# Patient Record
Sex: MALE | Race: WHITE | NOT HISPANIC OR LATINO | Employment: OTHER | URBAN - METROPOLITAN AREA
[De-identification: names, ages, dates, MRNs, and addresses within clinical notes are randomized per-mention and may not be internally consistent; named-entity substitution may affect disease eponyms.]

---

## 2017-01-01 PROBLEM — R07.9 CHEST PAIN: Status: ACTIVE | Noted: 2017-01-01

## 2017-01-01 PROBLEM — I44.2 CHB (COMPLETE HEART BLOCK): Status: ACTIVE | Noted: 2017-01-01

## 2018-01-26 PROBLEM — Z95.0 PACEMAKER: Status: ACTIVE | Noted: 2018-01-26

## 2019-07-26 PROBLEM — M25.569 CHRONIC KNEE PAIN: Status: ACTIVE | Noted: 2019-07-26

## 2019-07-26 PROBLEM — G89.29 CHRONIC BACK PAIN: Status: ACTIVE | Noted: 2019-07-26

## 2019-07-26 PROBLEM — G89.29 CHRONIC KNEE PAIN: Status: ACTIVE | Noted: 2019-07-26

## 2019-07-26 PROBLEM — M54.9 CHRONIC BACK PAIN: Status: ACTIVE | Noted: 2019-07-26

## 2019-08-19 PROBLEM — F40.240 CLAUSTROPHOBIA: Status: ACTIVE | Noted: 2019-08-19

## 2020-02-03 PROBLEM — I70.0 AORTIC ATHEROSCLEROSIS: Status: ACTIVE | Noted: 2020-02-03

## 2020-05-07 ENCOUNTER — CLINICAL SUPPORT (OUTPATIENT)
Dept: URGENT CARE | Facility: CLINIC | Age: 75
End: 2020-05-07
Payer: MEDICARE

## 2020-05-07 DIAGNOSIS — Z01.818 PREOPERATIVE TESTING: ICD-10-CM

## 2020-05-07 PROCEDURE — U0002 COVID-19 LAB TEST NON-CDC: HCPCS

## 2020-05-08 LAB — SARS-COV-2 RNA RESP QL NAA+PROBE: NOT DETECTED

## 2020-05-12 PROBLEM — I48.0 PAF (PAROXYSMAL ATRIAL FIBRILLATION): Status: ACTIVE | Noted: 2020-05-12

## 2021-01-09 ENCOUNTER — IMMUNIZATION (OUTPATIENT)
Dept: FAMILY MEDICINE | Facility: CLINIC | Age: 76
End: 2021-01-09
Payer: MEDICARE

## 2021-01-09 DIAGNOSIS — Z23 NEED FOR VACCINATION: ICD-10-CM

## 2021-01-09 PROCEDURE — 91300 COVID-19, MRNA, LNP-S, PF, 30 MCG/0.3 ML DOSE VACCINE: CPT | Mod: PBBFAC | Performed by: INTERNAL MEDICINE

## 2021-01-30 ENCOUNTER — IMMUNIZATION (OUTPATIENT)
Dept: FAMILY MEDICINE | Facility: CLINIC | Age: 76
End: 2021-01-30
Payer: MEDICARE

## 2021-01-30 DIAGNOSIS — Z23 NEED FOR VACCINATION: Primary | ICD-10-CM

## 2021-01-30 PROCEDURE — 91300 COVID-19, MRNA, LNP-S, PF, 30 MCG/0.3 ML DOSE VACCINE: CPT | Mod: PBBFAC | Performed by: INTERNAL MEDICINE

## 2021-01-30 PROCEDURE — 0002A COVID-19, MRNA, LNP-S, PF, 30 MCG/0.3 ML DOSE VACCINE: CPT | Mod: PBBFAC | Performed by: INTERNAL MEDICINE

## 2021-02-09 PROBLEM — R73.03 PREDIABETES: Status: ACTIVE | Noted: 2021-02-09

## 2021-02-09 PROBLEM — Z79.01 CHRONIC ANTICOAGULATION: Status: ACTIVE | Noted: 2021-02-09

## 2021-02-09 PROBLEM — H91.90 HEARING LOSS: Status: ACTIVE | Noted: 2021-02-09

## 2021-10-19 ENCOUNTER — TELEPHONE (OUTPATIENT)
Dept: PAIN MEDICINE | Facility: CLINIC | Age: 76
End: 2021-10-19

## 2021-10-21 ENCOUNTER — TELEPHONE (OUTPATIENT)
Dept: PAIN MEDICINE | Facility: CLINIC | Age: 76
End: 2021-10-21

## 2021-11-15 ENCOUNTER — OFFICE VISIT (OUTPATIENT)
Dept: PAIN MEDICINE | Facility: CLINIC | Age: 76
End: 2021-11-15
Payer: MEDICARE

## 2021-11-15 VITALS
HEART RATE: 64 BPM | WEIGHT: 276.81 LBS | SYSTOLIC BLOOD PRESSURE: 163 MMHG | DIASTOLIC BLOOD PRESSURE: 84 MMHG | BODY MASS INDEX: 40.87 KG/M2

## 2021-11-15 DIAGNOSIS — M25.561 CHRONIC PAIN OF RIGHT KNEE: Primary | ICD-10-CM

## 2021-11-15 DIAGNOSIS — G89.29 CHRONIC PAIN OF RIGHT KNEE: Primary | ICD-10-CM

## 2021-11-15 DIAGNOSIS — M54.41 CHRONIC BILATERAL LOW BACK PAIN WITH RIGHT-SIDED SCIATICA: ICD-10-CM

## 2021-11-15 DIAGNOSIS — G89.29 CHRONIC BILATERAL LOW BACK PAIN WITH RIGHT-SIDED SCIATICA: ICD-10-CM

## 2021-11-15 PROCEDURE — 1125F PR PAIN SEVERITY QUANTIFIED, PAIN PRESENT: ICD-10-PCS | Mod: CPTII,S$GLB,, | Performed by: ANESTHESIOLOGY

## 2021-11-15 PROCEDURE — 1160F RVW MEDS BY RX/DR IN RCRD: CPT | Mod: CPTII,S$GLB,, | Performed by: ANESTHESIOLOGY

## 2021-11-15 PROCEDURE — 3077F PR MOST RECENT SYSTOLIC BLOOD PRESSURE >= 140 MM HG: ICD-10-PCS | Mod: CPTII,S$GLB,, | Performed by: ANESTHESIOLOGY

## 2021-11-15 PROCEDURE — 3079F PR MOST RECENT DIASTOLIC BLOOD PRESSURE 80-89 MM HG: ICD-10-PCS | Mod: CPTII,S$GLB,, | Performed by: ANESTHESIOLOGY

## 2021-11-15 PROCEDURE — 1125F AMNT PAIN NOTED PAIN PRSNT: CPT | Mod: CPTII,S$GLB,, | Performed by: ANESTHESIOLOGY

## 2021-11-15 PROCEDURE — 99204 OFFICE O/P NEW MOD 45 MIN: CPT | Mod: S$GLB,,, | Performed by: ANESTHESIOLOGY

## 2021-11-15 PROCEDURE — 4010F PR ACE/ARB THEARPY RXD/TAKEN: ICD-10-PCS | Mod: CPTII,S$GLB,, | Performed by: ANESTHESIOLOGY

## 2021-11-15 PROCEDURE — 3008F BODY MASS INDEX DOCD: CPT | Mod: CPTII,S$GLB,, | Performed by: ANESTHESIOLOGY

## 2021-11-15 PROCEDURE — 3079F DIAST BP 80-89 MM HG: CPT | Mod: CPTII,S$GLB,, | Performed by: ANESTHESIOLOGY

## 2021-11-15 PROCEDURE — 1160F PR REVIEW ALL MEDS BY PRESCRIBER/CLIN PHARMACIST DOCUMENTED: ICD-10-PCS | Mod: CPTII,S$GLB,, | Performed by: ANESTHESIOLOGY

## 2021-11-15 PROCEDURE — 4010F ACE/ARB THERAPY RXD/TAKEN: CPT | Mod: CPTII,S$GLB,, | Performed by: ANESTHESIOLOGY

## 2021-11-15 PROCEDURE — 3288F PR FALLS RISK ASSESSMENT DOCUMENTED: ICD-10-PCS | Mod: CPTII,S$GLB,, | Performed by: ANESTHESIOLOGY

## 2021-11-15 PROCEDURE — 3008F PR BODY MASS INDEX (BMI) DOCUMENTED: ICD-10-PCS | Mod: CPTII,S$GLB,, | Performed by: ANESTHESIOLOGY

## 2021-11-15 PROCEDURE — 1101F PT FALLS ASSESS-DOCD LE1/YR: CPT | Mod: CPTII,S$GLB,, | Performed by: ANESTHESIOLOGY

## 2021-11-15 PROCEDURE — 1159F PR MEDICATION LIST DOCUMENTED IN MEDICAL RECORD: ICD-10-PCS | Mod: CPTII,S$GLB,, | Performed by: ANESTHESIOLOGY

## 2021-11-15 PROCEDURE — 3288F FALL RISK ASSESSMENT DOCD: CPT | Mod: CPTII,S$GLB,, | Performed by: ANESTHESIOLOGY

## 2021-11-15 PROCEDURE — 99204 PR OFFICE/OUTPT VISIT, NEW, LEVL IV, 45-59 MIN: ICD-10-PCS | Mod: S$GLB,,, | Performed by: ANESTHESIOLOGY

## 2021-11-15 PROCEDURE — 99999 PR PBB SHADOW E&M-EST. PATIENT-LVL III: CPT | Mod: PBBFAC,,, | Performed by: ANESTHESIOLOGY

## 2021-11-15 PROCEDURE — 1101F PR PT FALLS ASSESS DOC 0-1 FALLS W/OUT INJ PAST YR: ICD-10-PCS | Mod: CPTII,S$GLB,, | Performed by: ANESTHESIOLOGY

## 2021-11-15 PROCEDURE — 3044F HG A1C LEVEL LT 7.0%: CPT | Mod: CPTII,S$GLB,, | Performed by: ANESTHESIOLOGY

## 2021-11-15 PROCEDURE — 99999 PR PBB SHADOW E&M-EST. PATIENT-LVL III: ICD-10-PCS | Mod: PBBFAC,,, | Performed by: ANESTHESIOLOGY

## 2021-11-15 PROCEDURE — 3077F SYST BP >= 140 MM HG: CPT | Mod: CPTII,S$GLB,, | Performed by: ANESTHESIOLOGY

## 2021-11-15 PROCEDURE — 1159F MED LIST DOCD IN RCRD: CPT | Mod: CPTII,S$GLB,, | Performed by: ANESTHESIOLOGY

## 2021-11-15 PROCEDURE — 3044F PR MOST RECENT HEMOGLOBIN A1C LEVEL <7.0%: ICD-10-PCS | Mod: CPTII,S$GLB,, | Performed by: ANESTHESIOLOGY

## 2021-11-18 ENCOUNTER — HOSPITAL ENCOUNTER (OUTPATIENT)
Dept: RADIOLOGY | Facility: HOSPITAL | Age: 76
Discharge: HOME | End: 2021-11-18
Attending: ANESTHESIOLOGY
Payer: MEDICARE

## 2021-11-18 DIAGNOSIS — G89.29 CHRONIC BILATERAL LOW BACK PAIN WITH RIGHT-SIDED SCIATICA: ICD-10-CM

## 2021-11-18 DIAGNOSIS — G89.29 CHRONIC PAIN OF RIGHT KNEE: ICD-10-CM

## 2021-11-18 DIAGNOSIS — M54.41 CHRONIC BILATERAL LOW BACK PAIN WITH RIGHT-SIDED SCIATICA: ICD-10-CM

## 2021-11-18 DIAGNOSIS — M25.561 CHRONIC PAIN OF RIGHT KNEE: ICD-10-CM

## 2021-11-18 PROCEDURE — 72110 X-RAY EXAM L-2 SPINE 4/>VWS: CPT | Mod: 26,,, | Performed by: RADIOLOGY

## 2021-11-18 PROCEDURE — 73562 XR KNEE ORTHO RIGHT: ICD-10-PCS | Mod: 26,RT,, | Performed by: RADIOLOGY

## 2021-11-18 PROCEDURE — 73560 X-RAY EXAM OF KNEE 1 OR 2: CPT | Mod: TC,FY,PO,LT

## 2021-11-18 PROCEDURE — 72110 XR LUMBAR SPINE 5 VIEW WITH FLEX AND EXT: ICD-10-PCS | Mod: 26,,, | Performed by: RADIOLOGY

## 2021-11-18 PROCEDURE — 72110 X-RAY EXAM L-2 SPINE 4/>VWS: CPT | Mod: TC,FY,PO

## 2021-11-18 PROCEDURE — 73562 X-RAY EXAM OF KNEE 3: CPT | Mod: 26,RT,, | Performed by: RADIOLOGY

## 2021-11-18 PROCEDURE — 73560 X-RAY EXAM OF KNEE 1 OR 2: CPT | Mod: 26,LT,, | Performed by: RADIOLOGY

## 2021-11-18 PROCEDURE — 73560 XR KNEE ORTHO RIGHT: ICD-10-PCS | Mod: 26,LT,, | Performed by: RADIOLOGY

## 2021-11-22 ENCOUNTER — TELEPHONE (OUTPATIENT)
Dept: PAIN MEDICINE | Facility: CLINIC | Age: 76
End: 2021-11-22
Payer: MEDICARE

## 2021-11-22 RX ORDER — HYDROCODONE BITARTRATE AND ACETAMINOPHEN 10; 325 MG/1; MG/1
1 TABLET ORAL EVERY 12 HOURS PRN
Qty: 60 TABLET | Refills: 0 | Status: SHIPPED | OUTPATIENT
Start: 2021-11-22 | End: 2021-12-21 | Stop reason: SDUPTHER

## 2021-11-24 ENCOUNTER — OFFICE VISIT (OUTPATIENT)
Dept: PHYSICAL MEDICINE AND REHAB | Facility: CLINIC | Age: 76
End: 2021-11-24
Payer: MEDICARE

## 2021-11-24 VITALS — HEIGHT: 69 IN | WEIGHT: 276.69 LBS | BODY MASS INDEX: 40.98 KG/M2

## 2021-11-24 DIAGNOSIS — M25.561 CHRONIC PAIN OF RIGHT KNEE: ICD-10-CM

## 2021-11-24 DIAGNOSIS — M17.11 PRIMARY OSTEOARTHRITIS OF RIGHT KNEE: Primary | ICD-10-CM

## 2021-11-24 DIAGNOSIS — G89.29 CHRONIC PAIN OF RIGHT KNEE: ICD-10-CM

## 2021-11-24 PROCEDURE — 99204 OFFICE O/P NEW MOD 45 MIN: CPT | Mod: 25,S$GLB,, | Performed by: PHYSICAL MEDICINE & REHABILITATION

## 2021-11-24 PROCEDURE — 99499 RISK ADDL DX/OHS AUDIT: ICD-10-PCS | Mod: S$GLB,,, | Performed by: PHYSICAL MEDICINE & REHABILITATION

## 2021-11-24 PROCEDURE — 20611 DRAIN/INJ JOINT/BURSA W/US: CPT | Mod: RT,S$GLB,, | Performed by: PHYSICAL MEDICINE & REHABILITATION

## 2021-11-24 PROCEDURE — 99999 PR PBB SHADOW E&M-EST. PATIENT-LVL III: ICD-10-PCS | Mod: PBBFAC,,, | Performed by: PHYSICAL MEDICINE & REHABILITATION

## 2021-11-24 PROCEDURE — 4010F ACE/ARB THERAPY RXD/TAKEN: CPT | Mod: CPTII,S$GLB,, | Performed by: PHYSICAL MEDICINE & REHABILITATION

## 2021-11-24 PROCEDURE — 4010F PR ACE/ARB THEARPY RXD/TAKEN: ICD-10-PCS | Mod: CPTII,S$GLB,, | Performed by: PHYSICAL MEDICINE & REHABILITATION

## 2021-11-24 PROCEDURE — 99204 PR OFFICE/OUTPT VISIT, NEW, LEVL IV, 45-59 MIN: ICD-10-PCS | Mod: 25,S$GLB,, | Performed by: PHYSICAL MEDICINE & REHABILITATION

## 2021-11-24 PROCEDURE — 99999 PR PBB SHADOW E&M-EST. PATIENT-LVL III: CPT | Mod: PBBFAC,,, | Performed by: PHYSICAL MEDICINE & REHABILITATION

## 2021-11-24 PROCEDURE — 99499 UNLISTED E&M SERVICE: CPT | Mod: S$GLB,,, | Performed by: PHYSICAL MEDICINE & REHABILITATION

## 2021-11-24 PROCEDURE — 20611 LARGE JOINT ASPIRATION/INJECTION: R KNEE: ICD-10-PCS | Mod: RT,S$GLB,, | Performed by: PHYSICAL MEDICINE & REHABILITATION

## 2021-11-24 RX ORDER — TRIAMCINOLONE ACETONIDE 40 MG/ML
40 INJECTION, SUSPENSION INTRA-ARTICULAR; INTRAMUSCULAR
Status: DISCONTINUED | OUTPATIENT
Start: 2021-11-24 | End: 2021-11-25 | Stop reason: HOSPADM

## 2021-11-24 RX ADMIN — TRIAMCINOLONE ACETONIDE 40 MG: 40 INJECTION, SUSPENSION INTRA-ARTICULAR; INTRAMUSCULAR at 02:11

## 2021-11-29 ENCOUNTER — OFFICE VISIT (OUTPATIENT)
Dept: OPTOMETRY | Facility: CLINIC | Age: 76
End: 2021-11-29
Payer: MEDICARE

## 2021-11-29 DIAGNOSIS — H25.13 NUCLEAR SCLEROSIS, BILATERAL: Primary | ICD-10-CM

## 2021-11-29 DIAGNOSIS — H43.811 POSTERIOR VITREOUS DETACHMENT, RIGHT: ICD-10-CM

## 2021-11-29 DIAGNOSIS — H02.831 DERMATOCHALASIS OF BOTH UPPER EYELIDS: ICD-10-CM

## 2021-11-29 DIAGNOSIS — H02.834 DERMATOCHALASIS OF BOTH UPPER EYELIDS: ICD-10-CM

## 2021-11-29 DIAGNOSIS — H52.203 HYPEROPIA WITH ASTIGMATISM AND PRESBYOPIA, BILATERAL: ICD-10-CM

## 2021-11-29 DIAGNOSIS — H52.03 HYPEROPIA WITH ASTIGMATISM AND PRESBYOPIA, BILATERAL: ICD-10-CM

## 2021-11-29 DIAGNOSIS — H52.4 HYPEROPIA WITH ASTIGMATISM AND PRESBYOPIA, BILATERAL: ICD-10-CM

## 2021-11-29 DIAGNOSIS — Z13.5 GLAUCOMA SCREENING: ICD-10-CM

## 2021-11-29 PROCEDURE — 99999 PR PBB SHADOW E&M-EST. PATIENT-LVL III: ICD-10-PCS | Mod: PBBFAC,,, | Performed by: OPTOMETRIST

## 2021-11-29 PROCEDURE — 92004 PR EYE EXAM, NEW PATIENT,COMPREHESV: ICD-10-PCS | Mod: S$GLB,,, | Performed by: OPTOMETRIST

## 2021-11-29 PROCEDURE — 99999 PR PBB SHADOW E&M-EST. PATIENT-LVL III: CPT | Mod: PBBFAC,,, | Performed by: OPTOMETRIST

## 2021-11-29 PROCEDURE — 4010F PR ACE/ARB THEARPY RXD/TAKEN: ICD-10-PCS | Mod: CPTII,S$GLB,, | Performed by: OPTOMETRIST

## 2021-11-29 PROCEDURE — 4010F ACE/ARB THERAPY RXD/TAKEN: CPT | Mod: CPTII,S$GLB,, | Performed by: OPTOMETRIST

## 2021-11-29 PROCEDURE — 92004 COMPRE OPH EXAM NEW PT 1/>: CPT | Mod: S$GLB,,, | Performed by: OPTOMETRIST

## 2021-11-29 PROCEDURE — 92015 PR REFRACTION: ICD-10-PCS | Mod: S$GLB,,, | Performed by: OPTOMETRIST

## 2021-11-29 PROCEDURE — 92015 DETERMINE REFRACTIVE STATE: CPT | Mod: S$GLB,,, | Performed by: OPTOMETRIST

## 2021-12-13 ENCOUNTER — TELEPHONE (OUTPATIENT)
Dept: PAIN MEDICINE | Facility: CLINIC | Age: 76
End: 2021-12-13
Payer: MEDICARE

## 2021-12-21 ENCOUNTER — OFFICE VISIT (OUTPATIENT)
Dept: PAIN MEDICINE | Facility: CLINIC | Age: 76
End: 2021-12-21
Payer: MEDICARE

## 2021-12-21 VITALS
BODY MASS INDEX: 40.74 KG/M2 | SYSTOLIC BLOOD PRESSURE: 137 MMHG | HEART RATE: 60 BPM | WEIGHT: 275.88 LBS | RESPIRATION RATE: 20 BRPM | OXYGEN SATURATION: 95 % | TEMPERATURE: 98 F | DIASTOLIC BLOOD PRESSURE: 65 MMHG

## 2021-12-21 DIAGNOSIS — M47.816 LUMBAR SPONDYLOSIS: ICD-10-CM

## 2021-12-21 DIAGNOSIS — G89.29 CHRONIC BILATERAL LOW BACK PAIN WITHOUT SCIATICA: Primary | ICD-10-CM

## 2021-12-21 DIAGNOSIS — Z02.89 PAIN MANAGEMENT CONTRACT SIGNED: ICD-10-CM

## 2021-12-21 DIAGNOSIS — M54.50 CHRONIC BILATERAL LOW BACK PAIN WITHOUT SCIATICA: Primary | ICD-10-CM

## 2021-12-21 PROCEDURE — 4010F ACE/ARB THERAPY RXD/TAKEN: CPT | Mod: CPTII,S$GLB,, | Performed by: ANESTHESIOLOGY

## 2021-12-21 PROCEDURE — 99214 PR OFFICE/OUTPT VISIT, EST, LEVL IV, 30-39 MIN: ICD-10-PCS | Mod: S$GLB,,, | Performed by: ANESTHESIOLOGY

## 2021-12-21 PROCEDURE — 99214 OFFICE O/P EST MOD 30 MIN: CPT | Mod: S$GLB,,, | Performed by: ANESTHESIOLOGY

## 2021-12-21 PROCEDURE — 99999 PR PBB SHADOW E&M-EST. PATIENT-LVL IV: CPT | Mod: PBBFAC,,, | Performed by: ANESTHESIOLOGY

## 2021-12-21 PROCEDURE — 80307 DRUG TEST PRSMV CHEM ANLYZR: CPT | Performed by: ANESTHESIOLOGY

## 2021-12-21 PROCEDURE — 99999 PR PBB SHADOW E&M-EST. PATIENT-LVL IV: ICD-10-PCS | Mod: PBBFAC,,, | Performed by: ANESTHESIOLOGY

## 2021-12-21 PROCEDURE — 4010F PR ACE/ARB THEARPY RXD/TAKEN: ICD-10-PCS | Mod: CPTII,S$GLB,, | Performed by: ANESTHESIOLOGY

## 2021-12-21 RX ORDER — HYDROCODONE BITARTRATE AND ACETAMINOPHEN 10; 325 MG/1; MG/1
1 TABLET ORAL EVERY 12 HOURS PRN
Qty: 60 TABLET | Refills: 0 | Status: SHIPPED | OUTPATIENT
Start: 2021-12-21 | End: 2022-01-21 | Stop reason: SDUPTHER

## 2021-12-27 LAB
6MAM UR QL: NOT DETECTED
7AMINOCLONAZEPAM UR QL: NOT DETECTED
A-OH ALPRAZ UR QL: NOT DETECTED
ALPHA-OH-MIDAZOLAM: NOT DETECTED
ALPRAZ UR QL: NOT DETECTED
AMPHET UR QL SCN: NOT DETECTED
ANNOTATION COMMENT IMP: NORMAL
ANNOTATION COMMENT IMP: NORMAL
BARBITURATES UR QL: NOT DETECTED
BUPRENORPHINE UR QL: NOT DETECTED
BZE UR QL: NOT DETECTED
CARBOXYTHC UR QL: NOT DETECTED
CARISOPRODOL UR QL: NOT DETECTED
CLONAZEPAM UR QL: NOT DETECTED
CODEINE UR QL: NOT DETECTED
CREAT UR-MCNC: 162.8 MG/DL (ref 20–400)
DIAZEPAM UR QL: NOT DETECTED
ETHYL GLUCURONIDE UR QL: PRESENT
FENTANYL UR QL: NOT DETECTED
GABAPENTIN: NOT DETECTED
HYDROCODONE UR QL: NOT DETECTED
HYDROMORPHONE UR QL: NOT DETECTED
LORAZEPAM UR QL: NOT DETECTED
MDA UR QL: NOT DETECTED
MDEA UR QL: NOT DETECTED
MDMA UR QL: NOT DETECTED
ME-PHENIDATE UR QL: NOT DETECTED
METHADONE UR QL: NOT DETECTED
METHAMPHET UR QL: NOT DETECTED
MIDAZOLAM UR QL SCN: NOT DETECTED
MORPHINE UR QL: NOT DETECTED
NALOXONE: NOT DETECTED
NORBUPRENORPHINE UR QL CFM: NOT DETECTED
NORDIAZEPAM UR QL: NOT DETECTED
NORFENTANYL UR QL: NOT DETECTED
NORHYDROCODONE UR QL CFM: NOT DETECTED
NORMEPERIDINE UR QL CFM: NOT DETECTED
NOROXYCODONE UR QL CFM: PRESENT
NOROXYMORPHONE UR QL SCN: NOT DETECTED
OXAZEPAM UR QL: NOT DETECTED
OXYCODONE UR QL: PRESENT
OXYMORPHONE UR QL: PRESENT
PATHOLOGY STUDY: NORMAL
PCP UR QL: NOT DETECTED
PHENTERMINE UR QL: NOT DETECTED
PREGABALIN: NOT DETECTED
SERVICE CMNT-IMP: NORMAL
TAPENTADOL UR QL SCN: NOT DETECTED
TAPENTADOL UR QL SCN: NOT DETECTED
TEMAZEPAM UR QL: NOT DETECTED
TRAMADOL UR QL: NOT DETECTED
ZOLPIDEM METABOLITE: NOT DETECTED
ZOLPIDEM UR QL: NOT DETECTED

## 2022-01-21 RX ORDER — HYDROCODONE BITARTRATE AND ACETAMINOPHEN 10; 325 MG/1; MG/1
1 TABLET ORAL EVERY 12 HOURS PRN
Qty: 60 TABLET | Refills: 0 | Status: SHIPPED | OUTPATIENT
Start: 2022-01-21 | End: 2022-02-21 | Stop reason: SDUPTHER

## 2022-01-21 NOTE — TELEPHONE ENCOUNTER
----- Message from Phuong Gee sent at 1/21/2022  9:14 AM CST -----  Type:  RX Refill Request  Who Called: Patient  Refill or New Rx: refill  RX Name and Strength: HYDROcodone-acetaminophen (NORCO)  mg per tablet 60 tablet   How is the patient currently taking it? (ex. 1XDay):    Is this a 30 day or 90 day RX:    Preferred Pharmacy with phone number:  [id number] - [place] [facility], [address] [address] [facility]   Phone:  [phone]  Fax:  [phone]  Local or Mail Order:  Local  Ordering Provider:  Micheal Marquis MD  Best Call Back Number: [phone]   Additional Information: Pt requesting refill on Rx HYDROcodone-acetaminophen (NORCO)  mg per tablet 60 tablet

## 2022-03-21 ENCOUNTER — OFFICE VISIT (OUTPATIENT)
Dept: PAIN MEDICINE | Facility: CLINIC | Age: 77
End: 2022-03-21
Payer: MEDICARE

## 2022-03-21 VITALS
WEIGHT: 264.69 LBS | HEIGHT: 69 IN | HEART RATE: 61 BPM | SYSTOLIC BLOOD PRESSURE: 149 MMHG | OXYGEN SATURATION: 95 % | BODY MASS INDEX: 39.2 KG/M2 | DIASTOLIC BLOOD PRESSURE: 69 MMHG

## 2022-03-21 DIAGNOSIS — M54.50 CHRONIC BILATERAL LOW BACK PAIN WITHOUT SCIATICA: ICD-10-CM

## 2022-03-21 DIAGNOSIS — G89.29 CHRONIC BILATERAL LOW BACK PAIN WITHOUT SCIATICA: ICD-10-CM

## 2022-03-21 DIAGNOSIS — G89.29 CHRONIC BILATERAL LOW BACK PAIN WITHOUT SCIATICA: Primary | ICD-10-CM

## 2022-03-21 DIAGNOSIS — M25.561 CHRONIC PAIN OF RIGHT KNEE: ICD-10-CM

## 2022-03-21 DIAGNOSIS — Z02.89 PAIN MANAGEMENT CONTRACT SIGNED: Primary | ICD-10-CM

## 2022-03-21 DIAGNOSIS — G89.29 CHRONIC PAIN OF RIGHT KNEE: ICD-10-CM

## 2022-03-21 DIAGNOSIS — Z02.89 PAIN MANAGEMENT CONTRACT SIGNED: ICD-10-CM

## 2022-03-21 DIAGNOSIS — M54.50 CHRONIC BILATERAL LOW BACK PAIN WITHOUT SCIATICA: Primary | ICD-10-CM

## 2022-03-21 PROCEDURE — 99214 OFFICE O/P EST MOD 30 MIN: CPT | Mod: S$GLB,,,

## 2022-03-21 PROCEDURE — 3077F PR MOST RECENT SYSTOLIC BLOOD PRESSURE >= 140 MM HG: ICD-10-PCS | Mod: CPTII,S$GLB,,

## 2022-03-21 PROCEDURE — 3288F FALL RISK ASSESSMENT DOCD: CPT | Mod: CPTII,S$GLB,,

## 2022-03-21 PROCEDURE — 99999 PR PBB SHADOW E&M-EST. PATIENT-LVL III: ICD-10-PCS | Mod: PBBFAC,,,

## 2022-03-21 PROCEDURE — 3077F SYST BP >= 140 MM HG: CPT | Mod: CPTII,S$GLB,,

## 2022-03-21 PROCEDURE — 1126F AMNT PAIN NOTED NONE PRSNT: CPT | Mod: CPTII,S$GLB,,

## 2022-03-21 PROCEDURE — 1101F PR PT FALLS ASSESS DOC 0-1 FALLS W/OUT INJ PAST YR: ICD-10-PCS | Mod: CPTII,S$GLB,,

## 2022-03-21 PROCEDURE — 3288F PR FALLS RISK ASSESSMENT DOCUMENTED: ICD-10-PCS | Mod: CPTII,S$GLB,,

## 2022-03-21 PROCEDURE — 1126F PR PAIN SEVERITY QUANTIFIED, NO PAIN PRESENT: ICD-10-PCS | Mod: CPTII,S$GLB,,

## 2022-03-21 PROCEDURE — 1159F PR MEDICATION LIST DOCUMENTED IN MEDICAL RECORD: ICD-10-PCS | Mod: CPTII,S$GLB,,

## 2022-03-21 PROCEDURE — 1159F MED LIST DOCD IN RCRD: CPT | Mod: CPTII,S$GLB,,

## 2022-03-21 PROCEDURE — 99214 PR OFFICE/OUTPT VISIT, EST, LEVL IV, 30-39 MIN: ICD-10-PCS | Mod: S$GLB,,,

## 2022-03-21 PROCEDURE — 1101F PT FALLS ASSESS-DOCD LE1/YR: CPT | Mod: CPTII,S$GLB,,

## 2022-03-21 PROCEDURE — 99999 PR PBB SHADOW E&M-EST. PATIENT-LVL III: CPT | Mod: PBBFAC,,,

## 2022-03-21 PROCEDURE — 3078F PR MOST RECENT DIASTOLIC BLOOD PRESSURE < 80 MM HG: ICD-10-PCS | Mod: CPTII,S$GLB,,

## 2022-03-21 PROCEDURE — 3078F DIAST BP <80 MM HG: CPT | Mod: CPTII,S$GLB,,

## 2022-03-21 RX ORDER — HYDROCODONE BITARTRATE AND ACETAMINOPHEN 10; 325 MG/1; MG/1
1 TABLET ORAL EVERY 12 HOURS PRN
Qty: 60 TABLET | Refills: 0 | Status: SHIPPED | OUTPATIENT
Start: 2022-03-23 | End: 2022-04-21 | Stop reason: SDUPTHER

## 2022-03-21 NOTE — PROGRESS NOTES
[facility] Follow Up Evaluation    Referred by: Dr. Lerner  Reason for referral: back and neck pain    CC:   Chief Complaint   Patient presents with    Follow-up      Last 3 PDI Scores 12/21/2021 11/15/2021   Pain Disability Index (PDI) 6 48       Interval HPI 3/21/2022: Jay Han returns to the clinic for follow up.  Today he continues report low back pain and right knee pain however it is at his baseline.  Occasionally he states he over does it and as worsening back pain but overall it is tolerable.  He continues to take hydrocodone 2 tablets p.r.n. per day without any adverse events or side effects.  Pain today is a 2/10, he denies any new numbness, weakness or new changes to his bowel bladder function.      Initial HPI:   Jay Han is a 77 y.o. male who complains of back and right knee pain.  He's had this pain chronically for many years.  Has been taking hydrocodone as prescribed by his PCP.  Takes care of his disabled daughter at home.  Today his pain is 9/10, constant, sharp, aching, burning.  No numbness or weakness.  Pain can go down the right leg at times.    History:    Current Outpatient Medications:     amiodarone (PACERONE) 200 MG Tab, TAKE 1 TABLET EVERY DAY, Disp: 90 tablet, Rfl: 1    amLODIPine (NORVASC) 10 MG tablet, TAKE 1 TABLET EVERY DAY, Disp: 90 tablet, Rfl: 3    apixaban (ELIQUIS) 5 mg Tab, Take 1 tablet (5 mg total) by mouth 2 (two) times daily., Disp: 180 tablet, Rfl: 1    aspirin (ECOTRIN) 81 MG EC tablet, TAKE 1 TABLET EVERY DAY, Disp: 90 tablet, Rfl: 1    enalapril (VASOTEC) 20 MG tablet, TAKE 2 TABLETS EVERY DAY, Disp: 180 tablet, Rfl: 3    hydroCHLOROthiazide (HYDRODIURIL) 25 MG tablet, Take 1 tablet (25 mg total) by mouth once daily., Disp: 90 tablet, Rfl: 1    HYDROcodone-acetaminophen (NORCO)  mg per tablet, Take 1 tablet by mouth every 12 (twelve) hours as needed for Pain., Disp: 60 tablet, Rfl: 0    metoprolol tartrate (LOPRESSOR) 25 MG tablet,  "TAKE 1/2 TABLET TWICE DAILY, Disp: 90 tablet, Rfl: 1    potassium chloride SA (K-DUR,KLOR-CON) 20 MEQ tablet, TAKE 1 TABLET THREE TIMES WEEKLY (Patient taking differently: Take 10 mEq by mouth every other day.), Disp: 39 tablet, Rfl: 11    pravastatin (PRAVACHOL) 40 MG tablet, Take 1 tablet (40 mg total) by mouth once daily., Disp: 90 tablet, Rfl: 1    Past Medical History:   Diagnosis Date    Arthritis     Coronary artery disease     Dyslipidemia 1/14/2016    ED (erectile dysfunction)     Essential hypertension 1/14/2016    Essential hypertension, benign     H/O asbestos exposure     Hearing aid worn     History of chicken pox     History of wheezing     Hx of sarcoidosis     Hyperglycemia     Knee pain, chronic     right    Lipoprotein deficiencies     Lumbago     Lumbar herniated disc     Nonsustained ventricular tachycardia 1/14/2016    Other and unspecified hyperlipidemia     Other malaise and fatigue     Presence of stent in LAD coronary artery 2/11/2016    Presence of stent in left circumflex coronary artery 3/10/2016    Pure hyperglyceridemia     S/P coronary artery stent placement 3/10/2016    LAD, LCX    Shoulder pain     left shoulder    Ventricular premature complexes 1/14/2016       Past Surgical History:   Procedure Laterality Date    CARDIAC PACEMAKER PLACEMENT      CHOLECYSTECTOMY      COLONOSCOPY  2014    "a few polyps" per pt , approx date     CORONARY STENT PLACEMENT  1/2016    INSERTION OF PACEMAKER  2018    LUNG BIOPSY      benign       Family History   Problem Relation Age of Onset    Hypertension Mother     Hypertension Maternal Grandmother     Hypertension Maternal Grandfather     Kidney disease Father     Heart disease Maternal Uncle         heart attack, smoker    Glaucoma Neg Hx        Social History     Socioeconomic History    Marital status: Single   Tobacco Use    Smoking status: Never Smoker    Smokeless tobacco: Never Used   Substance and " "Sexual Activity    Alcohol use: Yes     Alcohol/week: 1.0 standard drink     Types: 1 Cans of beer per week     Comment: "very moderate"     Drug use: No    Sexual activity: Never     Social Determinants of Health     Financial Resource Strain: Medium Risk    Difficulty of Paying Living Expenses: Somewhat hard   Food Insecurity: No Food Insecurity    Worried About Running Out of Food in the Last Year: Never true    Ran Out of Food in the Last Year: Never true   Transportation Needs: No Transportation Needs    Lack of Transportation (Medical): No    Lack of Transportation (Non-Medical): No   Physical Activity: Inactive    Days of Exercise per Week: 0 days    Minutes of Exercise per Session: 0 min   Stress: Stress Concern Present    Feeling of Stress : Rather much   Social Connections: Unknown    Frequency of Communication with Friends and Family: More than three times a week    Frequency of Social Gatherings with Friends and Family: More than three times a week    Active Member of Clubs or Organizations: No    Attends Club or Organization Meetings: Never    Marital Status:    Housing Stability: Low Risk     Unable to Pay for Housing in the Last Year: No    Number of Places Lived in the Last Year: 1    Unstable Housing in the Last Year: No       Review of patient's allergies indicates:  No Known Allergies    Review of Systems:  General ROS: negative for - fever  Psychological ROS: negative for - hostility  Hematological and Lymphatic ROS: negative for - bleeding problems  Endocrine ROS: negative for - unexpected weight changes  Respiratory ROS: no cough, shortness of breath, or wheezing  Cardiovascular ROS: no chest pain or dyspnea on exertion  Gastrointestinal ROS: no abdominal pain, change in bowel habits, or black or bloody stools  Musculoskeletal ROS: negative for - muscular weakness  Neurological ROS: negative for - numbness/tingling  Dermatological ROS: negative for rash    Physical " "Exam:  Vitals:    03/21/22 0858   BP: (!) 149/69   Pulse: 61   SpO2: 95%   Weight: 120.1 kg (264 lb 10.6 oz)   Height: 5' 9" (1.753 m)   PainSc: 0-No pain     Body mass index is 39.08 kg/m².     Gen: NAD  Gait: gait intact  Psych:  Mood appropriate for given condition  HEENT: eyes anicteric   GI: Abd soft  CV: RRR  Lungs: breathing unlabored   ROM: limited AROM of the L spine in all planes, full ROM at ankles, knees and hips  Lumbar flexion 90 degrees, extension 50 degrees, side bending 30 degrees.    Sensation: intact to light touch in all dermatomes tested from L2-S1 bilaterally  Reflexes: 0/0 b/l patella and achilles  Palpation: Diffusely tender over lumbar paraspinals  -TTP over the b/l greater trochanters and bilateral SI joint  Tone: normal in the b/l knees and hips   Skin: intact  Extremities: No edema in b/l ankles or hands  Provacative tests: + b/l axial facet loading       Right Left   L2/3 Iliacus Hip flexion  5  5   L3/4 Qudratus Femoris Knee Extension  5  5   L4/5 Tib Anterior Ankle Dorsiflexion   5  5   L5/S1 Extensor Hallicus Longus Great toe extension  5  5                 S1/S2 Gastroc/Soleus Plantar Flexion  5  5       Imaging:  Xray lumbar spine 11/18/21  FINDINGS:  There is 4 mm anterolisthesis L2 on L3 which persists with flexion and extension position.  There is levoscoliosis centered at L3.  No pars defects.  No displaced fracture with preserved vertebral body heights.  Bulky partially bridging osteophytes at several lower thoracic levels and several lower lumbar levels most notably about L5.  Degenerative disc disease mild at L2-3 and moderate at L3-4 through L5-S1.  Facet degenerative change lowest 4 lumbar levels.  Cholecystectomy clips.  Atherosclerosis.  Partially imaged transvenous pacing wire.    Xray right knee 11/18/21  FINDINGS:  Marked medial joint space narrowing is noted bilaterally.  There is moderate tricompartmental osteophyte formation.  There is a joint effusion on the right. "  I do not see evidence of acute fracture.    Labs:  BMP  Lab Results   Component Value Date     02/17/2021    K 4.1 02/17/2021     02/17/2021    CO2 29 02/17/2021    BUN 27 (H) 02/17/2021    CREATININE 0.91 02/17/2021    CALCIUM 9.6 02/17/2021    ANIONGAP 8 05/09/2019    ESTGFRAFRICA 97 02/17/2021    EGFRNONAA 83 02/17/2021     Lab Results   Component Value Date    ALT 13 02/06/2020    AST 14 02/06/2020    ALKPHOS 54 02/06/2020    BILITOT 0.7 02/06/2020       Assessment:   Problem List Items Addressed This Visit        Orthopedic    Chronic knee pain    Chronic back pain - Primary      Other Visit Diagnoses     Pain management contract signed              77 y.o. year old male with PMH CAD s/p stents, a-fib, MORRIS, HTN who complains of back and right knee pain.  He's had this pain chronically for many years.  Has been taking hydrocodone as prescribed by his PCP.  Takes care of his disabled daughter at home.  Today his pain is 9/10, constant, sharp, aching, burning.  No numbness or weakness.  Pain can go down the right leg at times.    3/21/2022: Jay Han returns to the clinic for follow up.  Today he continues report low back pain and right knee pain however it is at his baseline.  Occasionally he states he over does it and as worsening back pain but overall it is tolerable.  He continues to take hydrocodone 2 tablets p.r.n. per day without any adverse events or side effects.  Pain today is a 2/10, he denies any new numbness, weakness or new changes to his bowel bladder function.    - on exam he has full strength  + b/l axial facet loading  - he reports he had good relief from previous injection from PMR, but feels like it is wearing off.  Today we discussed a genicular block for his right knee, he is not ready at this time and would like to think about it.  We also discussed trying diagnostic medial branch blocks for his low back pain however at this time he states his pain is tolerable  - xray  lumbar spine c/w facet degenerative change lowest 4 lumbar levels  - continue hydrocodone 10/325mg po bid prn for severe pain.  No signs of abuse, no adverse events noted, patient experiences significant benefit of analgesia, and patient demonstrates increased activity while on these medications.  The patient is meeting the goals of opioid therapy and is dependent on them for functionality and can not perform ADLS without them. Regarding opioids, the risks were discussed including tolerance, addiction, overdose, over-sedation, drug interactions, respiratory depression, opioid-induced hyperalgesia, and even death.    - refill for hydrocodone  mg #60 tabs sent to Dr. Marquis today  - follow-up in 3 months or sooner if needed.      : Reviewed and consistent with medication use as prescribed.    The above note was completed, in part, with the aid of Dragon dictation software/hardware. Translation errors may be present.

## 2022-04-21 ENCOUNTER — PATIENT MESSAGE (OUTPATIENT)
Dept: PAIN MEDICINE | Facility: CLINIC | Age: 77
End: 2022-04-21
Payer: MEDICARE

## 2022-04-21 DIAGNOSIS — G89.29 CHRONIC BILATERAL LOW BACK PAIN WITHOUT SCIATICA: ICD-10-CM

## 2022-04-21 DIAGNOSIS — Z02.89 PAIN MANAGEMENT CONTRACT SIGNED: ICD-10-CM

## 2022-04-21 DIAGNOSIS — M54.50 CHRONIC BILATERAL LOW BACK PAIN WITHOUT SCIATICA: ICD-10-CM

## 2022-04-21 RX ORDER — HYDROCODONE BITARTRATE AND ACETAMINOPHEN 10; 325 MG/1; MG/1
1 TABLET ORAL EVERY 12 HOURS PRN
Qty: 60 TABLET | Refills: 0 | Status: SHIPPED | OUTPATIENT
Start: 2022-04-21 | End: 2022-05-20 | Stop reason: SDUPTHER

## 2022-04-21 NOTE — TELEPHONE ENCOUNTER
----- Message from Candida Cazares sent at 4/21/2022  2:31 PM CDT -----  Regarding: refill  Contact: Patient/[phone] (home)  Type:  RX Refill Request    Who Called:  Patient/[phone] (home)     Refill or New Rx:  refill  RX Name and Strength:  HYDROcodone-acetaminophen (NORCO)  mg per tablet      How is the patient currently taking it? (ex. 1XDay):  as needed  Is this a 30 day or 90 day RX:  60 pills  Preferred Pharmacy with phone number:      [id number] - [facility], [address] [facility], [address] [facility] [address]  Phone: [phone] Fax: [phone]       Local or Mail Order:  local  Ordering Provider:  same

## 2022-04-21 NOTE — TELEPHONE ENCOUNTER
HYDROcodone-acetaminophen (NORCO)  mg per tablet   Take 1 tablet by mouth every 12 (twelve) hours as needed for Pain.   Dispense: 60 tablet     Refills: 0      Approved for refills by Dr. Marquis and sent to [facility].

## 2022-05-20 ENCOUNTER — TELEPHONE (OUTPATIENT)
Dept: PAIN MEDICINE | Facility: CLINIC | Age: 77
End: 2022-05-20
Payer: MEDICARE

## 2022-05-20 DIAGNOSIS — Z02.89 PAIN MANAGEMENT CONTRACT SIGNED: ICD-10-CM

## 2022-05-20 DIAGNOSIS — G89.29 CHRONIC BILATERAL LOW BACK PAIN WITHOUT SCIATICA: ICD-10-CM

## 2022-05-20 DIAGNOSIS — M54.50 CHRONIC BILATERAL LOW BACK PAIN WITHOUT SCIATICA: ICD-10-CM

## 2022-05-20 RX ORDER — HYDROCODONE BITARTRATE AND ACETAMINOPHEN 10; 325 MG/1; MG/1
1 TABLET ORAL EVERY 12 HOURS PRN
Qty: 60 TABLET | Refills: 0 | Status: SHIPPED | OUTPATIENT
Start: 2022-05-20 | End: 2022-06-21 | Stop reason: SDUPTHER

## 2022-05-20 NOTE — TELEPHONE ENCOUNTER
----- Message from Carlene Luna sent at 5/20/2022  1:29 PM CDT -----  Type:  RX Refill Request    Who Called:  PT  Refill or New Rx:  Refill  RX Name and Strength:  HYDROcodone-acetaminophen (NORCO)  mg per tablet  How is the patient currently taking it? (ex. 1XDay):    Is this a 30 day or 90 day RX:  60  Preferred Pharmacy with phone number:  [id number] - [facility], [address] [place] [address];  Local or Mail Order:  Local  Ordering Provider:  Dr. Benitez Stafford Call Back Number:  [phone]  Additional Information:  Please call and advise

## 2022-05-20 NOTE — TELEPHONE ENCOUNTER
Call placed to Pt to inform that refill request for HYDROcodone-acetaminophen (NORCO)  mg per tablet has been approved for refills by  and sent to their pharmacy of choice. No answer. V/M left.

## 2022-05-30 ENCOUNTER — TELEPHONE (OUTPATIENT)
Dept: FAMILY MEDICINE | Facility: CLINIC | Age: 77
End: 2022-05-30
Payer: MEDICARE

## 2022-06-02 ENCOUNTER — OFFICE VISIT (OUTPATIENT)
Dept: SPINE | Facility: CLINIC | Age: 77
End: 2022-06-02
Payer: MEDICARE

## 2022-06-02 VITALS
HEIGHT: 69 IN | WEIGHT: 266.56 LBS | DIASTOLIC BLOOD PRESSURE: 78 MMHG | BODY MASS INDEX: 39.48 KG/M2 | SYSTOLIC BLOOD PRESSURE: 127 MMHG | HEART RATE: 60 BPM

## 2022-06-02 DIAGNOSIS — M54.41 CHRONIC RIGHT-SIDED LOW BACK PAIN WITH RIGHT-SIDED SCIATICA: ICD-10-CM

## 2022-06-02 DIAGNOSIS — G89.29 CHRONIC LEFT-SIDED LOW BACK PAIN WITH LEFT-SIDED SCIATICA: ICD-10-CM

## 2022-06-02 DIAGNOSIS — G89.29 CHRONIC RIGHT-SIDED LOW BACK PAIN WITH RIGHT-SIDED SCIATICA: ICD-10-CM

## 2022-06-02 DIAGNOSIS — M54.42 CHRONIC LEFT-SIDED LOW BACK PAIN WITH LEFT-SIDED SCIATICA: ICD-10-CM

## 2022-06-02 PROCEDURE — 1159F PR MEDICATION LIST DOCUMENTED IN MEDICAL RECORD: ICD-10-PCS | Mod: CPTII,S$GLB,, | Performed by: PHYSICIAN ASSISTANT

## 2022-06-02 PROCEDURE — 3074F PR MOST RECENT SYSTOLIC BLOOD PRESSURE < 130 MM HG: ICD-10-PCS | Mod: CPTII,S$GLB,, | Performed by: PHYSICIAN ASSISTANT

## 2022-06-02 PROCEDURE — 1159F MED LIST DOCD IN RCRD: CPT | Mod: CPTII,S$GLB,, | Performed by: PHYSICIAN ASSISTANT

## 2022-06-02 PROCEDURE — 4010F ACE/ARB THERAPY RXD/TAKEN: CPT | Mod: CPTII,S$GLB,, | Performed by: PHYSICIAN ASSISTANT

## 2022-06-02 PROCEDURE — 99999 PR PBB SHADOW E&M-EST. PATIENT-LVL IV: ICD-10-PCS | Mod: PBBFAC,,, | Performed by: PHYSICIAN ASSISTANT

## 2022-06-02 PROCEDURE — 3078F DIAST BP <80 MM HG: CPT | Mod: CPTII,S$GLB,, | Performed by: PHYSICIAN ASSISTANT

## 2022-06-02 PROCEDURE — 1100F PTFALLS ASSESS-DOCD GE2>/YR: CPT | Mod: CPTII,S$GLB,, | Performed by: PHYSICIAN ASSISTANT

## 2022-06-02 PROCEDURE — 4010F PR ACE/ARB THEARPY RXD/TAKEN: ICD-10-PCS | Mod: CPTII,S$GLB,, | Performed by: PHYSICIAN ASSISTANT

## 2022-06-02 PROCEDURE — 1100F PR PT FALLS ASSESS DOC 2+ FALLS/FALL W/INJURY/YR: ICD-10-PCS | Mod: CPTII,S$GLB,, | Performed by: PHYSICIAN ASSISTANT

## 2022-06-02 PROCEDURE — 1125F PR PAIN SEVERITY QUANTIFIED, PAIN PRESENT: ICD-10-PCS | Mod: CPTII,S$GLB,, | Performed by: PHYSICIAN ASSISTANT

## 2022-06-02 PROCEDURE — 1160F RVW MEDS BY RX/DR IN RCRD: CPT | Mod: CPTII,S$GLB,, | Performed by: PHYSICIAN ASSISTANT

## 2022-06-02 PROCEDURE — 3288F PR FALLS RISK ASSESSMENT DOCUMENTED: ICD-10-PCS | Mod: CPTII,S$GLB,, | Performed by: PHYSICIAN ASSISTANT

## 2022-06-02 PROCEDURE — 99999 PR PBB SHADOW E&M-EST. PATIENT-LVL IV: CPT | Mod: PBBFAC,,, | Performed by: PHYSICIAN ASSISTANT

## 2022-06-02 PROCEDURE — 1125F AMNT PAIN NOTED PAIN PRSNT: CPT | Mod: CPTII,S$GLB,, | Performed by: PHYSICIAN ASSISTANT

## 2022-06-02 PROCEDURE — 3074F SYST BP LT 130 MM HG: CPT | Mod: CPTII,S$GLB,, | Performed by: PHYSICIAN ASSISTANT

## 2022-06-02 PROCEDURE — 3288F FALL RISK ASSESSMENT DOCD: CPT | Mod: CPTII,S$GLB,, | Performed by: PHYSICIAN ASSISTANT

## 2022-06-02 PROCEDURE — 99213 PR OFFICE/OUTPT VISIT, EST, LEVL III, 20-29 MIN: ICD-10-PCS | Mod: S$GLB,,, | Performed by: PHYSICIAN ASSISTANT

## 2022-06-02 PROCEDURE — 99213 OFFICE O/P EST LOW 20 MIN: CPT | Mod: S$GLB,,, | Performed by: PHYSICIAN ASSISTANT

## 2022-06-02 PROCEDURE — 3078F PR MOST RECENT DIASTOLIC BLOOD PRESSURE < 80 MM HG: ICD-10-PCS | Mod: CPTII,S$GLB,, | Performed by: PHYSICIAN ASSISTANT

## 2022-06-02 PROCEDURE — 1160F PR REVIEW ALL MEDS BY PRESCRIBER/CLIN PHARMACIST DOCUMENTED: ICD-10-PCS | Mod: CPTII,S$GLB,, | Performed by: PHYSICIAN ASSISTANT

## 2022-06-02 RX ORDER — METHOCARBAMOL 500 MG/1
500 TABLET, FILM COATED ORAL 4 TIMES DAILY
COMMUNITY
End: 2023-09-05

## 2022-06-08 NOTE — PROGRESS NOTES
Back and Spine Consult    Patient ID: Jay Han is a 77 y.o. male.    Chief Complaint   Patient presents with    Low-back Pain     Right-sided with radiation down the right leg for years, 1 month ago started with left hip burning pain when sitting. He has fallen twice in the last 2 months.       Review of Systems   Constitutional: Negative for activity change, chills, fatigue and unexpected weight change.   HENT: Negative for hearing loss, tinnitus, trouble swallowing and voice change.    Eyes: Negative for visual disturbance.   Respiratory: Negative for apnea, chest tightness and shortness of breath.    Cardiovascular: Negative for chest pain and palpitations.   Gastrointestinal: Negative for abdominal pain, constipation, diarrhea, nausea and vomiting.   Genitourinary: Negative for difficulty urinating, dysuria and frequency.   Musculoskeletal: Positive for back pain. Negative for gait problem, neck pain and neck stiffness.   Skin: Negative for wound.   Neurological: Negative for dizziness, tremors, seizures, facial asymmetry, speech difficulty, weakness, light-headedness, numbness and headaches.   Psychiatric/Behavioral: Negative for confusion and decreased concentration.       Past Medical History:   Diagnosis Date    Arthritis     Coronary artery disease     Dyslipidemia 1/14/2016    ED (erectile dysfunction)     Essential hypertension 1/14/2016    Essential hypertension, benign     H/O asbestos exposure     Hearing aid worn     History of chicken pox     History of wheezing     Hx of sarcoidosis     Hyperglycemia     Knee pain, chronic     right    Lipoprotein deficiencies     Lumbago     Lumbar herniated disc     Nonsustained ventricular tachycardia 1/14/2016    Other and unspecified hyperlipidemia     Other malaise and fatigue     Presence of stent in LAD coronary artery 2/11/2016    Presence of stent in left circumflex coronary artery 3/10/2016    Pure hyperglyceridemia     S/P  "coronary artery stent placement 3/10/2016    LAD, LCX    Shoulder pain     left shoulder    Ventricular premature complexes 1/14/2016     Social History     Socioeconomic History    Marital status: Single   Tobacco Use    Smoking status: Never Smoker    Smokeless tobacco: Never Used   Substance and Sexual Activity    Alcohol use: Yes     Alcohol/week: 1.0 standard drink     Types: 1 Cans of beer per week     Comment: "very moderate"     Drug use: No    Sexual activity: Not Currently     Social Determinants of Health     Financial Resource Strain: Unknown    Difficulty of Paying Living Expenses: Patient refused   Food Insecurity: Unknown    Worried About Running Out of Food in the Last Year: Patient refused    Ran Out of Food in the Last Year: Patient refused   Transportation Needs: Unknown    Lack of Transportation (Medical): Patient refused    Lack of Transportation (Non-Medical): Patient refused   Physical Activity: Inactive    Days of Exercise per Week: 0 days    Minutes of Exercise per Session: 0 min   Stress: Stress Concern Present    Feeling of Stress : To some extent   Social Connections: Unknown    Frequency of Communication with Friends and Family: More than three times a week    Frequency of Social Gatherings with Friends and Family: More than three times a week    Active Member of Clubs or Organizations: No    Attends Club or Organization Meetings: Never    Marital Status:    Housing Stability: Low Risk     Unable to Pay for Housing in the Last Year: No    Number of Places Lived in the Last Year: 1    Unstable Housing in the Last Year: No     Family History   Problem Relation Age of Onset    Hypertension Mother     Hypertension Maternal Grandmother     Hypertension Maternal Grandfather     Kidney disease Father     Heart disease Maternal Uncle         heart attack, smoker    Glaucoma Neg Hx      Review of patient's allergies indicates:  No Known Allergies    Current " "Outpatient Medications:     amiodarone (PACERONE) 200 MG Tab, TAKE 1 TABLET EVERY DAY, Disp: 90 tablet, Rfl: 1    amLODIPine (NORVASC) 10 MG tablet, TAKE 1 TABLET EVERY DAY, Disp: 90 tablet, Rfl: 3    apixaban (ELIQUIS) 5 mg Tab, Take 1 tablet (5 mg total) by mouth 2 (two) times daily., Disp: 180 tablet, Rfl: 1    aspirin (ECOTRIN) 81 MG EC tablet, TAKE 1 TABLET EVERY DAY, Disp: 90 tablet, Rfl: 1    enalapril (VASOTEC) 20 MG tablet, TAKE 2 TABLETS EVERY DAY, Disp: 180 tablet, Rfl: 3    hydroCHLOROthiazide (HYDRODIURIL) 25 MG tablet, Take 1 tablet (25 mg total) by mouth once daily., Disp: 90 tablet, Rfl: 3    HYDROcodone-acetaminophen (NORCO)  mg per tablet, Take 1 tablet by mouth every 12 (twelve) hours as needed for Pain., Disp: 60 tablet, Rfl: 0    methocarbamoL (ROBAXIN) 500 MG Tab, Take 500 mg by mouth 4 (four) times daily., Disp: , Rfl:     metoprolol tartrate (LOPRESSOR) 25 MG tablet, TAKE 1/2 TABLET TWICE DAILY, Disp: 90 tablet, Rfl: 1    potassium chloride SA (K-DUR,KLOR-CON) 20 MEQ tablet, TAKE 1 TABLET THREE TIMES WEEKLY (Patient taking differently: Take 20 mEq by mouth. Pt reports he takes this maybe once every 3 months or so.), Disp: 39 tablet, Rfl: 11    pravastatin (PRAVACHOL) 40 MG tablet, TAKE 1 TABLET EVERY DAY, Disp: 90 tablet, Rfl: 1    Vitals:    06/02/22 0751   BP: 127/78   BP Location: Left arm   Patient Position: Sitting   BP Method: Large (Automatic)   Pulse: 60   Weight: 120.9 kg (266 lb 8.6 oz)   Height: 5' 9" (1.753 m)       Physical Exam  Vitals and nursing note reviewed.   Constitutional:       Appearance: He is well-developed.   HENT:      Head: Normocephalic and atraumatic.   Eyes:      Pupils: Pupils are equal, round, and reactive to light.   Cardiovascular:      Rate and Rhythm: Normal rate.   Pulmonary:      Effort: Pulmonary effort is normal.   Abdominal:      General: There is no distension.   Musculoskeletal:         General: Normal range of motion.      Cervical " back: Normal range of motion and neck supple.   Skin:     General: Skin is warm and dry.   Neurological:      Mental Status: He is alert and oriented to person, place, and time.      Coordination: Finger-Nose-Finger Test, Heel to Shin Test and Romberg Test normal.      Gait: Gait is intact. Tandem walk normal.      Deep Tendon Reflexes:      Reflex Scores:       Tricep reflexes are 2+ on the right side and 2+ on the left side.       Bicep reflexes are 2+ on the right side and 2+ on the left side.       Brachioradialis reflexes are 2+ on the right side and 2+ on the left side.       Patellar reflexes are 2+ on the right side and 2+ on the left side.       Achilles reflexes are 2+ on the right side and 2+ on the left side.  Psychiatric:         Speech: Speech normal.         Behavior: Behavior normal.         Thought Content: Thought content normal.         Judgment: Judgment normal.         Neurologic Exam     Mental Status   Oriented to person, place, and time.   Oriented to person.   Oriented to place.   Oriented to time.   Follows 3 step commands.   Attention: normal. Concentration: normal.   Speech: speech is normal   Level of consciousness: alert  Knowledge: consistent with education.   Able to name object. Able to read. Able to repeat. Able to write. Normal comprehension.     Cranial Nerves     CN II   Visual acuity: normal  Right visual field deficit: none  Left visual field deficit: none     CN III, IV, VI   Pupils are equal, round, and reactive to light.  Right pupil: Size: 3 mm. Shape: regular. Reactivity: brisk. Consensual response: intact.   Left pupil: Size: 3 mm. Shape: regular. Reactivity: brisk. Consensual response: intact.   CN III: no CN III palsy  CN VI: no CN VI palsy  Nystagmus: none   Diplopia: none  Ophthalmoparesis: none  Conjugate gaze: present    CN V   Right facial sensation deficit: none  Left facial sensation deficit: none    CN VII   Right facial weakness: none  Left facial weakness:  none    CN VIII   Hearing: intact    CN IX, X   CN IX normal.   CN X normal.     CN XI   Right sternocleidomastoid strength: normal  Left sternocleidomastoid strength: normal  Right trapezius strength: normal  Left trapezius strength: normal    CN XII   Fasciculations: absent  Tongue deviation: none    Motor Exam   Muscle bulk: normal  Overall muscle tone: normal  Right arm pronator drift: absent  Left arm pronator drift: absent    Strength   Right neck flexion: 5/5  Left neck flexion: 5/5  Right neck extension: 5/5  Left neck extension: 5/5  Right deltoid: 5/5  Left deltoid: 5/5  Right biceps: 5/5  Left biceps: 5/5  Right triceps: 5/5  Left triceps: 5/5  Right wrist flexion: 5/5  Left wrist flexion: 5/5  Right wrist extension: 5/5  Left wrist extension: 5/5  Right interossei: 5/5  Left interossei: 5/5  Right abdominals: 5/5  Left abdominals: 5/5  Right iliopsoas: 5/5  Left iliopsoas: 5/5  Right quadriceps: 5/5  Left quadriceps: 5/5  Right hamstrin/5  Left hamstrin/5  Right glutei: 5/5  Left glutei: 5/5  Right anterior tibial: 5/5  Left anterior tibial: 5/5  Right posterior tibial: 5/5  Left posterior tibial: 5/5  Right peroneal: 5/5  Left peroneal: 5/5  Right gastroc: 5/5  Left gastroc: 5/5    Sensory Exam   Right arm light touch: normal  Left arm light touch: normal  Right leg light touch: normal  Left leg light touch: normal  Right arm vibration: normal  Left arm vibration: normal  Right arm pinprick: normal  Left arm pinprick: normal    Gait, Coordination, and Reflexes     Gait  Gait: normal    Coordination   Romberg: negative  Finger to nose coordination: normal  Heel to shin coordination: normal  Tandem walking coordination: normal    Tremor   Resting tremor: absent  Intention tremor: absent  Action tremor: absent    Reflexes   Right brachioradialis: 2+  Left brachioradialis: 2+  Right biceps: 2+  Left biceps: 2+  Right triceps: 2+  Left triceps: 2+  Right patellar: 2+  Left patellar: 2+  Right  achilles: 2+  Left achilles: 2+  Right Humphrey: absent  Left Humphrey: absent  Right ankle clonus: absent  Left ankle clonus: absent      Provider dictation:    77 year old male with CAD, hypertension, history of knee pain for which he uses a cane for, and who has atrial fibrillation (pacemaker and eliquis) presents to discuss back pain.  He has chronic back pain in the left lower lumbar region if he sits for extended periods of time.  If he stands too long, pain radiates across the entire lower lumbar region.  He denies any radicular leg pain, numbness or tingling.  He has previously been seen by pain management who has managed pain with hydrocodone and discussed medial branch block with him.  He has falledn 2 times in the last 2 months due to dizziness.    Current medications:  Hydrocodone; prescribed robaxin, but has not started.  Medications tried:  No others  Physical therapy:  none  Interventional Procedures:  none     On exam, there is 5/5 strength with 2+ DTR and no sensory deficits.  Gait and station fluid.  Denies bowel/ bladder dysfunction.  Full range of motion of the upper and lower extremities. No pain with axial facet loading.  No SI joint pain on palpation.  No pain with lumbar flexion/ extension.    Xray lumbar spine from 5-27-20 reviewed.  There is rotary levoscolios in the lumbar spine with a 12 degree curve.  Grade 1 retrolisthesis of L3 on L4 with multi level disk space narrowing and facet arthropathy.    Mr. Thompson has chronic lower lumbar back pain without radiculopathy nor neurological deficits.  Pain pattern consistent with myofascial and arthritic back pain.  He can continue with hyrodocone sparingly as needed.  I recommend he consider PT and proceeding with MBB as discussed with pain management.  He has an appointment 6-21-22 with pain management and will discuss further with them at that time.  No further treatment at this time.      Visit Diagnosis:  Chronic left-sided low back pain with  left-sided sciatica  -     Ambulatory referral/consult to [facility] Sp[facility] Clinic    Chronic right-sided low back pain with right-sided sciatica  -     Ambulatory referral/consult to Back & Spine Clinic        Total time spent counseling greater than fifty percent of total visit time.  Counseling included discussion regarding imaging findings, diagnosis possibilities, treatment options, risks and benefits.   The patient had many questions regarding the options and long-term effects.

## 2022-06-21 ENCOUNTER — OFFICE VISIT (OUTPATIENT)
Dept: PAIN MEDICINE | Facility: CLINIC | Age: 77
End: 2022-06-21
Payer: MEDICARE

## 2022-06-21 VITALS
SYSTOLIC BLOOD PRESSURE: 153 MMHG | HEIGHT: 69 IN | WEIGHT: 267.88 LBS | DIASTOLIC BLOOD PRESSURE: 95 MMHG | OXYGEN SATURATION: 97 % | HEART RATE: 60 BPM | BODY MASS INDEX: 39.68 KG/M2

## 2022-06-21 DIAGNOSIS — Z02.89 PAIN MANAGEMENT CONTRACT SIGNED: ICD-10-CM

## 2022-06-21 DIAGNOSIS — Z02.89 PAIN MANAGEMENT CONTRACT SIGNED: Primary | ICD-10-CM

## 2022-06-21 DIAGNOSIS — M25.561 CHRONIC PAIN OF RIGHT KNEE: ICD-10-CM

## 2022-06-21 DIAGNOSIS — G89.29 CHRONIC PAIN OF RIGHT KNEE: ICD-10-CM

## 2022-06-21 DIAGNOSIS — G89.29 CHRONIC BILATERAL LOW BACK PAIN WITHOUT SCIATICA: ICD-10-CM

## 2022-06-21 DIAGNOSIS — M54.50 CHRONIC BILATERAL LOW BACK PAIN WITHOUT SCIATICA: ICD-10-CM

## 2022-06-21 PROCEDURE — 1159F MED LIST DOCD IN RCRD: CPT | Mod: CPTII,S$GLB,,

## 2022-06-21 PROCEDURE — 3077F SYST BP >= 140 MM HG: CPT | Mod: CPTII,S$GLB,,

## 2022-06-21 PROCEDURE — 4010F ACE/ARB THERAPY RXD/TAKEN: CPT | Mod: CPTII,S$GLB,,

## 2022-06-21 PROCEDURE — 99999 PR PBB SHADOW E&M-EST. PATIENT-LVL III: ICD-10-PCS | Mod: PBBFAC,,,

## 2022-06-21 PROCEDURE — 99214 OFFICE O/P EST MOD 30 MIN: CPT | Mod: S$GLB,,,

## 2022-06-21 PROCEDURE — 1159F PR MEDICATION LIST DOCUMENTED IN MEDICAL RECORD: ICD-10-PCS | Mod: CPTII,S$GLB,,

## 2022-06-21 PROCEDURE — 1125F PR PAIN SEVERITY QUANTIFIED, PAIN PRESENT: ICD-10-PCS | Mod: CPTII,S$GLB,,

## 2022-06-21 PROCEDURE — 3288F FALL RISK ASSESSMENT DOCD: CPT | Mod: CPTII,S$GLB,,

## 2022-06-21 PROCEDURE — 3080F PR MOST RECENT DIASTOLIC BLOOD PRESSURE >= 90 MM HG: ICD-10-PCS | Mod: CPTII,S$GLB,,

## 2022-06-21 PROCEDURE — 99999 PR PBB SHADOW E&M-EST. PATIENT-LVL III: CPT | Mod: PBBFAC,,,

## 2022-06-21 PROCEDURE — 1101F PR PT FALLS ASSESS DOC 0-1 FALLS W/OUT INJ PAST YR: ICD-10-PCS | Mod: CPTII,S$GLB,,

## 2022-06-21 PROCEDURE — 3288F PR FALLS RISK ASSESSMENT DOCUMENTED: ICD-10-PCS | Mod: CPTII,S$GLB,,

## 2022-06-21 PROCEDURE — 99214 PR OFFICE/OUTPT VISIT, EST, LEVL IV, 30-39 MIN: ICD-10-PCS | Mod: S$GLB,,,

## 2022-06-21 PROCEDURE — 3080F DIAST BP >= 90 MM HG: CPT | Mod: CPTII,S$GLB,,

## 2022-06-21 PROCEDURE — 1125F AMNT PAIN NOTED PAIN PRSNT: CPT | Mod: CPTII,S$GLB,,

## 2022-06-21 PROCEDURE — 4010F PR ACE/ARB THEARPY RXD/TAKEN: ICD-10-PCS | Mod: CPTII,S$GLB,,

## 2022-06-21 PROCEDURE — 3077F PR MOST RECENT SYSTOLIC BLOOD PRESSURE >= 140 MM HG: ICD-10-PCS | Mod: CPTII,S$GLB,,

## 2022-06-21 PROCEDURE — 1101F PT FALLS ASSESS-DOCD LE1/YR: CPT | Mod: CPTII,S$GLB,,

## 2022-06-21 RX ORDER — HYDROCODONE BITARTRATE AND ACETAMINOPHEN 10; 325 MG/1; MG/1
1 TABLET ORAL EVERY 12 HOURS PRN
Qty: 60 TABLET | Refills: 0 | Status: SHIPPED | OUTPATIENT
Start: 2022-06-21 | End: 2022-07-20 | Stop reason: SDUPTHER

## 2022-06-21 NOTE — PROGRESS NOTES
[facility] Follow Up Evaluation    Referred by: Dr. Lerner  Reason for referral: back and neck pain    CC:   Chief Complaint   Patient presents with    Follow-up      Last 3 PDI Scores 12/21/2021 11/15/2021   Pain Disability Index (PDI) 6 48       Interval HPI 6/21/2022: Jay Han returns to the clinic for follow up.  He is reporting continued lower back pain and worsening right knee pain.  He reports overall his pain is currently well controlled with his pain medication.  He has good days and bad days, but overall his pain is currently well controlled.  He denies any new radiating pain, weakness, numbness/tingling or changes to his bowel or bladder function. He has spoken with Dr. Barakat about a potential RFA for his right knee but at this time he is not ready to proceed. He continues to take hydrocodone 2 tablets p.r.n. per day without any adverse events or side effects.      Initial HPI:   Jay Han is a 77 y.o. male who complains of back and right knee pain.  He's had this pain chronically for many years.  Has been taking hydrocodone as prescribed by his PCP.  Takes care of his disabled daughter at home.  Today his pain is 9/10, constant, sharp, aching, burning.  No numbness or weakness.  Pain can go down the right leg at times.    History:    Current Outpatient Medications:     amiodarone (PACERONE) 200 MG Tab, TAKE 1 TABLET EVERY DAY, Disp: 90 tablet, Rfl: 1    amLODIPine (NORVASC) 10 MG tablet, TAKE 1 TABLET EVERY DAY, Disp: 90 tablet, Rfl: 3    aspirin (ECOTRIN) 81 MG EC tablet, TAKE 1 TABLET EVERY DAY, Disp: 90 tablet, Rfl: 1    ELIQUIS 5 mg Tab, TAKE 1 TABLET TWICE DAILY, Disp: 180 tablet, Rfl: 1    enalapril (VASOTEC) 20 MG tablet, TAKE 2 TABLETS EVERY DAY, Disp: 180 tablet, Rfl: 3    hydroCHLOROthiazide (HYDRODIURIL) 25 MG tablet, Take 1 tablet (25 mg total) by mouth once daily., Disp: 90 tablet, Rfl: 3    methocarbamoL (ROBAXIN) 500 MG Tab, Take 500 mg by mouth 4 (four)  "times daily. prn, Disp: , Rfl:     metoprolol tartrate (LOPRESSOR) 25 MG tablet, TAKE 1/2 TABLET TWICE DAILY, Disp: 90 tablet, Rfl: 1    potassium chloride SA (K-DUR,KLOR-CON) 20 MEQ tablet, TAKE 1 TABLET THREE TIMES WEEKLY (Patient taking differently: Take 20 mEq by mouth. Pt reports he takes this maybe once every 3 months or so.), Disp: 39 tablet, Rfl: 11    pravastatin (PRAVACHOL) 40 MG tablet, TAKE 1 TABLET EVERY DAY, Disp: 90 tablet, Rfl: 1    Past Medical History:   Diagnosis Date    Arthritis     Coronary artery disease     Dyslipidemia 1/14/2016    ED (erectile dysfunction)     Essential hypertension 1/14/2016    Essential hypertension, benign     H/O asbestos exposure     Hearing aid worn     History of chicken pox     History of wheezing     Hx of sarcoidosis     Hyperglycemia     Knee pain, chronic     right    Lipoprotein deficiencies     Lumbago     Lumbar herniated disc     Nonsustained ventricular tachycardia 1/14/2016    Other and unspecified hyperlipidemia     Other malaise and fatigue     Presence of stent in LAD coronary artery 2/11/2016    Presence of stent in left circumflex coronary artery 3/10/2016    Pure hyperglyceridemia     S/P coronary artery stent placement 3/10/2016    LAD, LCX    Shoulder pain     left shoulder    Ventricular premature complexes 1/14/2016       Past Surgical History:   Procedure Laterality Date    CARDIAC PACEMAKER PLACEMENT      CHOLECYSTECTOMY      COLONOSCOPY  2014    "a few polyps" per pt , approx date     CORONARY STENT PLACEMENT  1/2016    INSERTION OF PACEMAKER  2018    LUNG BIOPSY      benign       Family History   Problem Relation Age of Onset    Hypertension Mother     Hypertension Maternal Grandmother     Hypertension Maternal Grandfather     Kidney disease Father     Heart disease Maternal Uncle         heart attack, smoker    Glaucoma Neg Hx        Social History     Socioeconomic History    Marital status: Single " "  Tobacco Use    Smoking status: Never Smoker    Smokeless tobacco: Never Used   Substance and Sexual Activity    Alcohol use: Yes     Alcohol/week: 1.0 standard drink     Types: 1 Cans of beer per week     Comment: "very moderate"     Drug use: No    Sexual activity: Not Currently     Social Determinants of Health     Financial Resource Strain: Unknown    Difficulty of Paying Living Expenses: Patient refused   Food Insecurity: Unknown    Worried About Running Out of Food in the Last Year: Patient refused    Ran Out of Food in the Last Year: Patient refused   Transportation Needs: Unknown    Lack of Transportation (Medical): Patient refused    Lack of Transportation (Non-Medical): Patient refused   Physical Activity: Inactive    Days of Exercise per Week: 0 days    Minutes of Exercise per Session: 0 min   Stress: Stress Concern Present    Feeling of Stress : To some extent   Social Connections: Unknown    Frequency of Communication with Friends and Family: More than three times a week    Frequency of Social Gatherings with Friends and Family: More than three times a week    Active Member of Clubs or Organizations: No    Attends Club or Organization Meetings: Never    Marital Status:    Housing Stability: Low Risk     Unable to Pay for Housing in the Last Year: No    Number of Places Lived in the Last Year: 1    Unstable Housing in the Last Year: No       Review of patient's allergies indicates:  No Known Allergies    Review of Systems:  General ROS: negative for - fever  Psychological ROS: negative for - hostility  Hematological and Lymphatic ROS: negative for - bleeding problems  Endocrine ROS: negative for - unexpected weight changes  Respiratory ROS: no cough, shortness of breath, or wheezing  Cardiovascular ROS: no chest pain or dyspnea on exertion  Gastrointestinal ROS: no abdominal pain, change in bowel habits, or black or bloody stools  Musculoskeletal ROS: negative for - muscular " "weakness  Neurological ROS: negative for - numbness/tingling  Dermatological ROS: negative for rash    Physical Exam:  Vitals:    06/21/22 0816   BP: (!) 153/95   Pulse: 60   SpO2: 97%   Weight: 121.5 kg (267 lb 13.7 oz)   Height: 5' 9" (1.753 m)   PainSc:   6   PainLoc: Knee     Body mass index is 39.56 kg/m².     Gen: NAD  Gait: gait intact  Psych:  Mood appropriate for given condition  HEENT: eyes anicteric   GI: Abd soft  CV: RRR  Lungs: breathing unlabored   ROM: limited AROM of the L spine in all planes, full ROM at ankles, knees and hips  Lumbar flexion 90 degrees, extension 50 degrees, side bending 30 degrees.    Sensation: intact to light touch in all dermatomes tested from L2-S1 bilaterally  Reflexes: 0/0 b/l patella and achilles  Palpation: Diffusely tender over lumbar paraspinals  -TTP over the b/l greater trochanters and bilateral SI joint  Tone: normal in the b/l knees and hips   Skin: intact  Extremities: No edema in b/l ankles or hands  Provacative tests: + b/l axial facet loading       Right Left   L2/3 Iliacus Hip flexion  5  5   L3/4 Qudratus Femoris Knee Extension  5  5   L4/5 Tib Anterior Ankle Dorsiflexion   5  5   L5/S1 Extensor Hallicus Longus Great toe extension  5  5                 S1/S2 Gastroc/Soleus Plantar Flexion  5  5       Imaging:  Xray lumbar spine 11/18/21  FINDINGS:  There is 4 mm anterolisthesis L2 on L3 which persists with flexion and extension position.  There is levoscoliosis centered at L3.  No pars defects.  No displaced fracture with preserved vertebral body heights.  Bulky partially bridging osteophytes at several lower thoracic levels and several lower lumbar levels most notably about L5.  Degenerative disc disease mild at L2-3 and moderate at L3-4 through L5-S1.  Facet degenerative change lowest 4 lumbar levels.  Cholecystectomy clips.  Atherosclerosis.  Partially imaged transvenous pacing wire.    Xray right knee 11/18/21  FINDINGS:  Marked medial joint space " narrowing is noted bilaterally.  There is moderate tricompartmental osteophyte formation.  There is a joint effusion on the right.  I do not see evidence of acute fracture.    X-ray lumbar spine 5/27/2022  FINDINGS:  There is a 12 degree levocurvature of the lumbar spine with associated rotatory component.  There is mild grade 1 retrolisthesis of L3 on L4.  There is loss of normal lordosis.  Alignment is otherwise within normal limits.  There is loss of disc height at L4/5, L5/S1 and to a lesser degree at L3/4.  There is associated anterior spondylosis, posterior spondylosis, sclerotic discogenic endplate changes and marked facet arthropathy.  These findings are most pronounced at L5/S1.  There is no displaced fracture or dislocation.     Vertebral body heights are preserved.  Arterial calcifications are present.     Impression:     1. Advanced multilevel disc and joint disease.    Labs:  BMP  Lab Results   Component Value Date     05/04/2022    K 3.4 (L) 05/04/2022     05/04/2022    CO2 27 05/04/2022    BUN 17 05/04/2022    CREATININE 0.84 05/04/2022    CALCIUM 9.6 05/04/2022    ANIONGAP 7 (L) 05/04/2022    ESTGFRAFRICA >60 05/04/2022    EGFRNONAA >60 05/04/2022     Lab Results   Component Value Date    ALT 23 05/04/2022    AST 24 05/04/2022    ALKPHOS 86 05/04/2022    BILITOT 0.5 05/04/2022       Assessment:   Problem List Items Addressed This Visit        Orthopedic    Chronic knee pain    Chronic back pain      Other Visit Diagnoses     Pain management contract signed    -  Primary          77 y.o. year old male with PMH CAD s/p stents, a-fib, MORRIS, HTN who complains of back and right knee pain.  He's had this pain chronically for many years.  Has been taking hydrocodone as prescribed by his PCP.  Takes care of his disabled daughter at home.  Today his pain is 9/10, constant, sharp, aching, burning.  No numbness or weakness.  Pain can go down the right leg at times.    6/21/2022: Jay Han  returns to the clinic for follow up.  He is reporting continued lower back pain and worsening right knee pain.  He reports overall his pain is currently well controlled with his pain medication.  He has good days and bad days, but overall his pain is currently well controlled.  He denies any new radiating pain, weakness, numbness/tingling or changes to his bowel or bladder function. He has spoken with Dr. Barakat about a potential RFA for his right knee but at this time he is not ready to proceed. He continues to take hydrocodone 2 tablets p.r.n. per day without any adverse events or side effects.    - on exam he has full strength    - he is reporting worsening right knee pain and has spoken with Dr. Barakat about potential RFA however at this time he is not ready to proceed.  He is also considering a right knee replacement but again is not quite ready to proceed with any procedures yet as he still continues to take care of his disabled daughter at home.  - continue hydrocodone 10/325mg po bid prn for severe pain.  No signs of abuse, no adverse events noted, patient experiences significant benefit of analgesia, and patient demonstrates increased activity while on these medications.  The patient is meeting the goals of opioid therapy and is dependent on them for functionality and can not perform ADLS without them. Regarding opioids, the risks were discussed including tolerance, addiction, overdose, over-sedation, drug interactions, respiratory depression, opioid-induced hyperalgesia, and even death.    - refill for hydrocodone  mg #60 tabs sent to Dr. Nuñez today  - follow-up in 3 months or sooner if needed.      : Reviewed and consistent with medication use as prescribed.    The above note was completed, in part, with the aid of Dragon dictation software/hardware. Translation errors may be present.

## 2022-07-20 ENCOUNTER — PATIENT MESSAGE (OUTPATIENT)
Dept: PAIN MEDICINE | Facility: CLINIC | Age: 77
End: 2022-07-20
Payer: MEDICARE

## 2022-07-20 ENCOUNTER — TELEPHONE (OUTPATIENT)
Dept: PAIN MEDICINE | Facility: CLINIC | Age: 77
End: 2022-07-20
Payer: MEDICARE

## 2022-07-20 DIAGNOSIS — G89.29 CHRONIC BILATERAL LOW BACK PAIN WITHOUT SCIATICA: ICD-10-CM

## 2022-07-20 DIAGNOSIS — M54.50 CHRONIC BILATERAL LOW BACK PAIN WITHOUT SCIATICA: ICD-10-CM

## 2022-07-20 DIAGNOSIS — Z02.89 PAIN MANAGEMENT CONTRACT SIGNED: ICD-10-CM

## 2022-07-20 RX ORDER — HYDROCODONE BITARTRATE AND ACETAMINOPHEN 10; 325 MG/1; MG/1
1 TABLET ORAL EVERY 12 HOURS PRN
Qty: 60 TABLET | Refills: 0 | Status: SHIPPED | OUTPATIENT
Start: 2022-07-20 | End: 2022-08-18 | Stop reason: SDUPTHER

## 2022-07-20 NOTE — TELEPHONE ENCOUNTER
Refills have been requested for the following medications:      HYDROcodone-acetaminophen (NORCO)  mg per tablet. Request forwarded to Dr. Marquis for review.

## 2022-07-20 NOTE — TELEPHONE ENCOUNTER
----- Message from Doris Verdin sent at 7/20/2022  8:46 AM CDT -----  Contact: Self  Type:  RX Refill Request    Who Called:  Patient  Refill or New Rx:  New Rx  RX Name and Strength:  HYDROcodone-acetaminophen (NORCO)  mg per tablet  How is the patient currently taking it? (ex. 1XDay):  As directed  Is this a 30 day or 90 day RX: 30  Preferred Pharmacy with phone number:    [id number], [id number] [place] [address], [id number] [place] [id number]  Phone: [phone] Fax: [phone]  Local or Mail Order:  Local  Ordering Provider:  Dr Benitez Stafford Call Back Number:  [phone]  Additional Information:  Thank You

## 2022-07-20 NOTE — TELEPHONE ENCOUNTER
Refill request approved for HYDROcodone-acetaminophen (NORCO)  mg per tablet by  and sent to pharm to dispense.

## 2022-09-21 ENCOUNTER — OFFICE VISIT (OUTPATIENT)
Dept: PAIN MEDICINE | Facility: CLINIC | Age: 77
End: 2022-09-21
Payer: MEDICARE

## 2022-09-21 VITALS
BODY MASS INDEX: 40.81 KG/M2 | WEIGHT: 275.56 LBS | SYSTOLIC BLOOD PRESSURE: 146 MMHG | HEART RATE: 61 BPM | HEIGHT: 69 IN | DIASTOLIC BLOOD PRESSURE: 73 MMHG | OXYGEN SATURATION: 96 %

## 2022-09-21 DIAGNOSIS — M25.561 CHRONIC PAIN OF RIGHT KNEE: ICD-10-CM

## 2022-09-21 DIAGNOSIS — G89.29 CHRONIC PAIN OF RIGHT KNEE: ICD-10-CM

## 2022-09-21 DIAGNOSIS — M54.50 CHRONIC BILATERAL LOW BACK PAIN WITHOUT SCIATICA: ICD-10-CM

## 2022-09-21 DIAGNOSIS — G89.29 CHRONIC BILATERAL LOW BACK PAIN WITHOUT SCIATICA: ICD-10-CM

## 2022-09-21 DIAGNOSIS — Z02.89 PAIN MANAGEMENT CONTRACT SIGNED: ICD-10-CM

## 2022-09-21 DIAGNOSIS — M47.816 LUMBAR SPONDYLOSIS: Primary | ICD-10-CM

## 2022-09-21 PROCEDURE — 1159F PR MEDICATION LIST DOCUMENTED IN MEDICAL RECORD: ICD-10-PCS | Mod: CPTII,S$GLB,,

## 2022-09-21 PROCEDURE — 1101F PT FALLS ASSESS-DOCD LE1/YR: CPT | Mod: CPTII,S$GLB,,

## 2022-09-21 PROCEDURE — 99999 PR PBB SHADOW E&M-EST. PATIENT-LVL III: ICD-10-PCS | Mod: PBBFAC,,,

## 2022-09-21 PROCEDURE — 99214 PR OFFICE/OUTPT VISIT, EST, LEVL IV, 30-39 MIN: ICD-10-PCS | Mod: S$GLB,,,

## 2022-09-21 PROCEDURE — 4010F PR ACE/ARB THEARPY RXD/TAKEN: ICD-10-PCS | Mod: CPTII,S$GLB,,

## 2022-09-21 PROCEDURE — 4010F ACE/ARB THERAPY RXD/TAKEN: CPT | Mod: CPTII,S$GLB,,

## 2022-09-21 PROCEDURE — 3077F PR MOST RECENT SYSTOLIC BLOOD PRESSURE >= 140 MM HG: ICD-10-PCS | Mod: CPTII,S$GLB,,

## 2022-09-21 PROCEDURE — 3044F PR MOST RECENT HEMOGLOBIN A1C LEVEL <7.0%: ICD-10-PCS | Mod: CPTII,S$GLB,,

## 2022-09-21 PROCEDURE — 1125F PR PAIN SEVERITY QUANTIFIED, PAIN PRESENT: ICD-10-PCS | Mod: CPTII,S$GLB,,

## 2022-09-21 PROCEDURE — 99999 PR PBB SHADOW E&M-EST. PATIENT-LVL III: CPT | Mod: PBBFAC,,,

## 2022-09-21 PROCEDURE — 3078F PR MOST RECENT DIASTOLIC BLOOD PRESSURE < 80 MM HG: ICD-10-PCS | Mod: CPTII,S$GLB,,

## 2022-09-21 PROCEDURE — 1125F AMNT PAIN NOTED PAIN PRSNT: CPT | Mod: CPTII,S$GLB,,

## 2022-09-21 PROCEDURE — 3044F HG A1C LEVEL LT 7.0%: CPT | Mod: CPTII,S$GLB,,

## 2022-09-21 PROCEDURE — 3288F FALL RISK ASSESSMENT DOCD: CPT | Mod: CPTII,S$GLB,,

## 2022-09-21 PROCEDURE — 3078F DIAST BP <80 MM HG: CPT | Mod: CPTII,S$GLB,,

## 2022-09-21 PROCEDURE — 99214 OFFICE O/P EST MOD 30 MIN: CPT | Mod: S$GLB,,,

## 2022-09-21 PROCEDURE — 3077F SYST BP >= 140 MM HG: CPT | Mod: CPTII,S$GLB,,

## 2022-09-21 PROCEDURE — 1101F PR PT FALLS ASSESS DOC 0-1 FALLS W/OUT INJ PAST YR: ICD-10-PCS | Mod: CPTII,S$GLB,,

## 2022-09-21 PROCEDURE — 3288F PR FALLS RISK ASSESSMENT DOCUMENTED: ICD-10-PCS | Mod: CPTII,S$GLB,,

## 2022-09-21 PROCEDURE — 1159F MED LIST DOCD IN RCRD: CPT | Mod: CPTII,S$GLB,,

## 2022-09-21 NOTE — PROGRESS NOTES
[facility] Follow Up Evaluation    Referred by: Dr. Lerner  Reason for referral: back and neck pain    CC:   Chief Complaint   Patient presents with    Follow-up        Last 3 PDI Scores 12/21/2021 11/15/2021   Pain Disability Index (PDI) 6 48     Interval HPI 9/21/2022: Jay Han returns to the clinic for follow up.  Today he is reporting worsening lower back pain and continued right knee pain.  He states that his lower back pain feels like it has been worsening lately.  He reports trying to replace an electrical box and was having difficulty due to the pain is lower back and had to sit down frequently to alleviate the pain.  He does not feel like any pain radiates from his back into his legs.  He describes the pain is burning, sharp, dull, ache alleviated with rest.  He has not been able to follow-up with PM&R for a genicular ablation in his back pain is currently more bothersome than his knee pain.  He continues to take hydrocodone twice daily without any adverse events or side effects.      Initial HPI:   Jay Han is a 77 y.o. male who complains of back and right knee pain.  He's had this pain chronically for many years.  Has been taking hydrocodone as prescribed by his PCP.  Takes care of his disabled daughter at home.  Today his pain is 9/10, constant, sharp, aching, burning.  No numbness or weakness.  Pain can go down the right leg at times.    History:    Current Outpatient Medications:     amiodarone (PACERONE) 200 MG Tab, TAKE 1 TABLET EVERY DAY, Disp: 90 tablet, Rfl: 1    amLODIPine (NORVASC) 10 MG tablet, TAKE 1 TABLET EVERY DAY, Disp: 90 tablet, Rfl: 3    aspirin (ECOTRIN) 81 MG EC tablet, TAKE 1 TABLET EVERY DAY, Disp: 90 tablet, Rfl: 1    ELIQUIS 5 mg Tab, TAKE 1 TABLET TWICE DAILY, Disp: 180 tablet, Rfl: 1    enalapril (VASOTEC) 20 MG tablet, TAKE 2 TABLETS EVERY DAY, Disp: 180 tablet, Rfl: 3    hydroCHLOROthiazide (HYDRODIURIL) 25 MG tablet, Take 1 tablet (25 mg total) by mouth  once daily., Disp: 90 tablet, Rfl: 3    HYDROcodone-acetaminophen (NORCO)  mg per tablet, Take 1 tablet by mouth every 12 (twelve) hours as needed for Pain., Disp: 60 tablet, Rfl: 0    methocarbamoL (ROBAXIN) 500 MG Tab, Take 500 mg by mouth 4 (four) times daily. prn, Disp: , Rfl:     metoprolol tartrate (LOPRESSOR) 25 MG tablet, TAKE 1/2 TABLET TWICE DAILY, Disp: 90 tablet, Rfl: 1    potassium chloride SA (K-DUR,KLOR-CON) 20 MEQ tablet, TAKE 1 TABLET THREE TIMES WEEKLY (Patient taking differently: Take 20 mEq by mouth. Pt reports he takes this maybe once every 3 months or so.), Disp: 39 tablet, Rfl: 11    pravastatin (PRAVACHOL) 40 MG tablet, TAKE 1 TABLET EVERY DAY, Disp: 90 tablet, Rfl: 1    Past Medical History:   Diagnosis Date    Arthritis     Coronary artery disease     Dyslipidemia 1/14/2016    ED (erectile dysfunction)     Essential hypertension 1/14/2016    Essential hypertension, benign     H/O asbestos exposure     Hearing aid worn     History of chicken pox     History of wheezing     Hx of sarcoidosis     Hyperglycemia     Knee pain, chronic     right    Lipoprotein deficiencies     Lumbago     Lumbar herniated disc     Nonsustained ventricular tachycardia 1/14/2016    Other and unspecified hyperlipidemia     Other malaise and fatigue     Presence of stent in LAD coronary artery 2/11/2016    Presence of stent in left circumflex coronary artery 3/10/2016    Pure hyperglyceridemia     S/P coronary artery stent placement 3/10/2016    LAD, LCX    Shoulder pain     left shoulder    Ventricular premature complexes 1/14/2016       Past Surgical History:   Procedure Laterality Date    CARDIAC PACEMAKER PLACEMENT      CHOLECYSTECTOMY      COLONOSCOPY N/A 10/26/2021    Dr. Kaur    CORONARY STENT PLACEMENT  01/2016    INSERTION OF PACEMAKER  2018    LUNG BIOPSY      benign       Family History   Problem Relation Age of Onset    Hypertension Mother     Hypertension Maternal Grandmother     Hypertension  "Maternal Grandfather     Kidney disease Father     Heart disease Maternal Uncle         heart attack, smoker    Glaucoma Neg Hx        Social History     Socioeconomic History    Marital status: Single   Tobacco Use    Smoking status: Never    Smokeless tobacco: Never   Substance and Sexual Activity    Alcohol use: Yes     Alcohol/week: 1.0 standard drink     Types: 1 Cans of beer per week     Comment: "very moderate"     Drug use: No    Sexual activity: Not Currently     Social Determinants of Health     Financial Resource Strain: Unknown    Difficulty of Paying Living Expenses: Patient refused   Food Insecurity: Unknown    Worried About Running Out of Food in the Last Year: Patient refused    Ran Out of Food in the Last Year: Patient refused   Transportation Needs: Unknown    Lack of Transportation (Medical): Patient refused    Lack of Transportation (Non-Medical): Patient refused   Physical Activity: Inactive    Days of Exercise per Week: 0 days    Minutes of Exercise per Session: 0 min   Stress: Stress Concern Present    Feeling of Stress : To some extent   Social Connections: Unknown    Frequency of Communication with Friends and Family: More than three times a week    Frequency of Social Gatherings with Friends and Family: More than three times a week    Active Member of Clubs or Organizations: No    Attends Club or Organization Meetings: Never    Marital Status:    Housing Stability: Low Risk     Unable to Pay for Housing in the Last Year: No    Number of Places Lived in the Last Year: 1    Unstable Housing in the Last Year: No       Review of patient's allergies indicates:  No Known Allergies    Review of Systems:  General ROS: negative for - fever  Psychological ROS: negative for - hostility  Hematological and Lymphatic ROS: negative for - bleeding problems  Endocrine ROS: negative for - unexpected weight changes  Respiratory ROS: no cough, shortness of breath, or wheezing  Cardiovascular ROS: no " "chest pain or dyspnea on exertion  Gastrointestinal ROS: no abdominal pain, change in bowel habits, or black or bloody stools  Musculoskeletal ROS: negative for - muscular weakness  Neurological ROS: negative for - numbness/tingling  Dermatological ROS: negative for rash    Physical Exam:  Vitals:    09/21/22 0923   BP: (!) 146/73   Pulse: 61   SpO2: 96%   Weight: 125 kg (275 lb 9.2 oz)   Height: 5' 9" (1.753 m)   PainSc:   8   PainLoc: Back       Body mass index is 40.7 kg/m².     Gen: NAD  Gait: gait intact  Psych:  Mood appropriate for given condition  HEENT: eyes anicteric   GI: Abd soft  CV: RRR  Lungs: breathing unlabored   ROM: limited AROM of the L spine in all planes, full ROM at ankles, knees and hips  Lumbar flexion 90 degrees, extension 50 degrees, side bending 30 degrees.    Sensation: intact to light touch in all dermatomes tested from L2-S1 bilaterally  Reflexes: 0/0 b/l patella and achilles  Palpation: Diffusely tender over lumbar paraspinals  -TTP over the b/l greater trochanters and bilateral SI joint  Tone: normal in the b/l knees and hips   Skin: intact  Extremities: No edema in b/l ankles or hands  Provacative tests: + b/l axial facet loading       Right Left   L2/3 Iliacus Hip flexion  5  5   L3/4 Qudratus Femoris Knee Extension  5  5   L4/5 Tib Anterior Ankle Dorsiflexion   5  5   L5/S1 Extensor Hallicus Longus Great toe extension  5  5                 S1/S2 Gastroc/Soleus Plantar Flexion  5  5       Imaging:  Xray lumbar spine 11/18/21  FINDINGS:  There is 4 mm anterolisthesis L2 on L3 which persists with flexion and extension position.  There is levoscoliosis centered at L3.  No pars defects.  No displaced fracture with preserved vertebral body heights.  Bulky partially bridging osteophytes at several lower thoracic levels and several lower lumbar levels most notably about L5.  Degenerative disc disease mild at L2-3 and moderate at L3-4 through L5-S1.  Facet degenerative change lowest 4 " lumbar levels.  Cholecystectomy clips.  Atherosclerosis.  Partially imaged transvenous pacing wire.    Xray right knee 11/18/21  FINDINGS:  Marked medial joint space narrowing is noted bilaterally.  There is moderate tricompartmental osteophyte formation.  There is a joint effusion on the right.  I do not see evidence of acute fracture.    X-ray lumbar spine 5/27/2022  FINDINGS:  There is a 12 degree levocurvature of the lumbar spine with associated rotatory component.  There is mild grade 1 retrolisthesis of L3 on L4.  There is loss of normal lordosis.  Alignment is otherwise within normal limits.  There is loss of disc height at L4/5, L5/S1 and to a lesser degree at L3/4.  There is associated anterior spondylosis, posterior spondylosis, sclerotic discogenic endplate changes and marked facet arthropathy.  These findings are most pronounced at L5/S1.  There is no displaced fracture or dislocation.     Vertebral body heights are preserved.  Arterial calcifications are present.     Impression:     1. Advanced multilevel disc and joint disease.    Labs:  BMP  Lab Results   Component Value Date     07/28/2022    K 3.7 07/28/2022     07/28/2022    CO2 26 07/28/2022    BUN 23 (H) 07/28/2022    CREATININE 0.74 07/28/2022    CALCIUM 9.9 07/28/2022    ANIONGAP 9 07/28/2022    ESTGFRAFRICA >60 07/28/2022    EGFRNONAA >60 07/28/2022     Lab Results   Component Value Date    ALT 22 07/28/2022    AST 27 07/28/2022    ALKPHOS 78 07/28/2022    BILITOT 1.0 07/28/2022       Assessment:   Problem List Items Addressed This Visit          Orthopedic    Chronic knee pain    Chronic back pain     Other Visit Diagnoses       Lumbar spondylosis    -  Primary    Pain management contract signed                  77 y.o. year old male with PMH CAD s/p stents, a-fib, MORRIS, HTN who complains of back and right knee pain.  He's had this pain chronically for many years.  Has been taking hydrocodone as prescribed by his PCP.  Takes care  of his disabled daughter at home.  Today his pain is 9/10, constant, sharp, aching, burning.  No numbness or weakness.  Pain can go down the right leg at times.    9/21/2022: Jay Han returns to the clinic for follow up.  Today he is reporting worsening lower back pain and continued right knee pain.  He states that his lower back pain feels like it has been worsening lately.  He reports trying to replace an electrical box and was having difficulty due to the pain is lower back and had to sit down frequently to alleviate the pain.  He does not feel like any pain radiates from his back into his legs.  He describes the pain is burning, sharp, dull, ache alleviated with rest.  He has not been able to follow-up with PM&R for a genicular ablation in his back pain is currently more bothersome than his knee pain.  He continues to take hydrocodone twice daily without any adverse events or side effects.    - on exam he has full strength  he has reproducible pain with bilateral axial facet loading.  - I personally reviewed his lumbar x-ray is consistent with multilevel facet arthropathy worse at L4/5 and L5/S1.  - his pain is limiting his mobility interfering with his ADLs.  - continue hydrocodone 10/325mg po bid prn for severe pain.  No signs of abuse, no adverse events noted, patient experiences significant benefit of analgesia, and patient demonstrates increased activity while on these medications.  The patient is meeting the goals of opioid therapy and is dependent on them for functionality and can not perform ADLS without them. Regarding opioids, the risks were discussed including tolerance, addiction, overdose, over-sedation, drug interactions, respiratory depression, opioid-induced hyperalgesia, and even death.    - for his axial facet mediated pain I would like to schedule him for bilateral L4/5 and L5/S1 diagnostic medial branch block.  If successful we will repeat the blocks prior to proceeding with  radiofrequency ablation.  - he was just refilled for hydrocodone  mg #60 tablets on 09/19/2022.  - follow-up 4 weeks post procedure or sooner if needed.  - he has a busy schedule and is unsure when he is able do the ablation.  Three-month follow-up also scheduled for his normal interval.    : Reviewed and consistent with medication use as prescribed.    The above note was completed, in part, with the aid of Dragon dictation software/hardware. Translation errors may be present.

## 2022-12-21 ENCOUNTER — PATIENT MESSAGE (OUTPATIENT)
Dept: PAIN MEDICINE | Facility: CLINIC | Age: 77
End: 2022-12-21

## 2022-12-21 ENCOUNTER — OFFICE VISIT (OUTPATIENT)
Dept: PAIN MEDICINE | Facility: CLINIC | Age: 77
End: 2022-12-21
Payer: MEDICARE

## 2022-12-21 VITALS
HEART RATE: 62 BPM | SYSTOLIC BLOOD PRESSURE: 142 MMHG | WEIGHT: 272.81 LBS | HEIGHT: 69 IN | DIASTOLIC BLOOD PRESSURE: 103 MMHG | BODY MASS INDEX: 40.4 KG/M2

## 2022-12-21 DIAGNOSIS — Z02.89 PAIN MANAGEMENT CONTRACT SIGNED: ICD-10-CM

## 2022-12-21 DIAGNOSIS — G89.29 CHRONIC BILATERAL LOW BACK PAIN WITHOUT SCIATICA: ICD-10-CM

## 2022-12-21 DIAGNOSIS — M47.816 LUMBAR SPONDYLOSIS: Primary | ICD-10-CM

## 2022-12-21 DIAGNOSIS — G89.4 CHRONIC PAIN DISORDER: ICD-10-CM

## 2022-12-21 DIAGNOSIS — G89.29 CHRONIC PAIN OF RIGHT KNEE: ICD-10-CM

## 2022-12-21 DIAGNOSIS — M25.561 CHRONIC PAIN OF RIGHT KNEE: ICD-10-CM

## 2022-12-21 DIAGNOSIS — M54.50 CHRONIC BILATERAL LOW BACK PAIN WITHOUT SCIATICA: ICD-10-CM

## 2022-12-21 PROCEDURE — 99999 PR PBB SHADOW E&M-EST. PATIENT-LVL IV: ICD-10-PCS | Mod: PBBFAC,,,

## 2022-12-21 PROCEDURE — 3077F PR MOST RECENT SYSTOLIC BLOOD PRESSURE >= 140 MM HG: ICD-10-PCS | Mod: CPTII,S$GLB,,

## 2022-12-21 PROCEDURE — 3080F PR MOST RECENT DIASTOLIC BLOOD PRESSURE >= 90 MM HG: ICD-10-PCS | Mod: CPTII,S$GLB,,

## 2022-12-21 PROCEDURE — 3077F SYST BP >= 140 MM HG: CPT | Mod: CPTII,S$GLB,,

## 2022-12-21 PROCEDURE — 99214 OFFICE O/P EST MOD 30 MIN: CPT | Mod: S$GLB,,,

## 2022-12-21 PROCEDURE — 1159F MED LIST DOCD IN RCRD: CPT | Mod: CPTII,S$GLB,,

## 2022-12-21 PROCEDURE — 3044F HG A1C LEVEL LT 7.0%: CPT | Mod: CPTII,S$GLB,,

## 2022-12-21 PROCEDURE — 1125F PR PAIN SEVERITY QUANTIFIED, PAIN PRESENT: ICD-10-PCS | Mod: CPTII,S$GLB,,

## 2022-12-21 PROCEDURE — 3044F PR MOST RECENT HEMOGLOBIN A1C LEVEL <7.0%: ICD-10-PCS | Mod: CPTII,S$GLB,,

## 2022-12-21 PROCEDURE — 1101F PT FALLS ASSESS-DOCD LE1/YR: CPT | Mod: CPTII,S$GLB,,

## 2022-12-21 PROCEDURE — 1101F PR PT FALLS ASSESS DOC 0-1 FALLS W/OUT INJ PAST YR: ICD-10-PCS | Mod: CPTII,S$GLB,,

## 2022-12-21 PROCEDURE — 4010F PR ACE/ARB THEARPY RXD/TAKEN: ICD-10-PCS | Mod: CPTII,S$GLB,,

## 2022-12-21 PROCEDURE — 4010F ACE/ARB THERAPY RXD/TAKEN: CPT | Mod: CPTII,S$GLB,,

## 2022-12-21 PROCEDURE — 3080F DIAST BP >= 90 MM HG: CPT | Mod: CPTII,S$GLB,,

## 2022-12-21 PROCEDURE — 1159F PR MEDICATION LIST DOCUMENTED IN MEDICAL RECORD: ICD-10-PCS | Mod: CPTII,S$GLB,,

## 2022-12-21 PROCEDURE — 99214 PR OFFICE/OUTPT VISIT, EST, LEVL IV, 30-39 MIN: ICD-10-PCS | Mod: S$GLB,,,

## 2022-12-21 PROCEDURE — 3288F FALL RISK ASSESSMENT DOCD: CPT | Mod: CPTII,S$GLB,,

## 2022-12-21 PROCEDURE — 99999 PR PBB SHADOW E&M-EST. PATIENT-LVL IV: CPT | Mod: PBBFAC,,,

## 2022-12-21 PROCEDURE — 1125F AMNT PAIN NOTED PAIN PRSNT: CPT | Mod: CPTII,S$GLB,,

## 2022-12-21 PROCEDURE — 3288F PR FALLS RISK ASSESSMENT DOCUMENTED: ICD-10-PCS | Mod: CPTII,S$GLB,,

## 2022-12-21 RX ORDER — SODIUM CHLORIDE, SODIUM LACTATE, POTASSIUM CHLORIDE, CALCIUM CHLORIDE 600; 310; 30; 20 MG/100ML; MG/100ML; MG/100ML; MG/100ML
INJECTION, SOLUTION INTRAVENOUS CONTINUOUS
Status: CANCELLED | OUTPATIENT
Start: 2022-12-21

## 2022-12-21 NOTE — PROGRESS NOTES
[facility] Follow Up Evaluation    Referred by: Dr. Lerner  Reason for referral: back and neck pain    CC:   Chief Complaint   Patient presents with    Low-back Pain     C/o pain in lower back     Knee Pain     C/o pain in right knee         Last 3 PDI Scores 12/21/2021 11/15/2021   Pain Disability Index (PDI) 6 48     Interval HPI 12/21/2022: Jay Han returns to the clinic for follow up.  Today he is reporting continued lower back pain and right knee pain, 7/10, significantly worsened with physical activities and alleviated with rest.  Continues to deny any significant radicular pain, new numbness, weakness or any new changes to his bowel bladder function.  He continues to take hydrocodone 10/325 mg on average twice daily with good relief of his pain.  He denies any ill side effects or adverse events with his medication.      Initial HPI:   Jay Han is a 77 y.o. male who complains of back and right knee pain.  He's had this pain chronically for many years.  Has been taking hydrocodone as prescribed by his PCP.  Takes care of his disabled daughter at home.  Today his pain is 9/10, constant, sharp, aching, burning.  No numbness or weakness.  Pain can go down the right leg at times.    History:    Current Outpatient Medications:     amiodarone (PACERONE) 200 MG Tab, TAKE 1 TABLET EVERY DAY, Disp: 90 tablet, Rfl: 1    amLODIPine (NORVASC) 10 MG tablet, TAKE 1 TABLET EVERY DAY, Disp: 90 tablet, Rfl: 3    aspirin (ECOTRIN) 81 MG EC tablet, TAKE 1 TABLET EVERY DAY, Disp: 90 tablet, Rfl: 1    ELIQUIS 5 mg Tab, TAKE 1 TABLET TWICE DAILY, Disp: 180 tablet, Rfl: 1    enalapril (VASOTEC) 20 MG tablet, TAKE 2 TABLETS EVERY DAY, Disp: 180 tablet, Rfl: 3    hydroCHLOROthiazide (HYDRODIURIL) 25 MG tablet, Take 1 tablet (25 mg total) by mouth once daily., Disp: 90 tablet, Rfl: 3    HYDROcodone-acetaminophen (NORCO)  mg per tablet, Take 1 tablet by mouth every 12 (twelve) hours as needed for Pain., Disp:  60 tablet, Rfl: 0    methocarbamoL (ROBAXIN) 500 MG Tab, Take 500 mg by mouth 4 (four) times daily. prn, Disp: , Rfl:     metoprolol tartrate (LOPRESSOR) 25 MG tablet, TAKE 1/2 TABLET TWICE DAILY, Disp: 90 tablet, Rfl: 1    potassium chloride SA (K-DUR,KLOR-CON) 20 MEQ tablet, TAKE 1 TABLET THREE TIMES WEEKLY (Patient taking differently: Take 20 mEq by mouth. Pt reports he takes this maybe once every 3 months or so.), Disp: 39 tablet, Rfl: 11    pravastatin (PRAVACHOL) 40 MG tablet, TAKE 1 TABLET EVERY DAY, Disp: 90 tablet, Rfl: 1    Past Medical History:   Diagnosis Date    Arthritis     Coronary artery disease     Dyslipidemia 1/14/2016    ED (erectile dysfunction)     Essential hypertension 1/14/2016    Essential hypertension, benign     H/O asbestos exposure     Hearing aid worn     History of chicken pox     History of wheezing     Hx of sarcoidosis     Hyperglycemia     Knee pain, chronic     right    Lipoprotein deficiencies     Lumbago     Lumbar herniated disc     Nonsustained ventricular tachycardia 1/14/2016    Other and unspecified hyperlipidemia     Other malaise and fatigue     Presence of stent in LAD coronary artery 2/11/2016    Presence of stent in left circumflex coronary artery 3/10/2016    Pure hyperglyceridemia     S/P coronary artery stent placement 3/10/2016    LAD, LCX    Shoulder pain     left shoulder    Ventricular premature complexes 1/14/2016       Past Surgical History:   Procedure Laterality Date    CARDIAC PACEMAKER PLACEMENT      CHOLECYSTECTOMY      COLONOSCOPY N/A 10/26/2021    Dr. Kaur    CORONARY STENT PLACEMENT  01/2016    INSERTION OF PACEMAKER  2018    LUNG BIOPSY      benign       Family History   Problem Relation Age of Onset    Hypertension Mother     Hypertension Maternal Grandmother     Hypertension Maternal Grandfather     Kidney disease Father     Heart disease Maternal Uncle         heart attack, smoker    Glaucoma Neg Hx        Social History     Socioeconomic  "History    Marital status: Single   Tobacco Use    Smoking status: Never    Smokeless tobacco: Never   Substance and Sexual Activity    Alcohol use: Yes     Alcohol/week: 1.0 standard drink     Types: 1 Cans of beer per week     Comment: "very moderate"     Drug use: No    Sexual activity: Not Currently     Social Determinants of Health     Financial Resource Strain: Unknown    Difficulty of Paying Living Expenses: Patient refused   Food Insecurity: Unknown    Worried About Running Out of Food in the Last Year: Patient refused    Ran Out of Food in the Last Year: Patient refused   Transportation Needs: Unknown    Lack of Transportation (Medical): Patient refused    Lack of Transportation (Non-Medical): Patient refused   Physical Activity: Inactive    Days of Exercise per Week: 0 days    Minutes of Exercise per Session: 0 min   Stress: Stress Concern Present    Feeling of Stress : To some extent   Social Connections: Unknown    Frequency of Communication with Friends and Family: More than three times a week    Frequency of Social Gatherings with Friends and Family: More than three times a week    Active Member of Clubs or Organizations: No    Attends Club or Organization Meetings: Never    Marital Status:    Housing Stability: Low Risk     Unable to Pay for Housing in the Last Year: No    Number of Places Lived in the Last Year: 1    Unstable Housing in the Last Year: No       Review of patient's allergies indicates:  No Known Allergies    Review of Systems:  General ROS: negative for - fever  Psychological ROS: negative for - hostility  Hematological and Lymphatic ROS: negative for - bleeding problems  Endocrine ROS: negative for - unexpected weight changes  Respiratory ROS: no cough, shortness of breath, or wheezing  Cardiovascular ROS: no chest pain or dyspnea on exertion  Gastrointestinal ROS: no abdominal pain, change in bowel habits, or black or bloody stools  Musculoskeletal ROS: negative for - " "muscular weakness  Neurological ROS: negative for - numbness/tingling  Dermatological ROS: negative for rash    Physical Exam:  Vitals:    12/21/22 0903   BP: (!) 142/103   Pulse: 62   Weight: 123.8 kg (272 lb 13.1 oz)   Height: 5' 9" (1.753 m)   PainSc:   7   PainLoc: Back       Body mass index is 40.29 kg/m².     Gen: NAD  Gait: gait intact  Psych:  Mood appropriate for given condition  HEENT: eyes anicteric   GI: Abd soft  CV: RRR  Lungs: breathing unlabored   ROM: limited AROM of the L spine in all planes, full ROM at ankles, knees and hips  Lumbar flexion 90 degrees, extension 50 degrees, side bending 30 degrees.    Sensation: intact to light touch in all dermatomes tested from L2-S1 bilaterally  Reflexes: 0/0 b/l patella and achilles  Palpation: Diffusely tender over lumbar paraspinals  -TTP over the b/l greater trochanters and bilateral SI joint  Tone: normal in the b/l knees and hips   Skin: intact  Extremities: No edema in b/l ankles or hands  Provacative tests: + b/l axial facet loading       Right Left   L2/3 Iliacus Hip flexion  5  5   L3/4 Qudratus Femoris Knee Extension  5  5   L4/5 Tib Anterior Ankle Dorsiflexion   5  5   L5/S1 Extensor Hallicus Longus Great toe extension  5  5                 S1/S2 Gastroc/Soleus Plantar Flexion  5  5       Imaging:  Xray lumbar spine 11/18/21  FINDINGS:  There is 4 mm anterolisthesis L2 on L3 which persists with flexion and extension position.  There is levoscoliosis centered at L3.  No pars defects.  No displaced fracture with preserved vertebral body heights.  Bulky partially bridging osteophytes at several lower thoracic levels and several lower lumbar levels most notably about L5.  Degenerative disc disease mild at L2-3 and moderate at L3-4 through L5-S1.  Facet degenerative change lowest 4 lumbar levels.  Cholecystectomy clips.  Atherosclerosis.  Partially imaged transvenous pacing wire.    Xray right knee 11/18/21  FINDINGS:  Marked medial joint space " narrowing is noted bilaterally.  There is moderate tricompartmental osteophyte formation.  There is a joint effusion on the right.  I do not see evidence of acute fracture.    X-ray lumbar spine 5/27/2022  FINDINGS:  There is a 12 degree levocurvature of the lumbar spine with associated rotatory component.  There is mild grade 1 retrolisthesis of L3 on L4.  There is loss of normal lordosis.  Alignment is otherwise within normal limits.  There is loss of disc height at L4/5, L5/S1 and to a lesser degree at L3/4.  There is associated anterior spondylosis, posterior spondylosis, sclerotic discogenic endplate changes and marked facet arthropathy.  These findings are most pronounced at L5/S1.  There is no displaced fracture or dislocation.     Vertebral body heights are preserved.  Arterial calcifications are present.     Impression:     1. Advanced multilevel disc and joint disease.    Labs:  BMP  Lab Results   Component Value Date     07/28/2022    K 3.7 07/28/2022     07/28/2022    CO2 26 07/28/2022    BUN 23 (H) 07/28/2022    CREATININE 0.74 07/28/2022    CALCIUM 9.9 07/28/2022    ANIONGAP 9 07/28/2022    ESTGFRAFRICA >60 07/28/2022    EGFRNONAA >60 07/28/2022     Lab Results   Component Value Date    ALT 22 07/28/2022    AST 27 07/28/2022    ALKPHOS 78 07/28/2022    BILITOT 1.0 07/28/2022       Assessment:   Problem List Items Addressed This Visit          Orthopedic    Chronic knee pain    Chronic back pain     Other Visit Diagnoses       Lumbar spondylosis    -  Primary    Relevant Orders    Case Request Operating Room: Block-nerve-medial branch-lumbar L4/5, L5/S1 (Completed)    Pain management contract signed        Chronic pain disorder                    77 y.o. year old male with PMH CAD s/p stents, a-fib, MORRIS, HTN who complains of back and right knee pain.  He's had this pain chronically for many years.  Has been taking hydrocodone as prescribed by his PCP.  Takes care of his disabled daughter at  home.  Today his pain is 9/10, constant, sharp, aching, burning.  No numbness or weakness.  Pain can go down the right leg at times.    12/21/2022: Jay Han returns to the clinic for follow up.  Today he is reporting continued lower back pain and right knee pain, 7/10, significantly worsened with physical activities and alleviated with rest.  Continues to deny any significant radicular pain, new numbness, weakness or any new changes to his bowel bladder function.  He continues to take hydrocodone 10/325 mg on average twice daily with good relief of his pain.  He denies any ill side effects or adverse events with his medication.    - on exam he has full strength  he has reproducible pain with bilateral axial facet loading.  - I personally reviewed his lumbar x-ray is consistent with multilevel facet arthropathy worse at L4/5 and L5/S1.  - his pain is limiting his mobility interfering with his ADLs.  - continue hydrocodone 10/325mg po bid prn for severe pain.  No signs of abuse, no adverse events noted, patient experiences significant benefit of analgesia, and patient demonstrates increased activity while on these medications.  The patient is meeting the goals of opioid therapy and is dependent on them for functionality and can not perform ADLS without them. Regarding opioids, the risks were discussed including tolerance, addiction, overdose, over-sedation, drug interactions, respiratory depression, opioid-induced hyperalgesia, and even death.    - he was previously not ready to proceed with any interventional procedures however at this time he would like to proceed.  - I believe he is having axial facet mediated pain.  For this I would like to schedule him for a bilateral L4/5 and L5/S1 diagnostic medial branch block.  If successful we will repeat the blocks prior to proceeding with radiofrequency ablation.  - follow-up 4 weeks post procedure or sooner if needed.  - patient was previously refill for his pain  medication on 12/17/2022.  No refill requested.    : Reviewed and consistent with medication use as prescribed.    The above note was completed, in part, with the aid of Dragon dictation software/hardware. Translation errors may be present.

## 2023-01-05 ENCOUNTER — TELEPHONE (OUTPATIENT)
Dept: SURGERY | Facility: HOSPITAL | Age: 78
End: 2023-01-05
Payer: MEDICARE

## 2023-01-05 NOTE — TELEPHONE ENCOUNTER
Patient states that you guys discussed knee ablation as well as back ablation. He would like to do the knee first. He would like to cancel the ablation on Jan 10th and get set up for ablation of the knee. Can you please give orders if you agree ?

## 2023-01-05 NOTE — TELEPHONE ENCOUNTER
Pt is scheduled to have a procedure on 1/10/22. Pt stated that he needs to have a procedure on his knee first before he has a procedure on his back. Please contact pt regarding is concerns.    Thank you!

## 2023-01-06 NOTE — TELEPHONE ENCOUNTER
Please reach out to patient and inquire about the relief he received from previous intra-articular right knee injection.

## 2023-01-06 NOTE — TELEPHONE ENCOUNTER
Are you okay with adjusting this procedure to a right genicular nerve block in lieu of the diagnostic medial branch blocks?

## 2023-01-06 NOTE — TELEPHONE ENCOUNTER
He received a right intraarticular knee steroid knee injection with Dr. Barakat in 11/2021.     If he felt like he got 0 relief from that injection then we can proceed with diagnostic genicular nerve block.    If he feels like he got good relief from that injection at the time and it's worn off then I recommend a referral to orthopedics for repeat right intraarticular knee injection.

## 2023-01-09 NOTE — TELEPHONE ENCOUNTER
Physician - Dr Marquis    Type of Procedure/Injection - diagnostic genicular nerve block      Laterality - Right      Type of Sedation - Local      Need to hold medication - no      N/A      Clearance needed - no      Follow up - phone call next day

## 2023-01-09 NOTE — TELEPHONE ENCOUNTER
Spoke with pt and he advised he does not remember having any relief from the injection from Dr. Barakat. If you approve, he would like to have the genicular block and ablation, however he will need to callback at a later time to get this rescheduled.    Could you advise on dot phrase and I will get the orders changed for when he calls back?

## 2023-01-09 NOTE — TELEPHONE ENCOUNTER
Attempted to call pt, left message on voicemail to return call to clinic.     Patient Requesting a refill.    Medication(s) for Vinicio Lacy submitted for a refill request and is pending approval from the Provider.    Caller has been advised that their call does not guarantee an immediate refill. This refill will be reviewed within 24-72 hours by a qualified provider who will determine whether he or she can refill the medication.    Patient has contacted the pharmacy?  Yes    Call Back Number: [phone]    Can a detailed message be left? Yes_No_---: Yes    Additional information:     NA    Patient’s preferred pharmacy has been noted and populated.       [facility] [address] [place] [id number]  Phone: [phone] Fax: [phone]

## 2023-02-28 ENCOUNTER — PATIENT MESSAGE (OUTPATIENT)
Dept: PAIN MEDICINE | Facility: CLINIC | Age: 78
End: 2023-02-28
Payer: MEDICARE

## 2023-03-14 DIAGNOSIS — Z02.89 PAIN MANAGEMENT CONTRACT SIGNED: ICD-10-CM

## 2023-03-14 DIAGNOSIS — G89.29 CHRONIC BILATERAL LOW BACK PAIN WITHOUT SCIATICA: ICD-10-CM

## 2023-03-14 DIAGNOSIS — M54.50 CHRONIC BILATERAL LOW BACK PAIN WITHOUT SCIATICA: ICD-10-CM

## 2023-03-15 RX ORDER — HYDROCODONE BITARTRATE AND ACETAMINOPHEN 10; 325 MG/1; MG/1
1 TABLET ORAL EVERY 12 HOURS PRN
Qty: 60 TABLET | Refills: 0 | Status: SHIPPED | OUTPATIENT
Start: 2023-03-15 | End: 2023-04-13 | Stop reason: SDUPTHER

## 2023-03-27 ENCOUNTER — PATIENT MESSAGE (OUTPATIENT)
Dept: PAIN MEDICINE | Facility: CLINIC | Age: 78
End: 2023-03-27

## 2023-03-27 ENCOUNTER — OFFICE VISIT (OUTPATIENT)
Dept: PAIN MEDICINE | Facility: CLINIC | Age: 78
End: 2023-03-27
Payer: MEDICARE

## 2023-03-27 VITALS
DIASTOLIC BLOOD PRESSURE: 75 MMHG | SYSTOLIC BLOOD PRESSURE: 146 MMHG | WEIGHT: 270.38 LBS | BODY MASS INDEX: 40.05 KG/M2 | HEIGHT: 69 IN | HEART RATE: 63 BPM

## 2023-03-27 DIAGNOSIS — G89.29 CHRONIC PAIN OF RIGHT KNEE: Primary | ICD-10-CM

## 2023-03-27 DIAGNOSIS — M25.561 CHRONIC PAIN OF RIGHT KNEE: Primary | ICD-10-CM

## 2023-03-27 DIAGNOSIS — M17.11 OSTEOARTHRITIS OF RIGHT KNEE, UNSPECIFIED OSTEOARTHRITIS TYPE: ICD-10-CM

## 2023-03-27 PROCEDURE — 1159F PR MEDICATION LIST DOCUMENTED IN MEDICAL RECORD: ICD-10-PCS | Mod: CPTII,S$GLB,, | Performed by: ANESTHESIOLOGY

## 2023-03-27 PROCEDURE — 99999 PR PBB SHADOW E&M-EST. PATIENT-LVL IV: CPT | Mod: PBBFAC,,, | Performed by: ANESTHESIOLOGY

## 2023-03-27 PROCEDURE — 1125F AMNT PAIN NOTED PAIN PRSNT: CPT | Mod: CPTII,S$GLB,, | Performed by: ANESTHESIOLOGY

## 2023-03-27 PROCEDURE — 99214 PR OFFICE/OUTPT VISIT, EST, LEVL IV, 30-39 MIN: ICD-10-PCS | Mod: S$GLB,,, | Performed by: ANESTHESIOLOGY

## 2023-03-27 PROCEDURE — 1125F PR PAIN SEVERITY QUANTIFIED, PAIN PRESENT: ICD-10-PCS | Mod: CPTII,S$GLB,, | Performed by: ANESTHESIOLOGY

## 2023-03-27 PROCEDURE — 3078F PR MOST RECENT DIASTOLIC BLOOD PRESSURE < 80 MM HG: ICD-10-PCS | Mod: CPTII,S$GLB,, | Performed by: ANESTHESIOLOGY

## 2023-03-27 PROCEDURE — 3288F PR FALLS RISK ASSESSMENT DOCUMENTED: ICD-10-PCS | Mod: CPTII,S$GLB,, | Performed by: ANESTHESIOLOGY

## 2023-03-27 PROCEDURE — 3288F FALL RISK ASSESSMENT DOCD: CPT | Mod: CPTII,S$GLB,, | Performed by: ANESTHESIOLOGY

## 2023-03-27 PROCEDURE — 1101F PR PT FALLS ASSESS DOC 0-1 FALLS W/OUT INJ PAST YR: ICD-10-PCS | Mod: CPTII,S$GLB,, | Performed by: ANESTHESIOLOGY

## 2023-03-27 PROCEDURE — 3077F PR MOST RECENT SYSTOLIC BLOOD PRESSURE >= 140 MM HG: ICD-10-PCS | Mod: CPTII,S$GLB,, | Performed by: ANESTHESIOLOGY

## 2023-03-27 PROCEDURE — 1159F MED LIST DOCD IN RCRD: CPT | Mod: CPTII,S$GLB,, | Performed by: ANESTHESIOLOGY

## 2023-03-27 PROCEDURE — 99999 PR PBB SHADOW E&M-EST. PATIENT-LVL IV: ICD-10-PCS | Mod: PBBFAC,,, | Performed by: ANESTHESIOLOGY

## 2023-03-27 PROCEDURE — 99214 OFFICE O/P EST MOD 30 MIN: CPT | Mod: S$GLB,,, | Performed by: ANESTHESIOLOGY

## 2023-03-27 PROCEDURE — 3078F DIAST BP <80 MM HG: CPT | Mod: CPTII,S$GLB,, | Performed by: ANESTHESIOLOGY

## 2023-03-27 PROCEDURE — 1101F PT FALLS ASSESS-DOCD LE1/YR: CPT | Mod: CPTII,S$GLB,, | Performed by: ANESTHESIOLOGY

## 2023-03-27 PROCEDURE — 3077F SYST BP >= 140 MM HG: CPT | Mod: CPTII,S$GLB,, | Performed by: ANESTHESIOLOGY

## 2023-03-27 RX ORDER — SODIUM CHLORIDE, SODIUM LACTATE, POTASSIUM CHLORIDE, CALCIUM CHLORIDE 600; 310; 30; 20 MG/100ML; MG/100ML; MG/100ML; MG/100ML
INJECTION, SOLUTION INTRAVENOUS CONTINUOUS
Status: CANCELLED | OUTPATIENT
Start: 2023-03-27

## 2023-03-27 NOTE — PROGRESS NOTES
[facility] Follow Up Evaluation    Referred by: Dr. Lerner  Reason for referral: back and neck pain    CC:   Chief Complaint   Patient presents with    Back Pain    Knee Pain        Last 3 PDI Scores 12/21/2021 11/15/2021   Pain Disability Index (PDI) 6 48         Interval HPI 3/27/2023: Jay Han returns to the office for follow up.  Today he reports he continues to have lower back pain as well as right knee pain.  He reports his right knee pain is the most painful thing for him today.  Her reports the pain is 7/10, constant, aching, sharp.  Denies any radicular pain, numbness, weakness.  He continues to take hydrocodone 10/325 mg twice a day as needed without any side effects or adverse events.    Initial HPI:   Jay Han is a 78 y.o. male who complains of back and right knee pain.  He's had this pain chronically for many years.  Has been taking hydrocodone as prescribed by his PCP.  Takes care of his disabled daughter at home.  Today his pain is 9/10, constant, sharp, aching, burning.  No numbness or weakness.  Pain can go down the right leg at times.    History:    Current Outpatient Medications:     amiodarone (PACERONE) 200 MG Tab, TAKE 1 TABLET EVERY DAY, Disp: 90 tablet, Rfl: 1    amLODIPine (NORVASC) 10 MG tablet, TAKE 1 TABLET EVERY DAY, Disp: 90 tablet, Rfl: 3    aspirin (ECOTRIN) 81 MG EC tablet, TAKE 1 TABLET EVERY DAY, Disp: 90 tablet, Rfl: 1    ELIQUIS 5 mg Tab, TAKE 1 TABLET TWICE DAILY, Disp: 180 tablet, Rfl: 1    enalapril (VASOTEC) 20 MG tablet, TAKE 2 TABLETS EVERY DAY, Disp: 180 tablet, Rfl: 3    hydroCHLOROthiazide (HYDRODIURIL) 25 MG tablet, Take 1 tablet (25 mg total) by mouth once daily., Disp: 90 tablet, Rfl: 3    HYDROcodone-acetaminophen (NORCO)  mg per tablet, Take 1 tablet by mouth every 12 (twelve) hours as needed for Pain., Disp: 60 tablet, Rfl: 0    methocarbamoL (ROBAXIN) 500 MG Tab, Take 500 mg by mouth 4 (four) times daily. prn, Disp: , Rfl:      metoprolol tartrate (LOPRESSOR) 25 MG tablet, TAKE 1/2 TABLET TWICE DAILY, Disp: 90 tablet, Rfl: 1    pravastatin (PRAVACHOL) 40 MG tablet, TAKE 1 TABLET EVERY DAY, Disp: 90 tablet, Rfl: 1    potassium chloride SA (K-DUR,KLOR-CON) 20 MEQ tablet, TAKE 1 TABLET THREE TIMES WEEKLY (Patient taking differently: Take 20 mEq by mouth. Pt reports he takes this maybe once every 3 months or so.), Disp: 39 tablet, Rfl: 11    Past Medical History:   Diagnosis Date    Arthritis     Coronary artery disease     Dyslipidemia 1/14/2016    ED (erectile dysfunction)     Essential hypertension 1/14/2016    Essential hypertension, benign     H/O asbestos exposure     Hearing aid worn     History of chicken pox     History of wheezing     Hx of sarcoidosis     Hyperglycemia     Knee pain, chronic     right    Lipoprotein deficiencies     Lumbago     Lumbar herniated disc     Nonsustained ventricular tachycardia 1/14/2016    Other and unspecified hyperlipidemia     Other malaise and fatigue     Presence of stent in LAD coronary artery 2/11/2016    Presence of stent in left circumflex coronary artery 3/10/2016    Pure hyperglyceridemia     S/P coronary artery stent placement 3/10/2016    LAD, LCX    Shoulder pain     left shoulder    Ventricular premature complexes 1/14/2016       Past Surgical History:   Procedure Laterality Date    CARDIAC PACEMAKER PLACEMENT      CHOLECYSTECTOMY      COLONOSCOPY N/A 10/26/2021    Dr. Kaur    CORONARY STENT PLACEMENT  01/2016    INSERTION OF PACEMAKER  2018    LUNG BIOPSY      benign       Family History   Problem Relation Age of Onset    Hypertension Mother     Hypertension Maternal Grandmother     Hypertension Maternal Grandfather     Kidney disease Father     Heart disease Maternal Uncle         heart attack, smoker    Glaucoma Neg Hx        Social History     Socioeconomic History    Marital status: Single   Tobacco Use    Smoking status: Never    Smokeless tobacco: Never   Substance and Sexual  "Activity    Alcohol use: Yes     Alcohol/week: 1.0 standard drink     Types: 1 Cans of beer per week     Comment: "very moderate"     Drug use: No    Sexual activity: Not Currently     Social Determinants of Health     Financial Resource Strain: Unknown    Difficulty of Paying Living Expenses: Patient refused   Food Insecurity: Unknown    Worried About Running Out of Food in the Last Year: Patient refused    Ran Out of Food in the Last Year: Patient refused   Transportation Needs: Unknown    Lack of Transportation (Medical): Patient refused    Lack of Transportation (Non-Medical): Patient refused   Physical Activity: Inactive    Days of Exercise per Week: 0 days    Minutes of Exercise per Session: 0 min   Stress: No Stress Concern Present    Feeling of Stress : Only a little   Social Connections: Unknown    Frequency of Communication with Friends and Family: More than three times a week    Frequency of Social Gatherings with Friends and Family: More than three times a week    Active Member of Clubs or Organizations: No    Attends Club or Organization Meetings: Never    Marital Status:    Housing Stability: Unknown    Unable to Pay for Housing in the Last Year: Patient refused    Number of Places Lived in the Last Year: 1    Unstable Housing in the Last Year: Patient refused       Review of patient's allergies indicates:  No Known Allergies    Review of Systems:  General ROS: negative for - fever  Psychological ROS: negative for - hostility  Hematological and Lymphatic ROS: negative for - bleeding problems  Endocrine ROS: negative for - unexpected weight changes  Respiratory ROS: no cough, shortness of breath, or wheezing  Cardiovascular ROS: no chest pain or dyspnea on exertion  Gastrointestinal ROS: no abdominal pain, change in bowel habits, or black or bloody stools  Musculoskeletal ROS: negative for - muscular weakness  Neurological ROS: negative for - numbness/tingling  Dermatological ROS: negative for " "rash    Physical Exam:  Vitals:    03/27/23 0920   BP: (!) 146/75   Pulse: 63   Weight: 122.7 kg (270 lb 6.3 oz)   Height: 5' 9" (1.753 m)   PainSc:   7   PainLoc: Knee       Body mass index is 39.93 kg/m².     Gen: NAD  Gait: gait intact  Psych:  Mood appropriate for given condition  HEENT: eyes anicteric   GI: Abd soft  CV: RRR  Lungs: breathing unlabored   ROM: limited AROM of the L spine in all planes, full ROM at ankles, knees and hips  Lumbar flexion 90 degrees, extension 50 degrees, side bending 30 degrees.    Sensation: intact to light touch in all dermatomes tested from L2-S1 bilaterally  Reflexes: 0/0 b/l patella and achilles  Palpation: Diffusely tender over lumbar paraspinals  -TTP over the b/l greater trochanters and bilateral SI joint  Tone: normal in the b/l knees and hips   Skin: intact  Extremities: No edema in b/l ankles or hands  Provacative tests: + b/l axial facet loading       Right Left   L2/3 Iliacus Hip flexion  5  5   L3/4 Qudratus Femoris Knee Extension  5  5   L4/5 Tib Anterior Ankle Dorsiflexion   5  5   L5/S1 Extensor Hallicus Longus Great toe extension  5  5                 S1/S2 Gastroc/Soleus Plantar Flexion  5  5       Imaging:  Xray lumbar spine 11/18/21  FINDINGS:  There is 4 mm anterolisthesis L2 on L3 which persists with flexion and extension position.  There is levoscoliosis centered at L3.  No pars defects.  No displaced fracture with preserved vertebral body heights.  Bulky partially bridging osteophytes at several lower thoracic levels and several lower lumbar levels most notably about L5.  Degenerative disc disease mild at L2-3 and moderate at L3-4 through L5-S1.  Facet degenerative change lowest 4 lumbar levels.  Cholecystectomy clips.  Atherosclerosis.  Partially imaged transvenous pacing wire.    Xray right knee 11/18/21  FINDINGS:  Marked medial joint space narrowing is noted bilaterally.  There is moderate tricompartmental osteophyte formation.  There is a joint " effusion on the right.  I do not see evidence of acute fracture.    X-ray lumbar spine 5/27/2022  FINDINGS:  There is a 12 degree levocurvature of the lumbar spine with associated rotatory component.  There is mild grade 1 retrolisthesis of L3 on L4.  There is loss of normal lordosis.  Alignment is otherwise within normal limits.  There is loss of disc height at L4/5, L5/S1 and to a lesser degree at L3/4.  There is associated anterior spondylosis, posterior spondylosis, sclerotic discogenic endplate changes and marked facet arthropathy.  These findings are most pronounced at L5/S1.  There is no displaced fracture or dislocation.     Vertebral body heights are preserved.  Arterial calcifications are present.     Impression:     1. Advanced multilevel disc and joint disease.    Labs:  BMP  Lab Results   Component Value Date     07/28/2022    K 3.7 07/28/2022     07/28/2022    CO2 26 07/28/2022    BUN 23 (H) 07/28/2022    CREATININE 0.74 07/28/2022    CALCIUM 9.9 07/28/2022    ANIONGAP 9 07/28/2022    ESTGFRAFRICA >60 07/28/2022    EGFRNONAA >60 07/28/2022     Lab Results   Component Value Date    ALT 22 07/28/2022    AST 27 07/28/2022    ALKPHOS 78 07/28/2022    BILITOT 1.0 07/28/2022       Assessment:   Problem List Items Addressed This Visit          Orthopedic    Chronic knee pain - Primary     Other Visit Diagnoses       Osteoarthritis of right knee, unspecified osteoarthritis type        Relevant Orders    Case Request Operating Room: Block, Nerve, Genicular (Completed)              78 y.o. year old male with PMH CAD s/p stents, a-fib, MORRIS, HTN who complains of back and right knee pain.  He's had this pain chronically for many years.  Has been taking hydrocodone as prescribed by his PCP.  Takes care of his disabled daughter at home.  Today his pain is 9/10, constant, sharp, aching, burning.  No numbness or weakness.  Pain can go down the right leg at times.    3/27/23 - Jay PEÑA Emely returns to the  office for follow up.  Today he reports he continues to have lower back pain as well as right knee pain.  He reports his right knee pain is the most painful thing for him today.  Her reports the pain is 7/10, constant, aching, sharp.  Denies any radicular pain, numbness, weakness.  He continues to take hydrocodone 10/325 mg twice a day as needed without any side effects or adverse events.    - on exam he has full strength    - I personally reviewed his lumbar x-ray is consistent with multilevel facet arthropathy worse at L4/5 and L5/S1.  - independently reviewed x-rays of the bilateral knees and he has severe joint space narrowing.  He has bone-on-bone osteoarthritis  - he has undergone intra-articular corticosteroid injections as well as viscosupplementation into his knee is without significant or prolonged relief  - his knee pain is limiting his mobility interfering with his ADLs.  - he cares for his adult daughter who has Elton's ataxia and does a lot of bending and lifting and mobilization of her.  He will continue hydrocodone 10/325mg po bid prn for severe pain.  No signs of abuse, no adverse events noted, patient experiences significant benefit of analgesia, and patient demonstrates increased activity while on these medications.  The patient is meeting the goals of opioid therapy and is dependent on them for functionality and can not perform ADLS without them. Regarding opioids, the risks were discussed including tolerance, addiction, overdose, over-sedation, drug interactions, respiratory depression, opioid-induced hyperalgesia, and even death.    - has for his knee pain his right knee pain is the most symptomatic.  He isn't sure he can undergo knee replacement at this time as he cares for his daughter.  We will schedule for diagnostic right genicular nerve block followed by RFA if appropriate.  The risks and benefits of this intervention, and alternative therapies were discussed with the patient.  The  discussion of risks included infection, bleeding, need for additional procedures or surgery, nerve damage.  Questions regarding the procedure, risks, expected outcome, and possible side effects were solicited and answered to the patient's satisfaction.  Jay Han wishes to proceed with the injection or procedure.  Written consent was obtained.      : Reviewed and consistent with medication use as prescribed.    The above note was completed, in part, with the aid of Dragon dictation software/hardware. Translation errors may be present.

## 2023-04-10 ENCOUNTER — PATIENT MESSAGE (OUTPATIENT)
Dept: PAIN MEDICINE | Facility: CLINIC | Age: 78
End: 2023-04-10
Payer: MEDICARE

## 2023-05-04 PROBLEM — E66.01 MORBID (SEVERE) OBESITY DUE TO EXCESS CALORIES: Status: ACTIVE | Noted: 2023-05-04

## 2023-06-05 ENCOUNTER — OFFICE VISIT (OUTPATIENT)
Dept: PAIN MEDICINE | Facility: CLINIC | Age: 78
End: 2023-06-05
Payer: MEDICARE

## 2023-06-05 VITALS
DIASTOLIC BLOOD PRESSURE: 60 MMHG | BODY MASS INDEX: 35.72 KG/M2 | HEART RATE: 60 BPM | HEIGHT: 69 IN | SYSTOLIC BLOOD PRESSURE: 120 MMHG | WEIGHT: 241.19 LBS

## 2023-06-05 DIAGNOSIS — M54.50 CHRONIC BILATERAL LOW BACK PAIN WITHOUT SCIATICA: ICD-10-CM

## 2023-06-05 DIAGNOSIS — Z02.89 PAIN MANAGEMENT CONTRACT SIGNED: ICD-10-CM

## 2023-06-05 DIAGNOSIS — G89.4 CHRONIC PAIN DISORDER: ICD-10-CM

## 2023-06-05 DIAGNOSIS — M17.11 OSTEOARTHRITIS OF RIGHT KNEE, UNSPECIFIED OSTEOARTHRITIS TYPE: Primary | ICD-10-CM

## 2023-06-05 DIAGNOSIS — M25.561 CHRONIC PAIN OF RIGHT KNEE: ICD-10-CM

## 2023-06-05 DIAGNOSIS — G89.29 CHRONIC BILATERAL LOW BACK PAIN WITHOUT SCIATICA: ICD-10-CM

## 2023-06-05 DIAGNOSIS — G89.29 CHRONIC PAIN OF RIGHT KNEE: ICD-10-CM

## 2023-06-05 DIAGNOSIS — M47.816 LUMBAR SPONDYLOSIS: ICD-10-CM

## 2023-06-05 PROCEDURE — 3078F DIAST BP <80 MM HG: CPT | Mod: CPTII,S$GLB,,

## 2023-06-05 PROCEDURE — 3074F SYST BP LT 130 MM HG: CPT | Mod: CPTII,S$GLB,,

## 2023-06-05 PROCEDURE — 99214 PR OFFICE/OUTPT VISIT, EST, LEVL IV, 30-39 MIN: ICD-10-PCS | Mod: S$GLB,,,

## 2023-06-05 PROCEDURE — 1125F AMNT PAIN NOTED PAIN PRSNT: CPT | Mod: CPTII,S$GLB,,

## 2023-06-05 PROCEDURE — 1159F MED LIST DOCD IN RCRD: CPT | Mod: CPTII,S$GLB,,

## 2023-06-05 PROCEDURE — 99214 OFFICE O/P EST MOD 30 MIN: CPT | Mod: S$GLB,,,

## 2023-06-05 PROCEDURE — 99999 PR PBB SHADOW E&M-EST. PATIENT-LVL III: ICD-10-PCS | Mod: PBBFAC,,,

## 2023-06-05 PROCEDURE — 1159F PR MEDICATION LIST DOCUMENTED IN MEDICAL RECORD: ICD-10-PCS | Mod: CPTII,S$GLB,,

## 2023-06-05 PROCEDURE — 99999 PR PBB SHADOW E&M-EST. PATIENT-LVL III: CPT | Mod: PBBFAC,,,

## 2023-06-05 PROCEDURE — 3074F PR MOST RECENT SYSTOLIC BLOOD PRESSURE < 130 MM HG: ICD-10-PCS | Mod: CPTII,S$GLB,,

## 2023-06-05 PROCEDURE — 3078F PR MOST RECENT DIASTOLIC BLOOD PRESSURE < 80 MM HG: ICD-10-PCS | Mod: CPTII,S$GLB,,

## 2023-06-05 PROCEDURE — 1125F PR PAIN SEVERITY QUANTIFIED, PAIN PRESENT: ICD-10-PCS | Mod: CPTII,S$GLB,,

## 2023-06-05 RX ORDER — HYDROCODONE BITARTRATE AND ACETAMINOPHEN 10; 325 MG/1; MG/1
1 TABLET ORAL EVERY 12 HOURS PRN
Qty: 60 TABLET | Refills: 0 | Status: SHIPPED | OUTPATIENT
Start: 2023-06-11 | End: 2023-07-12 | Stop reason: SDUPTHER

## 2023-06-05 NOTE — PROGRESS NOTES
[facility] Follow Up Evaluation    Referred by: Dr. Lerner  Reason for referral: back and neck pain    CC:   Chief Complaint   Patient presents with    Knee Pain    Shoulder Pain        Last 3 PDI Scores 12/21/2021 11/15/2021   Pain Disability Index (PDI) 6 48       Interval HPI 6/5/2023: Jay Han returns to the office for follow up.  Returns today with continued low back pain and right knee pain.  His right knee pain is most problematic forearm, 8/10, constant, worse with physical activities, somewhat alleviated with rest.  He denies any pain radiating down his legs, new numbness, weakness or any changes to his bowel or bladder function.  He continues to take hydrocodone 10/325 mg on average twice daily with some relief.  He denies any ill side effects or adverse events with his medication.      Initial HPI:   Jay Han is a 78 y.o. male who complains of back and right knee pain.  He's had this pain chronically for many years.  Has been taking hydrocodone as prescribed by his PCP.  Takes care of his disabled daughter at home.  Today his pain is 9/10, constant, sharp, aching, burning.  No numbness or weakness.  Pain can go down the right leg at times.    History:    Current Outpatient Medications:     amiodarone (PACERONE) 200 MG Tab, TAKE 1 TABLET EVERY DAY, Disp: 90 tablet, Rfl: 1    amLODIPine (NORVASC) 10 MG tablet, TAKE 1 TABLET EVERY DAY, Disp: 90 tablet, Rfl: 3    aspirin (ECOTRIN) 81 MG EC tablet, TAKE 1 TABLET EVERY DAY, Disp: 90 tablet, Rfl: 1    ELIQUIS 5 mg Tab, TAKE 1 TABLET TWICE DAILY, Disp: 180 tablet, Rfl: 1    enalapril (VASOTEC) 20 MG tablet, TAKE 2 TABLETS EVERY DAY, Disp: 180 tablet, Rfl: 3    hydroCHLOROthiazide (HYDRODIURIL) 25 MG tablet, TAKE 1 TABLET EVERY DAY, Disp: 90 tablet, Rfl: 3    HYDROcodone-acetaminophen (NORCO)  mg per tablet, Take 1 tablet by mouth every 12 (twelve) hours as needed for Pain., Disp: 60 tablet, Rfl: 0    methocarbamoL (ROBAXIN) 500 MG  "Tab, Take 500 mg by mouth 4 (four) times daily. prn, Disp: , Rfl:     metoprolol tartrate (LOPRESSOR) 25 MG tablet, TAKE 1/2 TABLET TWICE DAILY, Disp: 90 tablet, Rfl: 1    pravastatin (PRAVACHOL) 40 MG tablet, TAKE 1 TABLET EVERY DAY, Disp: 90 tablet, Rfl: 1    Past Medical History:   Diagnosis Date    Arthritis     Coronary artery disease     Dyslipidemia 1/14/2016    ED (erectile dysfunction)     Essential hypertension 1/14/2016    Essential hypertension, benign     H/O asbestos exposure     Hearing aid worn     History of chicken pox     History of wheezing     Hx of sarcoidosis     Hyperglycemia     Knee pain, chronic     right    Lipoprotein deficiencies     Lumbago     Lumbar herniated disc     Nonsustained ventricular tachycardia 1/14/2016    Other and unspecified hyperlipidemia     Other malaise and fatigue     Presence of stent in LAD coronary artery 2/11/2016    Presence of stent in left circumflex coronary artery 3/10/2016    Pure hyperglyceridemia     S/P coronary artery stent placement 3/10/2016    LAD, LCX    Shoulder pain     left shoulder    Ventricular premature complexes 1/14/2016       Past Surgical History:   Procedure Laterality Date    CARDIAC PACEMAKER PLACEMENT      CHOLECYSTECTOMY      COLONOSCOPY N/A 10/26/2021    Dr. Kaur    CORONARY STENT PLACEMENT  01/2016    INSERTION OF PACEMAKER  2018    LUNG BIOPSY      benign       Family History   Problem Relation Age of Onset    Hypertension Mother     Hypertension Maternal Grandmother     Hypertension Maternal Grandfather     Kidney disease Father     Heart disease Maternal Uncle         heart attack, smoker    Glaucoma Neg Hx        Social History     Socioeconomic History    Marital status: Single   Tobacco Use    Smoking status: Never    Smokeless tobacco: Never   Substance and Sexual Activity    Alcohol use: Yes     Alcohol/week: 1.0 standard drink     Types: 1 Cans of beer per week     Comment: "very moderate"     Drug use: No    Sexual " "activity: Not Currently     Social Determinants of Health     Financial Resource Strain: Unknown    Difficulty of Paying Living Expenses: Patient refused   Food Insecurity: Unknown    Worried About Running Out of Food in the Last Year: Patient refused    Ran Out of Food in the Last Year: Patient refused   Transportation Needs: Unknown    Lack of Transportation (Medical): Patient refused    Lack of Transportation (Non-Medical): Patient refused   Physical Activity: Inactive    Days of Exercise per Week: 0 days    Minutes of Exercise per Session: 0 min   Stress: No Stress Concern Present    Feeling of Stress : Only a little   Social Connections: Unknown    Frequency of Communication with Friends and Family: More than three times a week    Frequency of Social Gatherings with Friends and Family: More than three times a week    Active Member of Clubs or Organizations: No    Attends Club or Organization Meetings: Never    Marital Status:    Housing Stability: Unknown    Unable to Pay for Housing in the Last Year: Patient refused    Number of Places Lived in the Last Year: 1    Unstable Housing in the Last Year: Patient refused       Review of patient's allergies indicates:  No Known Allergies    Review of Systems:  General ROS: negative for - fever  Psychological ROS: negative for - hostility  Hematological and Lymphatic ROS: negative for - bleeding problems  Endocrine ROS: negative for - unexpected weight changes  Respiratory ROS: no cough, shortness of breath, or wheezing  Cardiovascular ROS: no chest pain or dyspnea on exertion  Gastrointestinal ROS: no abdominal pain, change in bowel habits, or black or bloody stools  Musculoskeletal ROS: negative for - muscular weakness  Neurological ROS: negative for - numbness/tingling  Dermatological ROS: negative for rash    Physical Exam:  Vitals:    06/05/23 1022   BP: 120/60   Pulse: 60   Weight: 109.4 kg (241 lb 2.9 oz)   Height: 5' 9" (1.753 m)   PainSc:   8 "   PainLoc: Knee       Body mass index is 35.62 kg/m².     Gen: NAD  Gait: gait intact  Psych:  Mood appropriate for given condition  HEENT: eyes anicteric   GI: Abd soft  CV: RRR  Lungs: breathing unlabored   ROM: limited AROM of the L spine in all planes, full ROM at ankles, knees and hips  Lumbar flexion 90 degrees, extension 50 degrees, side bending 30 degrees.    Sensation: intact to light touch in all dermatomes tested from L2-S1 bilaterally  Reflexes: 0/0 b/l patella and achilles  Palpation: Diffusely tender over lumbar paraspinals  -TTP over the b/l greater trochanters and bilateral SI joint  Tone: normal in the b/l knees and hips   Skin: intact  Extremities: No edema in b/l ankles or hands  Provacative tests: + b/l axial facet loading       Right Left   L2/3 Iliacus Hip flexion  5  5   L3/4 Qudratus Femoris Knee Extension  5  5   L4/5 Tib Anterior Ankle Dorsiflexion   5  5   L5/S1 Extensor Hallicus Longus Great toe extension  5  5                 S1/S2 Gastroc/Soleus Plantar Flexion  5  5       Imaging:  Xray lumbar spine 11/18/21  FINDINGS:  There is 4 mm anterolisthesis L2 on L3 which persists with flexion and extension position.  There is levoscoliosis centered at L3.  No pars defects.  No displaced fracture with preserved vertebral body heights.  Bulky partially bridging osteophytes at several lower thoracic levels and several lower lumbar levels most notably about L5.  Degenerative disc disease mild at L2-3 and moderate at L3-4 through L5-S1.  Facet degenerative change lowest 4 lumbar levels.  Cholecystectomy clips.  Atherosclerosis.  Partially imaged transvenous pacing wire.    Xray right knee 11/18/21  FINDINGS:  Marked medial joint space narrowing is noted bilaterally.  There is moderate tricompartmental osteophyte formation.  There is a joint effusion on the right.  I do not see evidence of acute fracture.    X-ray lumbar spine 5/27/2022  FINDINGS:  There is a 12 degree levocurvature of the lumbar  spine with associated rotatory component.  There is mild grade 1 retrolisthesis of L3 on L4.  There is loss of normal lordosis.  Alignment is otherwise within normal limits.  There is loss of disc height at L4/5, L5/S1 and to a lesser degree at L3/4.  There is associated anterior spondylosis, posterior spondylosis, sclerotic discogenic endplate changes and marked facet arthropathy.  These findings are most pronounced at L5/S1.  There is no displaced fracture or dislocation.     Vertebral body heights are preserved.  Arterial calcifications are present.     Impression:     1. Advanced multilevel disc and joint disease.    Labs:  BMP  Lab Results   Component Value Date     (L) 05/09/2023    K 3.6 05/09/2023    CL 98 05/09/2023    CO2 27 05/09/2023    BUN 14 05/09/2023    CREATININE 0.79 05/09/2023    CALCIUM 10.1 05/09/2023    ANIONGAP 10 05/09/2023    ESTGFRAFRICA >60 07/28/2022    EGFRNONAA >60 07/28/2022     Lab Results   Component Value Date    ALT 28 05/09/2023    AST 34 05/09/2023    ALKPHOS 89 05/09/2023    BILITOT 0.8 05/09/2023       Assessment:   Problem List Items Addressed This Visit          Orthopedic    Chronic knee pain    Chronic back pain     Other Visit Diagnoses       Osteoarthritis of right knee, unspecified osteoarthritis type    -  Primary    Lumbar spondylosis        Pain management contract signed        Chronic pain disorder                    78 y.o. year old male with PMH CAD s/p stents, a-fib, MORRIS, HTN who complains of back and right knee pain.  He's had this pain chronically for many years.  Has been taking hydrocodone as prescribed by his PCP.  Takes care of his disabled daughter at home.  Today his pain is 9/10, constant, sharp, aching, burning.  No numbness or weakness.  Pain can go down the right leg at times.    6/5/2023: Jay Han returns to the office for follow up.  Returns today with continued low back pain and right knee pain.  His right knee pain is most problematic  forearm, 8/10, constant, worse with physical activities, somewhat alleviated with rest.  He denies any pain radiating down his legs, new numbness, weakness or any changes to his bowel or bladder function.  He continues to take hydrocodone 10/325 mg on average twice daily with some relief.  He denies any ill side effects or adverse events with his medication.    - on exam he has full strength    - I personally reviewed his lumbar x-ray is consistent with multilevel facet arthropathy worse at L4/5 and L5/S1, in his x-rays of bilateral knees shows severe joint space narrowing and bone-on-bone and bilateral knees.  - his knee pain is most problematic to him at this time.  He is not found significant relief in the past with intra-articular knee injections  - this pain limits his mobility interferes with his ADLs.  - he was scheduled for a right genicular nerve block in the past however had to cancel due to scheduling conflicts.  - he continues to take care of his daughter with Friedreich's ataxia and this occupies most of his time.  - he continues to take hydrocodone 10/325 mg on average twice daily with good relief.  No signs of abuse, no adverse events noted, patient experiences significant benefit of analgesia, and patient demonstrates increased activity while on these medications.  The patient is meeting the goals of opioid therapy and is dependent on them for functionality and can not perform ADLS without them. Regarding opioids, the risks were discussed including tolerance, addiction, overdose, over-sedation, drug interactions, respiratory depression, opioid-induced hyperalgesia, and even death.    - he has been offered knee replacement however again, due to scheduling conflicts and being the main care provider for his daughter he is unable to undergo surgery.  He is interested in pursuing the genicular nerve block and potential RFA.  He will call us when he has a better understanding of his schedule.  - refill for  hydrocodone 10/325 mg 60 tablets sent to Dr. Marquis today for approval.  - follow-up in 3 months or sooner if needed.  He will call us to schedule genicular nerve block.        : Reviewed and consistent with medication use as prescribed.    The above note was completed, in part, with the aid of Dragon dictation software/hardware. Translation errors may be present.

## 2023-06-08 ENCOUNTER — TELEPHONE (OUTPATIENT)
Dept: PAIN MEDICINE | Facility: CLINIC | Age: 78
End: 2023-06-08

## 2023-06-08 NOTE — TELEPHONE ENCOUNTER
----- Message from Sari Drake sent at 6/6/2023 11:09 AM CDT -----  Regarding: appointment  Contact: Patient  Type: Needs Medical Advice  Who Called:  Patient   Symptoms (please be specific):    How long has patient had these symptoms:    Pharmacy name and phone #:    Best Call Back Number: [phone] (home)     Additional Information: Patient stated that he was wanting to get scheduled  for a knee block. Please contact patient to advise. Thanks!

## 2023-06-09 ENCOUNTER — TELEPHONE (OUTPATIENT)
Dept: SURGERY | Facility: HOSPITAL | Age: 78
End: 2023-06-09
Payer: MEDICARE

## 2023-06-09 ENCOUNTER — TELEPHONE (OUTPATIENT)
Dept: PAIN MEDICINE | Facility: CLINIC | Age: 78
End: 2023-06-09
Payer: MEDICARE

## 2023-06-09 NOTE — TELEPHONE ENCOUNTER
Good afternoon,     Mr. Han would like to schedule a surgical procedure with Dr. Marquis. Please contact him to discuss.    Thanks!

## 2023-06-20 ENCOUNTER — TELEPHONE (OUTPATIENT)
Dept: PAIN MEDICINE | Facility: CLINIC | Age: 78
End: 2023-06-20
Payer: MEDICARE

## 2023-06-20 ENCOUNTER — TELEPHONE (OUTPATIENT)
Dept: SURGERY | Facility: HOSPITAL | Age: 78
End: 2023-06-20
Payer: MEDICARE

## 2023-06-20 NOTE — TELEPHONE ENCOUNTER
----- Message from Raya Lance sent at 6/20/2023  7:34 AM CDT -----  Regarding: cancel procedure  Type:  Needs Medical Advice    Who Called: PT  Symptoms (please be specific): Cancel Procedure     Would the patient rather a call back or a response via [facility]? Call back    Best Call Back Number: [phone]    Additional Information: Pt sts that he needs to cancel his procedure tomorrow. Has to get his son involved will schedule at a later time.  Please advise -- Thank you

## 2023-07-12 DIAGNOSIS — G89.29 CHRONIC BILATERAL LOW BACK PAIN WITHOUT SCIATICA: ICD-10-CM

## 2023-07-12 DIAGNOSIS — Z02.89 PAIN MANAGEMENT CONTRACT SIGNED: ICD-10-CM

## 2023-07-12 DIAGNOSIS — M54.50 CHRONIC BILATERAL LOW BACK PAIN WITHOUT SCIATICA: ICD-10-CM

## 2023-07-12 RX ORDER — HYDROCODONE BITARTRATE AND ACETAMINOPHEN 10; 325 MG/1; MG/1
1 TABLET ORAL EVERY 12 HOURS PRN
Qty: 60 TABLET | Refills: 0 | Status: SHIPPED | OUTPATIENT
Start: 2023-07-12 | End: 2023-07-18

## 2023-07-14 ENCOUNTER — TELEPHONE (OUTPATIENT)
Dept: PAIN MEDICINE | Facility: CLINIC | Age: 78
End: 2023-07-14
Payer: MEDICARE

## 2023-07-14 RX ORDER — HYDROCODONE BITARTRATE AND ACETAMINOPHEN 10; 325 MG/1; MG/1
1 TABLET ORAL EVERY 12 HOURS PRN
Qty: 14 TABLET | Refills: 0 | Status: SHIPPED | OUTPATIENT
Start: 2023-07-14 | End: 2023-07-18

## 2023-07-14 NOTE — TELEPHONE ENCOUNTER
----- Message from Amanda Stephen sent at 7/14/2023  8:03 AM CDT -----  Contact: pt  Type:  RX Refill Request    Who Called: pt    Refill or New Rx:refill    RX Name and Strength:HYDROcodone-acetaminophen (NORCO)  mg per tablet    How is the patient currently taking it? (ex. 1XDay):as directed    Is this a 30 day or 90 day RX:30    Preferred Pharmacy with phone number:  [id number] - [facility], [address] [place] [address], [id number] [place] [id number]  Phone: [phone] Fax: [phone]    Local or Mail Order:Local  Ordering Provider:[facility]  Would the patient rather a call back or a response via [facility]? call  Best Call Back Number:[phone]  Additional Information: Pt states that he need a callback as soon as possible. States that he accidentally requested Rx by mail and he need it sent to the pharm listed. Please advise thank you

## 2023-07-17 ENCOUNTER — TELEPHONE (OUTPATIENT)
Dept: PAIN MEDICINE | Facility: CLINIC | Age: 78
End: 2023-07-17
Payer: MEDICARE

## 2023-07-17 DIAGNOSIS — Z02.89 PAIN MANAGEMENT CONTRACT SIGNED: ICD-10-CM

## 2023-07-17 DIAGNOSIS — M54.50 CHRONIC BILATERAL LOW BACK PAIN WITHOUT SCIATICA: ICD-10-CM

## 2023-07-17 DIAGNOSIS — G89.29 CHRONIC BILATERAL LOW BACK PAIN WITHOUT SCIATICA: ICD-10-CM

## 2023-07-17 NOTE — TELEPHONE ENCOUNTER
----- Message from Alejandra Rice sent at 7/17/2023  8:40 AM CDT -----  Contact: pt  Type:  RX Refill Request    Who Called: pt  Refill or New Rx:refill  RX Name and Strength:HYDROcodone-acetaminophen (NORCO)  mg per tablet  How is the patient currently taking it? (ex. 1XDay):as directed  Is this a 30 day or 90 day RX:30  Preferred Pharmacy with phone number:    [id number] - [facility], [address] [facility], [id number] [place] [id number]  Phone: [phone] Fax: [phone]    Local or Mail Order:Local  Ordering Provider:[facility]  Would the patient rather a call back or a response via [facility]? Call back  Best Call Back Number:[phone]    Additional Information: Please CALL the pt back. This med is NOT to be sent to the mail order pharmacy. It is only to be sent to the local [facility]. He picked up the 7 day supply but is almost out and needs his 30 supple refilled.  Please advise  thanks

## 2023-07-18 RX ORDER — HYDROCODONE BITARTRATE AND ACETAMINOPHEN 10; 325 MG/1; MG/1
1 TABLET ORAL EVERY 12 HOURS PRN
Qty: 60 TABLET | Refills: 0 | Status: SHIPPED | OUTPATIENT
Start: 2023-07-18 | End: 2023-08-11 | Stop reason: SDUPTHER

## 2023-07-19 ENCOUNTER — OFFICE VISIT (OUTPATIENT)
Dept: PAIN MEDICINE | Facility: CLINIC | Age: 78
End: 2023-07-19
Payer: MEDICARE

## 2023-07-19 VITALS
DIASTOLIC BLOOD PRESSURE: 67 MMHG | WEIGHT: 228.5 LBS | SYSTOLIC BLOOD PRESSURE: 124 MMHG | HEIGHT: 69 IN | BODY MASS INDEX: 33.84 KG/M2 | HEART RATE: 60 BPM

## 2023-07-19 DIAGNOSIS — M54.9 DORSALGIA: Primary | ICD-10-CM

## 2023-07-19 DIAGNOSIS — G89.29 CHRONIC PAIN OF RIGHT KNEE: ICD-10-CM

## 2023-07-19 DIAGNOSIS — M25.561 CHRONIC PAIN OF RIGHT KNEE: ICD-10-CM

## 2023-07-19 DIAGNOSIS — G89.4 CHRONIC PAIN DISORDER: ICD-10-CM

## 2023-07-19 PROCEDURE — 3074F SYST BP LT 130 MM HG: CPT | Mod: CPTII,S$GLB,, | Performed by: ANESTHESIOLOGY

## 2023-07-19 PROCEDURE — 80326 AMPHETAMINES 5 OR MORE: CPT | Performed by: ANESTHESIOLOGY

## 2023-07-19 PROCEDURE — 1101F PT FALLS ASSESS-DOCD LE1/YR: CPT | Mod: CPTII,S$GLB,, | Performed by: ANESTHESIOLOGY

## 2023-07-19 PROCEDURE — 1125F PR PAIN SEVERITY QUANTIFIED, PAIN PRESENT: ICD-10-PCS | Mod: CPTII,S$GLB,, | Performed by: ANESTHESIOLOGY

## 2023-07-19 PROCEDURE — 3078F DIAST BP <80 MM HG: CPT | Mod: CPTII,S$GLB,, | Performed by: ANESTHESIOLOGY

## 2023-07-19 PROCEDURE — 3288F FALL RISK ASSESSMENT DOCD: CPT | Mod: CPTII,S$GLB,, | Performed by: ANESTHESIOLOGY

## 2023-07-19 PROCEDURE — 3078F PR MOST RECENT DIASTOLIC BLOOD PRESSURE < 80 MM HG: ICD-10-PCS | Mod: CPTII,S$GLB,, | Performed by: ANESTHESIOLOGY

## 2023-07-19 PROCEDURE — 1159F PR MEDICATION LIST DOCUMENTED IN MEDICAL RECORD: ICD-10-PCS | Mod: CPTII,S$GLB,, | Performed by: ANESTHESIOLOGY

## 2023-07-19 PROCEDURE — 3288F PR FALLS RISK ASSESSMENT DOCUMENTED: ICD-10-PCS | Mod: CPTII,S$GLB,, | Performed by: ANESTHESIOLOGY

## 2023-07-19 PROCEDURE — 1160F PR REVIEW ALL MEDS BY PRESCRIBER/CLIN PHARMACIST DOCUMENTED: ICD-10-PCS | Mod: CPTII,S$GLB,, | Performed by: ANESTHESIOLOGY

## 2023-07-19 PROCEDURE — 1160F RVW MEDS BY RX/DR IN RCRD: CPT | Mod: CPTII,S$GLB,, | Performed by: ANESTHESIOLOGY

## 2023-07-19 PROCEDURE — 1125F AMNT PAIN NOTED PAIN PRSNT: CPT | Mod: CPTII,S$GLB,, | Performed by: ANESTHESIOLOGY

## 2023-07-19 PROCEDURE — 99214 OFFICE O/P EST MOD 30 MIN: CPT | Mod: S$GLB,,, | Performed by: ANESTHESIOLOGY

## 2023-07-19 PROCEDURE — 99214 PR OFFICE/OUTPT VISIT, EST, LEVL IV, 30-39 MIN: ICD-10-PCS | Mod: S$GLB,,, | Performed by: ANESTHESIOLOGY

## 2023-07-19 PROCEDURE — 99999 PR PBB SHADOW E&M-EST. PATIENT-LVL III: CPT | Mod: PBBFAC,,, | Performed by: ANESTHESIOLOGY

## 2023-07-19 PROCEDURE — 1101F PR PT FALLS ASSESS DOC 0-1 FALLS W/OUT INJ PAST YR: ICD-10-PCS | Mod: CPTII,S$GLB,, | Performed by: ANESTHESIOLOGY

## 2023-07-19 PROCEDURE — 1159F MED LIST DOCD IN RCRD: CPT | Mod: CPTII,S$GLB,, | Performed by: ANESTHESIOLOGY

## 2023-07-19 PROCEDURE — 99999 PR PBB SHADOW E&M-EST. PATIENT-LVL III: ICD-10-PCS | Mod: PBBFAC,,, | Performed by: ANESTHESIOLOGY

## 2023-07-19 PROCEDURE — 3074F PR MOST RECENT SYSTOLIC BLOOD PRESSURE < 130 MM HG: ICD-10-PCS | Mod: CPTII,S$GLB,, | Performed by: ANESTHESIOLOGY

## 2023-07-19 RX ORDER — METHYLPREDNISOLONE 4 MG/1
TABLET ORAL
Qty: 1 EACH | Refills: 0 | Status: SHIPPED | OUTPATIENT
Start: 2023-07-19 | End: 2023-08-09

## 2023-07-19 NOTE — PROGRESS NOTES
[facility] Follow Up Evaluation    Referred by: Dr. Lerner  Reason for referral: back and neck pain    CC:   Chief Complaint   Patient presents with    Leg Pain    Hip Pain        Last 3 PDI Scores 12/21/2021 11/15/2021   Pain Disability Index (PDI) 6 48         Interval HPI 7/19/2023: Jay Han returns to the office for follow up.  Reports he continues to have his typical right knee pain.  He also reports about 2 weeks ago he was transitioning out of a chair and developed some pain that is radiating into the right hip and right thigh.  He is not having any numbness or weakness in his legs.  He continues to take hydrocodone 10/325 mg on average twice daily with some relief.  He denies any ill side effects or adverse events with his medication.      Initial HPI:   Jay Han is a 78 y.o. male who complains of back and right knee pain.  He's had this pain chronically for many years.  Has been taking hydrocodone as prescribed by his PCP.  Takes care of his disabled daughter at home.  Today his pain is 9/10, constant, sharp, aching, burning.  No numbness or weakness.  Pain can go down the right leg at times.    History:    Current Outpatient Medications:     amiodarone (PACERONE) 200 MG Tab, TAKE 1 TABLET EVERY DAY, Disp: 90 tablet, Rfl: 1    amLODIPine (NORVASC) 10 MG tablet, TAKE 1 TABLET EVERY DAY, Disp: 90 tablet, Rfl: 3    aspirin (ECOTRIN) 81 MG EC tablet, TAKE 1 TABLET EVERY DAY, Disp: 90 tablet, Rfl: 1    ELIQUIS 5 mg Tab, TAKE 1 TABLET TWICE DAILY, Disp: 180 tablet, Rfl: 1    enalapril (VASOTEC) 20 MG tablet, TAKE 2 TABLETS EVERY DAY, Disp: 180 tablet, Rfl: 3    hydroCHLOROthiazide (HYDRODIURIL) 25 MG tablet, TAKE 1 TABLET EVERY DAY, Disp: 90 tablet, Rfl: 3    HYDROcodone-acetaminophen (NORCO)  mg per tablet, Take 1 tablet by mouth every 12 (twelve) hours as needed for Pain., Disp: 60 tablet, Rfl: 0    methocarbamoL (ROBAXIN) 500 MG Tab, Take 500 mg by mouth 4 (four) times daily. prn,  "Disp: , Rfl:     metoprolol tartrate (LOPRESSOR) 25 MG tablet, TAKE 1/2 TABLET TWICE DAILY, Disp: 90 tablet, Rfl: 1    pravastatin (PRAVACHOL) 40 MG tablet, TAKE 1 TABLET EVERY DAY, Disp: 90 tablet, Rfl: 1    methylPREDNISolone (MEDROL DOSEPACK) 4 mg tablet, use as directed, Disp: 1 each, Rfl: 0    Past Medical History:   Diagnosis Date    Arthritis     Coronary artery disease     Dyslipidemia 1/14/2016    ED (erectile dysfunction)     Essential hypertension 1/14/2016    Essential hypertension, benign     H/O asbestos exposure     Hearing aid worn     History of chicken pox     History of wheezing     Hx of sarcoidosis     Hyperglycemia     Knee pain, chronic     right    Lipoprotein deficiencies     Lumbago     Lumbar herniated disc     Nonsustained ventricular tachycardia 1/14/2016    Other and unspecified hyperlipidemia     Other malaise and fatigue     Presence of stent in LAD coronary artery 2/11/2016    Presence of stent in left circumflex coronary artery 3/10/2016    Pure hyperglyceridemia     S/P coronary artery stent placement 3/10/2016    LAD, LCX    Shoulder pain     left shoulder    Ventricular premature complexes 1/14/2016       Past Surgical History:   Procedure Laterality Date    CARDIAC PACEMAKER PLACEMENT      CHOLECYSTECTOMY      COLONOSCOPY N/A 10/26/2021    Dr. Kaur    CORONARY STENT PLACEMENT  01/2016    INSERTION OF PACEMAKER  2018    LUNG BIOPSY      benign       Family History   Problem Relation Age of Onset    Hypertension Mother     Hypertension Maternal Grandmother     Hypertension Maternal Grandfather     Kidney disease Father     Heart disease Maternal Uncle         heart attack, smoker    Glaucoma Neg Hx        Social History     Socioeconomic History    Marital status: Single   Tobacco Use    Smoking status: Never    Smokeless tobacco: Never   Substance and Sexual Activity    Alcohol use: Yes     Alcohol/week: 1.0 standard drink     Types: 1 Cans of beer per week     Comment: "very " "moderate"     Drug use: No    Sexual activity: Not Currently     Social Determinants of Health     Financial Resource Strain: Unknown    Difficulty of Paying Living Expenses: Patient refused   Food Insecurity: Unknown    Worried About Running Out of Food in the Last Year: Patient refused    Ran Out of Food in the Last Year: Patient refused   Transportation Needs: Unknown    Lack of Transportation (Medical): Patient refused    Lack of Transportation (Non-Medical): Patient refused   Physical Activity: Inactive    Days of Exercise per Week: 0 days    Minutes of Exercise per Session: 0 min   Stress: No Stress Concern Present    Feeling of Stress : Only a little   Social Connections: Unknown    Frequency of Communication with Friends and Family: More than three times a week    Frequency of Social Gatherings with Friends and Family: More than three times a week    Active Member of Clubs or Organizations: No    Attends Club or Organization Meetings: Never    Marital Status:    Housing Stability: Unknown    Unable to Pay for Housing in the Last Year: Patient refused    Number of Places Lived in the Last Year: 1    Unstable Housing in the Last Year: Patient refused       Review of patient's allergies indicates:  No Known Allergies    Review of Systems:  General ROS: negative for - fever  Psychological ROS: negative for - hostility  Hematological and Lymphatic ROS: negative for - bleeding problems  Endocrine ROS: negative for - unexpected weight changes  Respiratory ROS: no cough, shortness of breath, or wheezing  Cardiovascular ROS: no chest pain or dyspnea on exertion  Gastrointestinal ROS: no abdominal pain, change in bowel habits, or black or bloody stools  Musculoskeletal ROS: negative for - muscular weakness  Neurological ROS: negative for - numbness/tingling  Dermatological ROS: negative for rash    Physical Exam:  Vitals:    07/19/23 1005   BP: 124/67   Pulse: 60   Weight: 103.7 kg (228 lb 8.5 oz)   Height: 5' " "9" (1.753 m)   PainSc:   8   PainLoc: Leg       Body mass index is 33.75 kg/m².     Gen: NAD  Gait: gait intact  Psych:  Mood appropriate for given condition  HEENT: eyes anicteric   GI: Abd soft  CV: RRR  Lungs: breathing unlabored   ROM: limited AROM of the L spine in all planes, full ROM at ankles, knees and hips  Reflexes: 0/0 b/l patella and achilles  Palpation:   Tone: normal in the b/l knees and hips   Skin: intact  Extremities: No edema in b/l ankles or hands  Provacative tests: + b/l axial facet loading       Right Left   L2/3 Iliacus Hip flexion  5  5   L3/4 Qudratus Femoris Knee Extension  5  5   L4/5 Tib Anterior Ankle Dorsiflexion   5  5   L5/S1 Extensor Hallicus Longus Great toe extension  5  5                 S1/S2 Gastroc/Soleus Plantar Flexion  5  5       Imaging:  Xray lumbar spine 11/18/21  FINDINGS:  There is 4 mm anterolisthesis L2 on L3 which persists with flexion and extension position.  There is levoscoliosis centered at L3.  No pars defects.  No displaced fracture with preserved vertebral body heights.  Bulky partially bridging osteophytes at several lower thoracic levels and several lower lumbar levels most notably about L5.  Degenerative disc disease mild at L2-3 and moderate at L3-4 through L5-S1.  Facet degenerative change lowest 4 lumbar levels.  Cholecystectomy clips.  Atherosclerosis.  Partially imaged transvenous pacing wire.    Xray right knee 11/18/21  FINDINGS:  Marked medial joint space narrowing is noted bilaterally.  There is moderate tricompartmental osteophyte formation.  There is a joint effusion on the right.  I do not see evidence of acute fracture.    X-ray lumbar spine 5/27/2022  FINDINGS:  There is a 12 degree levocurvature of the lumbar spine with associated rotatory component.  There is mild grade 1 retrolisthesis of L3 on L4.  There is loss of normal lordosis.  Alignment is otherwise within normal limits.  There is loss of disc height at L4/5, L5/S1 and to a lesser " degree at L3/4.  There is associated anterior spondylosis, posterior spondylosis, sclerotic discogenic endplate changes and marked facet arthropathy.  These findings are most pronounced at L5/S1.  There is no displaced fracture or dislocation.     Vertebral body heights are preserved.  Arterial calcifications are present.     Impression:     1. Advanced multilevel disc and joint disease.    Labs:  BMP  Lab Results   Component Value Date     (L) 05/09/2023    K 3.6 05/09/2023    CL 98 05/09/2023    CO2 27 05/09/2023    BUN 14 05/09/2023    CREATININE 0.79 05/09/2023    CALCIUM 10.1 05/09/2023    ANIONGAP 10 05/09/2023    ESTGFRAFRICA >60 07/28/2022    EGFRNONAA >60 07/28/2022     Lab Results   Component Value Date    ALT 28 05/09/2023    AST 34 05/09/2023    ALKPHOS 89 05/09/2023    BILITOT 0.8 05/09/2023       Assessment:   Problem List Items Addressed This Visit          Orthopedic    Chronic knee pain     Other Visit Diagnoses       Dorsalgia    -  Primary    Relevant Medications    methylPREDNISolone (MEDROL DOSEPACK) 4 mg tablet    Other Relevant Orders    Ambulatory referral/consult to Physical/Occupational Therapy    Chronic pain disorder        Relevant Orders    Pain Clinic Drug Screen            78 y.o. year old male with PMH CAD s/p stents, a-fib, MORRIS, HTN who complains of back and right knee pain.  He's had this pain chronically for many years.  Has been taking hydrocodone as prescribed by his PCP.  Takes care of his disabled daughter at home.  Today his pain is 9/10, constant, sharp, aching, burning.  No numbness or weakness.  Pain can go down the right leg at times.    7/19/23 - Jay MARIANA Han returns to the office for follow up.  Reports he continues to have his typical right knee pain.  He also reports about 2 weeks ago he was transitioning out of a chair and developed some pain that is radiating into the right hip and right thigh.  He is not having any numbness or weakness in his legs.  He  continues to take hydrocodone 10/325 mg on average twice daily with some relief.  He denies any ill side effects or adverse events with his medication.    - on exam he has full strength of his lower extremities and intact sensation to light touch  - I suspect either has a soft tissue cause of his acute pain or there could be potential for lumbar disc herniation  - at this time I recommend we maximize conservative care.  I have prescribed him a Medrol Dosepak for inflammatory pain and I have also placed a referral for him to start formal physical therapy  - he continues to take hydrocodone 10/325 mg 1 to 2 times a day as needed for severe breakthrough pain.  o signs of abuse, no adverse events noted, patient experiences significant benefit of analgesia, and patient demonstrates increased activity while on these medications.  The patient is meeting the goals of opioid therapy and is dependent on them for functionality and can not perform ADLS without them. Regarding opioids, the risks were discussed including tolerance, addiction, overdose, over-sedation, drug interactions, respiratory depression, opioid-induced hyperalgesia, and even death.    - I have ordered an updated urine drug screen today.  Previously there was alcohol in his urine and I discussed that he can not be drinking alcohol with this medication and discussed the interactions.  He understands  - we are also going to consider diagnostic genicular nerve block to the right knee in the future once he improves from his acute right-sided thigh pain  - he continues to take care of his daughter with Friedreich's ataxia and this occupies most of his time.  - follow-up in 4-6 weeks or sooner if needed.  If he fails to get relief with conservative care I am going to get a lumbar MRI      : Reviewed and consistent with medication use as prescribed.    The above note was completed, in part, with the aid of Dragon dictation software/hardware. Translation errors  may be present.

## 2023-07-25 LAB
6MAM UR QL: NOT DETECTED
7AMINOCLONAZEPAM UR QL: NOT DETECTED
A-OH ALPRAZ UR QL: NOT DETECTED
ALPHA-OH-MIDAZOLAM: NOT DETECTED
ALPRAZ UR QL: NOT DETECTED
AMPHET UR QL SCN: NOT DETECTED
ANNOTATION COMMENT IMP: NORMAL
ANNOTATION COMMENT IMP: NORMAL
BARBITURATES UR QL: NOT DETECTED
BUPRENORPHINE UR QL: NOT DETECTED
BZE UR QL: NOT DETECTED
CARBOXYTHC UR QL: NOT DETECTED
CARISOPRODOL UR QL: NOT DETECTED
CLONAZEPAM UR QL: NOT DETECTED
CODEINE UR QL: NOT DETECTED
CREAT UR-MCNC: 105.7 MG/DL (ref 20–400)
DIAZEPAM UR QL: NOT DETECTED
ETHYL GLUCURONIDE UR QL: NOT DETECTED
FENTANYL UR QL: NOT DETECTED
GABAPENTIN: NOT DETECTED
HYDROCODONE UR QL: PRESENT
HYDROMORPHONE UR QL: PRESENT
LORAZEPAM UR QL: NOT DETECTED
MDA UR QL: NOT DETECTED
MDEA UR QL: NOT DETECTED
MDMA UR QL: NOT DETECTED
ME-PHENIDATE UR QL: NOT DETECTED
METHADONE UR QL: NOT DETECTED
METHAMPHET UR QL: NOT DETECTED
MIDAZOLAM UR QL SCN: NOT DETECTED
MORPHINE UR QL: NOT DETECTED
NALOXONE: NOT DETECTED
NORBUPRENORPHINE UR QL CFM: NOT DETECTED
NORDIAZEPAM UR QL: NOT DETECTED
NORFENTANYL UR QL: NOT DETECTED
NORHYDROCODONE UR QL CFM: PRESENT
NORMEPERIDINE UR QL CFM: NOT DETECTED
NOROXYCODONE UR QL CFM: NOT DETECTED
NOROXYMORPHONE UR QL SCN: NOT DETECTED
OXAZEPAM UR QL: NOT DETECTED
OXYCODONE UR QL: NOT DETECTED
OXYMORPHONE UR QL: NOT DETECTED
PATHOLOGY STUDY: NORMAL
PCP UR QL: NOT DETECTED
PHENTERMINE UR QL: NOT DETECTED
PREGABALIN: NOT DETECTED
SERVICE CMNT-IMP: NORMAL
TAPENTADOL UR QL SCN: NOT DETECTED
TAPENTADOL UR QL SCN: NOT DETECTED
TEMAZEPAM UR QL: NOT DETECTED
TRAMADOL UR QL: NOT DETECTED
ZOLPIDEM METABOLITE: NOT DETECTED
ZOLPIDEM UR QL: NOT DETECTED

## 2023-08-15 ENCOUNTER — CLINICAL SUPPORT (OUTPATIENT)
Dept: REHABILITATION | Facility: HOSPITAL | Age: 78
End: 2023-08-15
Attending: ANESTHESIOLOGY
Payer: MEDICARE

## 2023-08-15 DIAGNOSIS — M54.50 CHRONIC BILATERAL LOW BACK PAIN WITHOUT SCIATICA: ICD-10-CM

## 2023-08-15 DIAGNOSIS — G89.29 CHRONIC BILATERAL LOW BACK PAIN WITHOUT SCIATICA: ICD-10-CM

## 2023-08-15 DIAGNOSIS — M53.86 DECREASED ROM OF LUMBAR SPINE: ICD-10-CM

## 2023-08-15 DIAGNOSIS — M54.9 DORSALGIA: ICD-10-CM

## 2023-08-15 PROCEDURE — 97161 PT EVAL LOW COMPLEX 20 MIN: CPT | Mod: PO | Performed by: PHYSICAL THERAPIST

## 2023-08-15 NOTE — PLAN OF CARE
[facility]   Physical Therapy Initial Evaluation      Name: Jay PEÑA [facility] Number: [id number]    Therapy Diagnosis:   Encounter Diagnoses   Name Primary?    Dorsalgia     Chronic bilateral low back pain without sciatica     Decreased ROM of lumbar spine         Physician: Micheal Marquis MD    Physician Orders: PT Eval and Treat   Medical Diagnosis from Referral:   Dorsalgia   Evaluation Date: 8/15/2023  Authorization Period Expiration: 2024  Plan of Care Expiration: 10/13/2023  Progress Note Due: 9/15/2023  Visit # / Visits authorized: 1   FOTO: 1/3    Precautions: Standard, blood thinners, Fall, and pacemaker     Time In: 0900  Time Out: 61486  Total Appointment Time (timed & untimed codes): 60 minutes    Subjective     Date of onset: 2023    History of current condition - Jay reports: having low back pain, right sided noted, intermittent with radicular pain as well pending on position. Patient reported having to get information on his right knee due to having brace on at this time, OA noted with getting advice on TKR. No recent falls noted but does favor left side. 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     Falls: 6 months  ago    Imagin2023  There is a 12 degree levocurvature of the lumbar spine with associated rotatory component.  There is mild grade 1 retrolisthesis of L3 on L4.  There is loss of normal lordosis.  Alignment is otherwise within normal limits.  There is loss of disc height at L4/5, L5/S1 and to a lesser degree at L3/4.  There is associated anterior spondylosis, posterior spondylosis, sclerotic discogenic endplate changes and marked facet arthropathy.  These findings are most pronounced at L5/S1.  There is no displaced fracture or dislocation.     Vertebral body heights are preserved.  Arterial calcifications are present.     Impression:     1. Advanced multilevel disc and joint disease  Prior Therapy:  none  Social History:  lives with their daughter, cane use for a year due to back pain, right knee pain, balance  Occupation: Retired  Prior Level of Function: independent  Current Level of Function: modified independent, increase time noted with home management, community ambulation    Pain:  Current 3/10, worst 8/10, best 3/10   Location: bilateral  R>L side low back  Description: Aching, Dull, Grabbing, Tight, and Variable  Aggravating Factors: Sitting, Standing, Bending, Morning, Extension, Flexing, Lifting, and Getting out of bed/chair  Easing Factors: rest and change in positions    Patients goals: Decrease pain to low back, improve motion and leg strength     Medical History:   Past Medical History:   Diagnosis Date    Arthritis     Coronary artery disease     Dyslipidemia 1/14/2016    ED (erectile dysfunction)     Essential hypertension 1/14/2016    Essential hypertension, benign     H/O asbestos exposure     Hearing aid worn     History of chicken pox     History of wheezing     Hx of sarcoidosis     Hyperglycemia     Knee pain, chronic     right    Lipoprotein deficiencies     Lumbago     Lumbar herniated disc     Nonsustained ventricular tachycardia 1/14/2016    Other and unspecified hyperlipidemia     Other malaise and fatigue     Presence of stent in LAD coronary artery 2/11/2016    Presence of stent in left circumflex coronary artery 3/10/2016    Pure hyperglyceridemia     S/P coronary artery stent placement 3/10/2016    LAD, LCX    Shoulder pain     left shoulder    Ventricular premature complexes 1/14/2016       Surgical History:   Jay Han  has a past surgical history that includes Cholecystectomy; Lung biopsy; Colonoscopy (N/A, 10/26/2021); Coronary stent placement (01/2016); Cardiac pacemaker placement; and Insertion of pacemaker (2018).    Medications:   Jay has a current medication list which includes the following prescription(s): amiodarone, amlodipine, aspirin, eliquis, enalapril,  "hydrochlorothiazide, [START ON 2023] hydrocodone-acetaminophen, methocarbamol, metoprolol tartrate, and pravastatin.    Allergies:   Review of patient's allergies indicates:  No Known Allergies     Objective      Posture observation:  Sitting: lordotic/neutral/kyphotic: kyphotic  Change in posture: better/worse/same: better  Standing: lordotic/neutral/kyphotic: neutral                               Lateral shift: right/left/nil: left  Shift relevant: yes/no: yes and right knee pain as well.  Observations: redness, swelling and or atrophy noted?    Neurological:  Sensation: Dermatomes     Right Left Comment   L2 (lateral thigh) intact intact    L3 (medial thigh) intact intact    L4 (medial calf) intact intact    L5 (lateral calf) intact intact    S1 (lateral foot) intact intact    S2 (gastro/HS) intact intact    Saddle (cauda equina) intact intact      Myotomes:   Right Left Comment   Hip flexion (L2-3): 4-/5 4+/5    Knee extension (L3-4): 4-/5 4+/5    DF (L4-5): 4+/5 5/5    Great Toe Ext (L5-S1):   4+/5 5/5    Glut Medius (L5) 4-/5 4-/5    Great Toe Flex/HS (S1-S2) 4+/5 5/5      DTR:   Right Left Comment   Patellar (L3-4) 2+ 2+    Achilles (S1) 2+ 2+      Movement Loss  Thoracic/Lumbar AROM:     % limitation Pain/Dysfunction/movement   Flexion  (55-60 N)    50% Cannot touch toes  Non-uniform curvature     Extension (25 N)  75% UE does not maintain 170  ASIS does not clear toes  Lack of posterior weight shift     Repeated Movements:  Flexion in standing: x 5, increase, NW  Extension in standing: x 5 increase, NW    Static Tests  Sitting slouched/erect/lying prone in extension/long sitting: (deformities/time factor)  -looking as a provocative test as well/derangement    Bridge test/Endurance: (mean= 76.7 " compared to no low back pain, 172.9") + right hip drop      Lumbar:   SLR (with leg dominant pain)-  Slump  Clonus-    UMN testing:  Cross over sign: -    TU.50" without AD, supervised  Chair rise test: x " "5 with UE support, 19.50"    Palpation for condition/position, PIVM:  Point tenderness to bilateral lumbar paraspinals    GAIT: Jay ambulates 50 feet with SPC with modified independence.     GAIT DEVIATIONS: decreased keshav, decreased step length RLE, low back pain, right knee pain (antalgic)    Transfers: modified independent, increase time noted with sit to stands    Pt/family was provided educational information, including: role of PT, goals for PT, scheduling - pt verbalized understanding. Discussed insurance limitations with pt.      Patient responses to functional health questions that indicate dysfunction were as follows:  Question Response at Intake  Pain Intensity Pain medication provides me with moderate relief from  pain.  Personal Care (eg, Washing, Dressing) It is painful to take care of myself, and I am slow and  careful.  Lifting I can lift only very light weights.  Walking I can only walk with crutches or a cane.  Sitting Pain prevents me from sitting for more than 1 hour.  Standing Pain prevents me from standing more than 10 minutes.  Sleeping Even when I take pain medication, I sleep less than 6  hours.  Social Life Pain prevents me from going out very often.  Traveling I can travel anywhere, but it increases my pain.  Employment/Homemaking I can perform most of my homemaking/job duties, but  pain prevents me from performing more physically  stressful activities (eg, lifting, vacuuming).           Treatment     Total Treatment time (time-based codes) separate from Evaluation: 5 minutes     Jay received the treatments listed below:      therapeutic exercises to develop strength, endurance, ROM, flexibility, posture, and core stabilization for 5 minutes including:  Seated: ergo with lumbar roll  Supine: bridge, glut sets  S/l clam shells    Patient Education and Home Exercises     Education provided:   - Yes    Written Home Exercises Provided: yes. Exercises were reviewed and Jay was able to " demonstrate them prior to the end of the session.  Jay demonstrated good  understanding of the education provided. See EMR under Patient Instructions for exercises provided during therapy sessions.    Assessment     Jay is a 78 y.o. male referred to outpatient Physical Therapy with a medical diagnosis of Dorsalgia . Patient presents with low back pain, decreased lumbar mobility, hip/core weakness, right knee pain, gait abnormality.    Problem List: pain, decreased ROM, decreased flexibility, decreased strength, decreased balance and stability, decreased motor control, antalgic gait, inability to participate fully in vocation pursuits, and decreased ability to fully participate in recreational/sports related activities.    Patient prognosis is Good.   Patient will benefit from skilled outpatient Physical Therapy to address the deficits stated above and in the chart below, provide patient /family education, and to maximize patientt's level of independence.     Plan of care discussed with patient: Yes  Patient's spiritual, cultural and educational needs considered and patient is agreeable to the plan of care and goals as stated below:     Anticipated Barriers for therapy: none    Medical Necessity is demonstrated by the following  History  Co-morbidities and personal factors that may impact the plan of care Co-morbidities:   advanced age, CAD, HTN, and pace maker    Personal Factors:   no deficits     high   Examination  Body Structures and Functions, activity limitations and participation restrictions that may impact the plan of care Body Regions:   back  lower extremities  trunk    Body Systems:    ROM  strength  gross coordinated movement  balance  gait  transfers  transitions  motor control    Participation Restrictions:   Home management  Community ambulation    Activity limitations:   Learning and applying knowledge  no deficits    General Tasks and Commands  no deficits    Communication  no  "deficits    Mobility  lifting and carrying objects  walking    Self care  no deficits    Domestic Life  doing house work (cleaning house, washing dishes, laundry)    Interactions/Relationships  no deficits    Life Areas  no deficits    Community and Social Life  no deficits         high   Clinical Presentation stable and uncomplicated low   Decision Making/ Complexity Score: low     Goals:  Short Term Goals: 4 weeks   Improve lumbar mobility by 25% for neutral standing positions.  Demonstrate 5 sit to stands independently < 30"  Improve hip/knee MMT 1/2 grade for ambulatory purposes.  Report up to 25% reduction in low back painful limitations with home management.    Long Term Goals: 8 weeks   Independent with HEP with no difficulty.  Demonstrate hip/knee MMT > 4/5 for ambulatory purposes.  Demonstrate TUG independently < 13.5" for mobility purposes.  Report up to 50% reduction in low back painful episodes with home management.  Plan     Plan of care Certification: 8/15/2023 to 10/13/2023.    Outpatient Physical Therapy 2 times weekly for 8 weeks to include the following interventions: Aquatic Therapy, Gait Training, Manual Therapy, Moist Heat/ Ice, Neuromuscular Re-ed, Patient Education, Therapeutic Activities, and Therapeutic Exercise.         "

## 2023-08-30 ENCOUNTER — CLINICAL SUPPORT (OUTPATIENT)
Dept: REHABILITATION | Facility: HOSPITAL | Age: 78
End: 2023-08-30
Payer: MEDICARE

## 2023-08-30 ENCOUNTER — OFFICE VISIT (OUTPATIENT)
Dept: PAIN MEDICINE | Facility: CLINIC | Age: 78
End: 2023-08-30
Payer: MEDICARE

## 2023-08-30 VITALS
WEIGHT: 226.31 LBS | HEART RATE: 60 BPM | DIASTOLIC BLOOD PRESSURE: 67 MMHG | BODY MASS INDEX: 33.52 KG/M2 | SYSTOLIC BLOOD PRESSURE: 112 MMHG | HEIGHT: 69 IN

## 2023-08-30 DIAGNOSIS — M54.50 CHRONIC BILATERAL LOW BACK PAIN WITHOUT SCIATICA: Primary | ICD-10-CM

## 2023-08-30 DIAGNOSIS — G89.29 CHRONIC BILATERAL LOW BACK PAIN WITHOUT SCIATICA: Primary | ICD-10-CM

## 2023-08-30 DIAGNOSIS — M54.9 DORSALGIA: ICD-10-CM

## 2023-08-30 DIAGNOSIS — M53.86 DECREASED ROM OF LUMBAR SPINE: ICD-10-CM

## 2023-08-30 DIAGNOSIS — G89.4 CHRONIC PAIN DISORDER: Primary | ICD-10-CM

## 2023-08-30 PROCEDURE — 1101F PR PT FALLS ASSESS DOC 0-1 FALLS W/OUT INJ PAST YR: ICD-10-PCS | Mod: CPTII,S$GLB,, | Performed by: ANESTHESIOLOGY

## 2023-08-30 PROCEDURE — 1160F RVW MEDS BY RX/DR IN RCRD: CPT | Mod: CPTII,S$GLB,, | Performed by: ANESTHESIOLOGY

## 2023-08-30 PROCEDURE — 99214 OFFICE O/P EST MOD 30 MIN: CPT | Mod: S$GLB,,, | Performed by: ANESTHESIOLOGY

## 2023-08-30 PROCEDURE — 97110 THERAPEUTIC EXERCISES: CPT | Mod: PO,CQ

## 2023-08-30 PROCEDURE — 1160F PR REVIEW ALL MEDS BY PRESCRIBER/CLIN PHARMACIST DOCUMENTED: ICD-10-PCS | Mod: CPTII,S$GLB,, | Performed by: ANESTHESIOLOGY

## 2023-08-30 PROCEDURE — 3074F SYST BP LT 130 MM HG: CPT | Mod: CPTII,S$GLB,, | Performed by: ANESTHESIOLOGY

## 2023-08-30 PROCEDURE — 1159F MED LIST DOCD IN RCRD: CPT | Mod: CPTII,S$GLB,, | Performed by: ANESTHESIOLOGY

## 2023-08-30 PROCEDURE — 3078F DIAST BP <80 MM HG: CPT | Mod: CPTII,S$GLB,, | Performed by: ANESTHESIOLOGY

## 2023-08-30 PROCEDURE — 3288F FALL RISK ASSESSMENT DOCD: CPT | Mod: CPTII,S$GLB,, | Performed by: ANESTHESIOLOGY

## 2023-08-30 PROCEDURE — 3074F PR MOST RECENT SYSTOLIC BLOOD PRESSURE < 130 MM HG: ICD-10-PCS | Mod: CPTII,S$GLB,, | Performed by: ANESTHESIOLOGY

## 2023-08-30 PROCEDURE — 99214 PR OFFICE/OUTPT VISIT, EST, LEVL IV, 30-39 MIN: ICD-10-PCS | Mod: S$GLB,,, | Performed by: ANESTHESIOLOGY

## 2023-08-30 PROCEDURE — 99999 PR PBB SHADOW E&M-EST. PATIENT-LVL III: CPT | Mod: PBBFAC,,, | Performed by: ANESTHESIOLOGY

## 2023-08-30 PROCEDURE — 1159F PR MEDICATION LIST DOCUMENTED IN MEDICAL RECORD: ICD-10-PCS | Mod: CPTII,S$GLB,, | Performed by: ANESTHESIOLOGY

## 2023-08-30 PROCEDURE — 99999 PR PBB SHADOW E&M-EST. PATIENT-LVL III: ICD-10-PCS | Mod: PBBFAC,,, | Performed by: ANESTHESIOLOGY

## 2023-08-30 PROCEDURE — 97530 THERAPEUTIC ACTIVITIES: CPT | Mod: PO,CQ

## 2023-08-30 PROCEDURE — 3078F PR MOST RECENT DIASTOLIC BLOOD PRESSURE < 80 MM HG: ICD-10-PCS | Mod: CPTII,S$GLB,, | Performed by: ANESTHESIOLOGY

## 2023-08-30 PROCEDURE — 1101F PT FALLS ASSESS-DOCD LE1/YR: CPT | Mod: CPTII,S$GLB,, | Performed by: ANESTHESIOLOGY

## 2023-08-30 PROCEDURE — 1125F AMNT PAIN NOTED PAIN PRSNT: CPT | Mod: CPTII,S$GLB,, | Performed by: ANESTHESIOLOGY

## 2023-08-30 PROCEDURE — 1125F PR PAIN SEVERITY QUANTIFIED, PAIN PRESENT: ICD-10-PCS | Mod: CPTII,S$GLB,, | Performed by: ANESTHESIOLOGY

## 2023-08-30 PROCEDURE — 3288F PR FALLS RISK ASSESSMENT DOCUMENTED: ICD-10-PCS | Mod: CPTII,S$GLB,, | Performed by: ANESTHESIOLOGY

## 2023-08-30 NOTE — PROGRESS NOTES
[facility] Follow Up Evaluation    Referred by: Dr. Lerner  Reason for referral: back and neck pain    CC:   Chief Complaint   Patient presents with    Follow-up    Knee Pain    Shoulder Pain    Back Pain            8/30/2023    10:14 AM 12/21/2021     8:32 AM 11/15/2021     3:25 PM   Last 3 PDI Scores   Pain Disability Index (PDI) 58 [phone]         Interval HPI 8/30/2023: Jay Han returns to the office for follow up.  Since I last saw him he has taken the Medrol Dosepak that I prescribed and he feels like he is had significant improvement in the severe and sharp pain that he had that was radiating to the right hip and right thigh.  He does still have some pain that is worse with prolonged standing however the severity of the pain has significantly improved.  He denies any numbness or weakness.    Initial HPI:   Jay Han is a 78 y.o. male who complains of back and right knee pain.  He's had this pain chronically for many years.  Has been taking hydrocodone as prescribed by his PCP.  Takes care of his disabled daughter at home.  Today his pain is 9/10, constant, sharp, aching, burning.  No numbness or weakness.  Pain can go down the right leg at times.    History:    Current Outpatient Medications:     amiodarone (PACERONE) 200 MG Tab, TAKE 1 TABLET EVERY DAY, Disp: 90 tablet, Rfl: 1    amLODIPine (NORVASC) 10 MG tablet, TAKE 1 TABLET EVERY DAY, Disp: 90 tablet, Rfl: 3    aspirin (ECOTRIN) 81 MG EC tablet, TAKE 1 TABLET EVERY DAY, Disp: 90 tablet, Rfl: 1    ELIQUIS 5 mg Tab, TAKE 1 TABLET TWICE DAILY, Disp: 180 tablet, Rfl: 1    enalapril (VASOTEC) 20 MG tablet, TAKE 2 TABLETS EVERY DAY, Disp: 180 tablet, Rfl: 3    hydroCHLOROthiazide (HYDRODIURIL) 25 MG tablet, TAKE 1 TABLET EVERY DAY, Disp: 90 tablet, Rfl: 3    HYDROcodone-acetaminophen (NORCO)  mg per tablet, Take 1 tablet by mouth every 12 (twelve) hours as needed for Pain., Disp: 60 tablet, Rfl: 0    methocarbamoL (ROBAXIN) 500 MG Tab,  "Take 500 mg by mouth 4 (four) times daily. prn, Disp: , Rfl:     metoprolol tartrate (LOPRESSOR) 25 MG tablet, TAKE 1/2 TABLET TWICE DAILY, Disp: 90 tablet, Rfl: 1    pravastatin (PRAVACHOL) 40 MG tablet, TAKE 1 TABLET EVERY DAY, Disp: 90 tablet, Rfl: 1    Past Medical History:   Diagnosis Date    Arthritis     Coronary artery disease     Dyslipidemia 1/14/2016    ED (erectile dysfunction)     Essential hypertension 1/14/2016    Essential hypertension, benign     H/O asbestos exposure     Hearing aid worn     History of chicken pox     History of wheezing     Hx of sarcoidosis     Hyperglycemia     Knee pain, chronic     right    Lipoprotein deficiencies     Lumbago     Lumbar herniated disc     Nonsustained ventricular tachycardia 1/14/2016    Other and unspecified hyperlipidemia     Other malaise and fatigue     Presence of stent in LAD coronary artery 2/11/2016    Presence of stent in left circumflex coronary artery 3/10/2016    Pure hyperglyceridemia     S/P coronary artery stent placement 3/10/2016    LAD, LCX    Shoulder pain     left shoulder    Ventricular premature complexes 1/14/2016       Past Surgical History:   Procedure Laterality Date    CARDIAC PACEMAKER PLACEMENT      CHOLECYSTECTOMY      COLONOSCOPY N/A 10/26/2021    Dr. Kaur    CORONARY STENT PLACEMENT  01/2016    INSERTION OF PACEMAKER  2018    LUNG BIOPSY      benign       Family History   Problem Relation Age of Onset    Hypertension Mother     Hypertension Maternal Grandmother     Hypertension Maternal Grandfather     Kidney disease Father     Heart disease Maternal Uncle         heart attack, smoker    Glaucoma Neg Hx        Social History     Socioeconomic History    Marital status: Single   Tobacco Use    Smoking status: Never    Smokeless tobacco: Never   Substance and Sexual Activity    Alcohol use: Yes     Alcohol/week: 1.0 standard drink of alcohol     Types: 1 Cans of beer per week     Comment: "very moderate"     Drug use: No    " Sexual activity: Not Currently     Social Determinants of Health     Financial Resource Strain: Unknown (1/24/2023)    Overall Financial Resource Strain (CARDIA)     Difficulty of Paying Living Expenses: Patient refused   Food Insecurity: Unknown (1/24/2023)    Hunger Vital Sign     Worried About Running Out of Food in the Last Year: Patient refused     Ran Out of Food in the Last Year: Patient refused   Transportation Needs: Unknown (1/24/2023)    PRAPARE - Transportation     Lack of Transportation (Medical): Patient refused     Lack of Transportation (Non-Medical): Patient refused   Physical Activity: Inactive (1/24/2023)    Exercise Vital Sign     Days of Exercise per Week: 0 days     Minutes of Exercise per Session: 0 min   Stress: No Stress Concern Present (1/24/2023)    [employer] [place] [employer] - Occupational Stress Questionnaire     Feeling of Stress : Only a little   Social Connections: Unknown (1/24/2023)    Social Connection and Isolation Panel [NHANES]     Frequency of Communication with Friends and Family: More than three times a week     Frequency of Social Gatherings with Friends and Family: More than three times a week     Active Member of Clubs or Organizations: No     Attends Club or Organization Meetings: Never     Marital Status:    Housing Stability: Unknown (1/24/2023)    Housing Stability Vital Sign     Unable to Pay for Housing in the Last Year: Patient refused     Number of Places Lived in the Last Year: 1     Unstable Housing in the Last Year: Patient refused       Review of patient's allergies indicates:  No Known Allergies    Review of Systems:  General ROS: negative for - fever  Psychological ROS: negative for - hostility  Hematological and Lymphatic ROS: negative for - bleeding problems  Endocrine ROS: negative for - unexpected weight changes  Respiratory ROS: no cough, shortness of breath, or wheezing  Cardiovascular ROS: no chest pain or dyspnea on  "exertion  Gastrointestinal ROS: no abdominal pain, change in bowel habits, or black or bloody stools  Musculoskeletal ROS: negative for - muscular weakness  Neurological ROS: negative for - numbness/tingling  Dermatological ROS: negative for rash    Physical Exam:  Vitals:    08/30/23 1013   BP: 112/67   Pulse: 60   Weight: 102.7 kg (226 lb 4.8 oz)   Height: 5' 9" (1.753 m)   PainSc:   6   PainLoc: Back       Body mass index is 33.42 kg/m².     Gen: NAD  Gait: gait intact  Psych:  Mood appropriate for given condition  HEENT: eyes anicteric   GI: Abd soft  CV: RRR  Lungs: breathing unlabored   ROM: limited AROM of the L spine in all planes, full ROM at ankles, knees and hips  Reflexes: 0/0 b/l patella and achilles  Palpation:   Tone: normal in the b/l knees and hips   Skin: intact  Extremities: No edema in b/l ankles or hands  Provacative tests: + b/l axial facet loading       Right Left   L2/3 Iliacus Hip flexion  5  5   L3/4 Qudratus Femoris Knee Extension  5  5   L4/5 Tib Anterior Ankle Dorsiflexion   5  5   L5/S1 Extensor Hallicus Longus Great toe extension  5  5                 S1/S2 Gastroc/Soleus Plantar Flexion  5  5       Imaging:  Xray lumbar spine 11/18/21  FINDINGS:  There is 4 mm anterolisthesis L2 on L3 which persists with flexion and extension position.  There is levoscoliosis centered at L3.  No pars defects.  No displaced fracture with preserved vertebral body heights.  Bulky partially bridging osteophytes at several lower thoracic levels and several lower lumbar levels most notably about L5.  Degenerative disc disease mild at L2-3 and moderate at L3-4 through L5-S1.  Facet degenerative change lowest 4 lumbar levels.  Cholecystectomy clips.  Atherosclerosis.  Partially imaged transvenous pacing wire.    Xray right knee 11/18/21  FINDINGS:  Marked medial joint space narrowing is noted bilaterally.  There is moderate tricompartmental osteophyte formation.  There is a joint effusion on the right.  I do " not see evidence of acute fracture.    X-ray lumbar spine 5/27/2022  FINDINGS:  There is a 12 degree levocurvature of the lumbar spine with associated rotatory component.  There is mild grade 1 retrolisthesis of L3 on L4.  There is loss of normal lordosis.  Alignment is otherwise within normal limits.  There is loss of disc height at L4/5, L5/S1 and to a lesser degree at L3/4.  There is associated anterior spondylosis, posterior spondylosis, sclerotic discogenic endplate changes and marked facet arthropathy.  These findings are most pronounced at L5/S1.  There is no displaced fracture or dislocation.     Vertebral body heights are preserved.  Arterial calcifications are present.     Impression:     1. Advanced multilevel disc and joint disease.    Labs:  BMP  Lab Results   Component Value Date     (L) 05/09/2023    K 3.6 05/09/2023    CL 98 05/09/2023    CO2 27 05/09/2023    BUN 14 05/09/2023    CREATININE 0.79 05/09/2023    CALCIUM 10.1 05/09/2023    ANIONGAP 10 05/09/2023    ESTGFRAFRICA >60 07/28/2022    EGFRNONAA >60 07/28/2022     Lab Results   Component Value Date    ALT 28 05/09/2023    AST 34 05/09/2023    ALKPHOS 89 05/09/2023    BILITOT 0.8 05/09/2023       Assessment:   Problem List Items Addressed This Visit    None  Visit Diagnoses       Chronic pain disorder    -  Primary    Dorsalgia                  78 y.o. year old male with PMH CAD s/p stents, a-fib, MORRIS, HTN who complains of back and right knee pain.  He's had this pain chronically for many years.  Has been taking hydrocodone as prescribed by his PCP.  Takes care of his disabled daughter at home.  Today his pain is 9/10, constant, sharp, aching, burning.  No numbness or weakness.  Pain can go down the right leg at times.    8/30/23 - Jay Menjivarer returns to the office for follow up.  Since I last saw him he has taken the Medrol Dosepak that I prescribed and he feels like he is had significant improvement in the severe and sharp pain that  he had that was radiating to the right hip and right thigh.  He does still have some pain that is worse with prolonged standing however the severity of the pain has significantly improved.  He denies any numbness or weakness.    - he still has full strength on exam  - I referred him to physical therapy last time I saw him however he has only undergone 2 sessions so far.  At this time I still think lumbar disc herniation is on the differential however he is had an improvement with the Medrol Dosepak.  I would like to give him another 4-6 weeks of formal physical therapy to see how he continues to improve.  I discussed that if he has any worsening pain he should reach out to my office so we can get a lumbar MRI.    - he continues to take hydrocodone 10/325 mg 1 to 2 times a day as needed for severe breakthrough pain.  No signs of abuse, no adverse events noted, patient experiences significant benefit of analgesia, and patient demonstrates increased activity while on these medications.  The patient is meeting the goals of opioid therapy and is dependent on them for functionality and can not perform ADLS without them. Regarding opioids, the risks were discussed including tolerance, addiction, overdose, over-sedation, drug interactions, respiratory depression, opioid-induced hyperalgesia, and even death.   UDS 7/19/23 consistent   - we will have him follow-up in the office in 4-6 weeks.        : Reviewed and consistent with medication use as prescribed.    The above note was completed, in part, with the aid of Dragon dictation software/hardware. Translation errors may be present.

## 2023-08-30 NOTE — PROGRESS NOTES
"Physical Therapy Daily Treatment Note     Name: Jay PEÑA [facility] Number: [id number]    Therapy Diagnosis:   Encounter Diagnoses   Name Primary?    Chronic bilateral low back pain without sciatica Yes    Decreased ROM of lumbar spine      Physician: Micheal Marquis MD    Visit Date: 8/30/2023    Physician Orders: PT Eval and Treat   Medical Diagnosis from Referral:   Dorsalgia   Evaluation Date: 8/15/2023  Authorization Period Expiration: 07/18/2024  Plan of Care Expiration: 10/13/2023  Progress Note Due: 9/15/2023  Visit # / Visits authorized: 2/20  FOTO: 1/3     Time In: 7:55 am  Time Out: 8:48  Total Billable Time: 53 minutes    Precautions:  Standard, blood thinners, Fall, and pacemaker     Subjective     Pt reports: "I have been feeling much better recently".  He recently received " a steroid pack" for his pain in his R knee and has noticed decreased pain. He notes that while taking care of his daughter he tends to lift too much at times which increases lower back pain.    He was not compliant with home exercise program.  Response to previous treatment: First follow up  Functional change: None yet    Pain: 2/10  Location: bilateral  R>L side low back      Objective     Objective measures noted on progress notes unless otherwise documented    Treatment      Jay received therapeutic exercises to develop strength, endurance, ROM, flexibility, posture and core stabilization for 43 minutes including:    Recumbent bike level 1 for 5 minutes  LTR x 10 5" hold ea  Piriformis stretch 3x20" ea  Hamstring stretch 3x30"  PPT x20 5" hold (VC and tactile cues to avoid valsalva maneuver)  Glute sets x20 5"  Bridges 2x10  SAQ 2# 2x10  SLR w/TA activation x10 ea  SL clamshells x15 ea      Jay participated in dynamic functional therapeutic activities to improve functional performance for 10  minutes, including:  Lifting mechanics review    Jay received cold pack for 00 minutes to lower back.      Home Exercises Provided " "and Patient Education Provided     Education provided:   - HEP review    Written Home Exercises Provided: Patient instructed to cont prior HEP.  Exercises were reviewed and Jay was able to demonstrate them prior to the end of the session.  Jay demonstrated good  understanding of the education provided.     See EMR under Patient Instructions for exercises provided prior visit.    Assessment     Jay returned for his first follow up visit reporting decreased lower back discomfort. Treatment began with HEP review with the additional lumbopelvic mobility and LE strengthening ex's, which were tolerated without any increase in pain.  Verbal and tactile cues were required to avoid valsalva maneuver and increase TA activation. Lifting mechanics handout was issued and reviewed as pt reports he has to perform some lifting at home while taking care of his daughter. Will monitor and attempt to progress per pt's tolerance.    Jay Is progressing well towards his goals.   Pt prognosis is Good.     Pt will continue to benefit from skilled outpatient physical therapy to address the deficits listed in the problem list box on initial evaluation, provide pt/family education and to maximize pt's level of independence in the home and community environment.     Pt's spiritual, cultural and educational needs considered and pt agreeable to plan of care and goals.     Anticipated barriers to physical therapy: None    Goals:   Short Term Goals: 4 weeks   Improve lumbar mobility by 25% for neutral standing positions.  Demonstrate 5 sit to stands independently < 30"  Improve hip/knee MMT 1/2 grade for ambulatory purposes.  Report up to 25% reduction in low back painful limitations with home management.     Long Term Goals: 8 weeks   Independent with HEP with no difficulty.  Demonstrate hip/knee MMT > 4/5 for ambulatory purposes.  Demonstrate TUG independently < 13.5" for mobility purposes.  Report up to 50% reduction in low back painful " episodes with home management.    Plan     Plan of care Certification: 8/15/2023 to 10/13/2023.     Outpatient Physical Therapy 2 times weekly for 8 weeks to include the following interventions: Aquatic Therapy, Gait Training, Manual Therapy, Moist Heat/ Ice, Neuromuscular Re-ed, Patient Education, Therapeutic Activities, and Therapeutic Exercise.     Judah Mendoza, PTA

## 2023-09-06 ENCOUNTER — CLINICAL SUPPORT (OUTPATIENT)
Dept: REHABILITATION | Facility: HOSPITAL | Age: 78
End: 2023-09-06
Payer: MEDICARE

## 2023-09-06 DIAGNOSIS — G89.29 CHRONIC BILATERAL LOW BACK PAIN WITHOUT SCIATICA: Primary | ICD-10-CM

## 2023-09-06 DIAGNOSIS — M54.50 CHRONIC BILATERAL LOW BACK PAIN WITHOUT SCIATICA: Primary | ICD-10-CM

## 2023-09-06 DIAGNOSIS — M53.86 DECREASED ROM OF LUMBAR SPINE: ICD-10-CM

## 2023-09-06 PROCEDURE — 97110 THERAPEUTIC EXERCISES: CPT | Mod: PO,CQ

## 2023-09-06 NOTE — PROGRESS NOTES
"Physical Therapy Daily Treatment Note     Name: Jay PEÑA [facility] Number: [id number]    Therapy Diagnosis:   Encounter Diagnoses   Name Primary?    Chronic bilateral low back pain without sciatica Yes    Decreased ROM of lumbar spine      Physician: Micheal Marquis MD    Visit Date: 9/6/2023    Physician Orders: PT Eval and Treat   Medical Diagnosis from Referral:   Dorsalgia   Evaluation Date: 8/15/2023  Authorization Period Expiration: 07/18/2024  Plan of Care Expiration: 10/13/2023  Progress Note Due: 9/15/2023  Visit # / Visits authorized: 3/20  FOTO: 1/3     Time In: 8:00 am  Time Out: 8:50 am  Total Billable Time: 50 minutes 25 minutes 1:1    Precautions:  Standard, blood thinners, Fall, and pacemaker     Subjective     Pt reports: "I think I can already feel a difference from the exercises ". He reports only minimal R sided lower back discomfort currently.    He was somewhat compliant with home exercise program.  Response to previous treatment: min increase in muscular soreness  Functional change: None yet    Pain: 1/10  Location: bilateral  R>L side low back      Objective     Objective measures noted on progress notes unless otherwise documented    Treatment      Jay received therapeutic exercises to develop strength, endurance, ROM, flexibility, posture and core stabilization for 50 minutes including:    Recumbent bike level 1 for 5 minutes  LTR x 10 5" hold ea  Piriformis stretch 3x20" ea  Hamstring stretch 3x30"  PPT x20 5" hold (VC and tactile cues to avoid valsalva maneuver)  Glute sets x20 5"  Bridges RTB 3x10  SAQ 2# 2x10  SLR w/TA activation x10 ea  SL RTB clamshells x20 ea      Jay participated in dynamic functional therapeutic activities to improve functional performance for 00  minutes, including:  Lifting mechanics review    Jay received cold pack for 00 minutes to lower back.      Home Exercises Provided and Patient Education Provided     Education provided:   - HEP review    Written " "Home Exercises Provided: Patient instructed to cont prior HEP.  Exercises were reviewed and Jay was able to demonstrate them prior to the end of the session.  Jay demonstrated good  understanding of the education provided.     See EMR under Patient Instructions for exercises provided prior visit.    Assessment     Jay returned reporting min lower back discomfort and experiencing min increase in muscular soreness following first follow up visit.  Treatment continued with previous exercises, progressing by adding resistance to bridges and clamshells for added challenge to glute med musculature. Mod verbal and tactile cues still required to increase TA activation and decreased LE activation during PPT. He tolerated exercise progressions well with no increase in pain. Will look to progress per pt's tolerance.    Jay Is progressing well towards his goals.   Pt prognosis is Good.     Pt will continue to benefit from skilled outpatient physical therapy to address the deficits listed in the problem list box on initial evaluation, provide pt/family education and to maximize pt's level of independence in the home and community environment.     Pt's spiritual, cultural and educational needs considered and pt agreeable to plan of care and goals.     Anticipated barriers to physical therapy: None    Goals:   Short Term Goals: 4 weeks   Improve lumbar mobility by 25% for neutral standing positions.  Demonstrate 5 sit to stands independently < 30"  Improve hip/knee MMT 1/2 grade for ambulatory purposes.  Report up to 25% reduction in low back painful limitations with home management.     Long Term Goals: 8 weeks   Independent with HEP with no difficulty.  Demonstrate hip/knee MMT > 4/5 for ambulatory purposes.  Demonstrate TUG independently < 13.5" for mobility purposes.  Report up to 50% reduction in low back painful episodes with home management.    Plan     Plan of care Certification: 8/15/2023 to 10/13/2023.     Outpatient " Physical Therapy 2 times weekly for 8 weeks to include the following interventions: Aquatic Therapy, Gait Training, Manual Therapy, Moist Heat/ Ice, Neuromuscular Re-ed, Patient Education, Therapeutic Activities, and Therapeutic Exercise.     Judah Mendoza, PTA

## 2023-09-14 DIAGNOSIS — Z02.89 PAIN MANAGEMENT CONTRACT SIGNED: ICD-10-CM

## 2023-09-14 DIAGNOSIS — G89.29 CHRONIC BILATERAL LOW BACK PAIN WITHOUT SCIATICA: ICD-10-CM

## 2023-09-14 DIAGNOSIS — M54.50 CHRONIC BILATERAL LOW BACK PAIN WITHOUT SCIATICA: ICD-10-CM

## 2023-09-15 RX ORDER — HYDROCODONE BITARTRATE AND ACETAMINOPHEN 10; 325 MG/1; MG/1
1 TABLET ORAL EVERY 12 HOURS PRN
Qty: 60 TABLET | Refills: 0 | Status: SHIPPED | OUTPATIENT
Start: 2023-09-15 | End: 2023-09-20 | Stop reason: SDUPTHER

## 2023-09-15 NOTE — TELEPHONE ENCOUNTER
Requested medication - HYDROcodone-acetaminophen (NORCO)  mg per tablet    shows last refill - 8/17/2023  Last office visit - 8/30/2023  Next office visit - 10/4/2023  Patient DOES have a UDS on file under pain management (7/19/2023) detected HYDROCODONE, NORHYDROCODONE, HYDROMORPHONE  Patient DOES have a pain contract on file (1/11/2022)

## 2023-09-20 DIAGNOSIS — Z02.89 PAIN MANAGEMENT CONTRACT SIGNED: ICD-10-CM

## 2023-09-20 DIAGNOSIS — M54.50 CHRONIC BILATERAL LOW BACK PAIN WITHOUT SCIATICA: ICD-10-CM

## 2023-09-20 DIAGNOSIS — G89.29 CHRONIC BILATERAL LOW BACK PAIN WITHOUT SCIATICA: ICD-10-CM

## 2023-09-21 RX ORDER — HYDROCODONE BITARTRATE AND ACETAMINOPHEN 10; 325 MG/1; MG/1
1 TABLET ORAL EVERY 12 HOURS PRN
Qty: 60 TABLET | Refills: 0 | Status: SHIPPED | OUTPATIENT
Start: 2023-09-21 | End: 2023-10-19 | Stop reason: SDUPTHER

## 2023-10-04 ENCOUNTER — OFFICE VISIT (OUTPATIENT)
Dept: PAIN MEDICINE | Facility: CLINIC | Age: 78
End: 2023-10-04
Payer: MEDICARE

## 2023-10-04 VITALS
DIASTOLIC BLOOD PRESSURE: 72 MMHG | WEIGHT: 217.5 LBS | BODY MASS INDEX: 32.22 KG/M2 | HEART RATE: 60 BPM | HEIGHT: 69 IN | TEMPERATURE: 97 F | OXYGEN SATURATION: 93 % | SYSTOLIC BLOOD PRESSURE: 140 MMHG

## 2023-10-04 DIAGNOSIS — G89.29 CHRONIC BILATERAL LOW BACK PAIN WITHOUT SCIATICA: ICD-10-CM

## 2023-10-04 DIAGNOSIS — M47.816 LUMBAR SPONDYLOSIS: ICD-10-CM

## 2023-10-04 DIAGNOSIS — Z02.89 PAIN MANAGEMENT CONTRACT SIGNED: ICD-10-CM

## 2023-10-04 DIAGNOSIS — M25.561 CHRONIC PAIN OF RIGHT KNEE: ICD-10-CM

## 2023-10-04 DIAGNOSIS — G89.4 CHRONIC PAIN DISORDER: Primary | ICD-10-CM

## 2023-10-04 DIAGNOSIS — M54.9 DORSALGIA: ICD-10-CM

## 2023-10-04 DIAGNOSIS — M54.50 CHRONIC BILATERAL LOW BACK PAIN WITHOUT SCIATICA: ICD-10-CM

## 2023-10-04 DIAGNOSIS — G89.29 CHRONIC PAIN OF RIGHT KNEE: ICD-10-CM

## 2023-10-04 PROCEDURE — 1125F PR PAIN SEVERITY QUANTIFIED, PAIN PRESENT: ICD-10-PCS | Mod: CPTII,S$GLB,,

## 2023-10-04 PROCEDURE — 1159F MED LIST DOCD IN RCRD: CPT | Mod: CPTII,S$GLB,,

## 2023-10-04 PROCEDURE — 3288F PR FALLS RISK ASSESSMENT DOCUMENTED: ICD-10-PCS | Mod: CPTII,S$GLB,,

## 2023-10-04 PROCEDURE — 1101F PT FALLS ASSESS-DOCD LE1/YR: CPT | Mod: CPTII,S$GLB,,

## 2023-10-04 PROCEDURE — 1101F PR PT FALLS ASSESS DOC 0-1 FALLS W/OUT INJ PAST YR: ICD-10-PCS | Mod: CPTII,S$GLB,,

## 2023-10-04 PROCEDURE — 99999 PR PBB SHADOW E&M-EST. PATIENT-LVL III: ICD-10-PCS | Mod: PBBFAC,,,

## 2023-10-04 PROCEDURE — 3288F FALL RISK ASSESSMENT DOCD: CPT | Mod: CPTII,S$GLB,,

## 2023-10-04 PROCEDURE — 99214 OFFICE O/P EST MOD 30 MIN: CPT | Mod: S$GLB,,,

## 2023-10-04 PROCEDURE — 1159F PR MEDICATION LIST DOCUMENTED IN MEDICAL RECORD: ICD-10-PCS | Mod: CPTII,S$GLB,,

## 2023-10-04 PROCEDURE — 99999 PR PBB SHADOW E&M-EST. PATIENT-LVL III: CPT | Mod: PBBFAC,,,

## 2023-10-04 PROCEDURE — 3077F PR MOST RECENT SYSTOLIC BLOOD PRESSURE >= 140 MM HG: ICD-10-PCS | Mod: CPTII,S$GLB,,

## 2023-10-04 PROCEDURE — 3078F DIAST BP <80 MM HG: CPT | Mod: CPTII,S$GLB,,

## 2023-10-04 PROCEDURE — 3077F SYST BP >= 140 MM HG: CPT | Mod: CPTII,S$GLB,,

## 2023-10-04 PROCEDURE — 3078F PR MOST RECENT DIASTOLIC BLOOD PRESSURE < 80 MM HG: ICD-10-PCS | Mod: CPTII,S$GLB,,

## 2023-10-04 PROCEDURE — 99214 PR OFFICE/OUTPT VISIT, EST, LEVL IV, 30-39 MIN: ICD-10-PCS | Mod: S$GLB,,,

## 2023-10-04 PROCEDURE — 1125F AMNT PAIN NOTED PAIN PRSNT: CPT | Mod: CPTII,S$GLB,,

## 2023-10-04 RX ORDER — HYDROCODONE BITARTRATE AND ACETAMINOPHEN 10; 325 MG/1; MG/1
1 TABLET ORAL EVERY 12 HOURS PRN
Qty: 60 TABLET | Refills: 0 | OUTPATIENT
Start: 2023-10-21 | End: 2023-11-20

## 2023-10-04 NOTE — PROGRESS NOTES
[facility] Follow Up Evaluation    Referred by: Dr. Lerner  Reason for referral: back and neck pain    CC:   Chief Complaint   Patient presents with    Low-back Pain    Knee Pain    Shoulder Pain    Hand Pain    Back Pain            10/4/2023     9:29 AM 8/30/2023    10:14 AM 12/21/2021     8:32 AM   Last 3 PDI Scores   Pain Disability Index (PDI) 48 58 6     Interval HPI 10/4/2023: Jay Han returns to the office for follow up.  He returns with continued right knee pain and generalized pain throughout his lower back and left shoulder pain.  Overall, he is at his baseline for pain with no significantly worsening symptoms.  He denies any new numbness, weakness or new changes to his bowel or bladder function.  He continues to take hydrocodone 10/325mg twice daily with at relief.  He denies any ill side effects or adverse events with his medication.      Initial HPI:   Jay Han is a 78 y.o. male who complains of back and right knee pain.  He's had this pain chronically for many years.  Has been taking hydrocodone as prescribed by his PCP.  Takes care of his disabled daughter at home.  Today his pain is 9/10, constant, sharp, aching, burning.  No numbness or weakness.  Pain can go down the right leg at times.    History:    Current Outpatient Medications:     amiodarone (PACERONE) 200 MG Tab, TAKE 1 TABLET EVERY DAY, Disp: 90 tablet, Rfl: 1    amLODIPine (NORVASC) 10 MG tablet, TAKE 1 TABLET EVERY DAY, Disp: 90 tablet, Rfl: 3    aspirin (ECOTRIN) 81 MG EC tablet, TAKE 1 TABLET EVERY DAY, Disp: 90 tablet, Rfl: 1    ELIQUIS 5 mg Tab, TAKE 1 TABLET TWICE DAILY, Disp: 180 tablet, Rfl: 1    enalapril (VASOTEC) 20 MG tablet, TAKE 2 TABLETS EVERY DAY, Disp: 180 tablet, Rfl: 3    hydroCHLOROthiazide (HYDRODIURIL) 25 MG tablet, TAKE 1 TABLET EVERY DAY, Disp: 90 tablet, Rfl: 3    HYDROcodone-acetaminophen (NORCO)  mg per tablet, Take 1 tablet by mouth every 12 (twelve) hours as needed for Pain., Disp:  "60 tablet, Rfl: 0    metoprolol tartrate (LOPRESSOR) 25 MG tablet, TAKE 1/2 TABLET TWICE DAILY, Disp: 90 tablet, Rfl: 3    pravastatin (PRAVACHOL) 40 MG tablet, TAKE 1 TABLET EVERY DAY, Disp: 90 tablet, Rfl: 1    Past Medical History:   Diagnosis Date    Arthritis     Coronary artery disease     Dyslipidemia 1/14/2016    ED (erectile dysfunction)     Essential hypertension 1/14/2016    Essential hypertension, benign     H/O asbestos exposure     Hearing aid worn     History of chicken pox     History of wheezing     Hx of sarcoidosis     Hyperglycemia     Knee pain, chronic     right    Lipoprotein deficiencies     Lumbago     Lumbar herniated disc     Nonsustained ventricular tachycardia 1/14/2016    Other and unspecified hyperlipidemia     Other malaise and fatigue     Presence of stent in LAD coronary artery 2/11/2016    Presence of stent in left circumflex coronary artery 3/10/2016    Pure hyperglyceridemia     S/P coronary artery stent placement 3/10/2016    LAD, LCX    Shoulder pain     left shoulder    Ventricular premature complexes 1/14/2016       Past Surgical History:   Procedure Laterality Date    CARDIAC PACEMAKER PLACEMENT      CHOLECYSTECTOMY      COLONOSCOPY N/A 10/26/2021    Dr. Kaur    CORONARY STENT PLACEMENT  01/2016    INSERTION OF PACEMAKER  2018    LUNG BIOPSY      benign       Family History   Problem Relation Age of Onset    Hypertension Mother     Hypertension Maternal Grandmother     Hypertension Maternal Grandfather     Kidney disease Father     Heart disease Maternal Uncle         heart attack, smoker    Glaucoma Neg Hx        Social History     Socioeconomic History    Marital status: Single   Tobacco Use    Smoking status: Never    Smokeless tobacco: Never   Substance and Sexual Activity    Alcohol use: Yes     Alcohol/week: 1.0 standard drink of alcohol     Types: 1 Cans of beer per week     Comment: "very moderate"     Drug use: No    Sexual activity: Not Currently     Social " Determinants of Health     Financial Resource Strain: High Risk (9/2/2023)    Overall Financial Resource Strain (CARDIA)     Difficulty of Paying Living Expenses: Hard   Food Insecurity: Food Insecurity Present (9/2/2023)    Hunger Vital Sign     Worried About Running Out of Food in the Last Year: Sometimes true     Ran Out of Food in the Last Year: Sometimes true   Transportation Needs: No Transportation Needs (9/2/2023)    PRAPARE - Transportation     Lack of Transportation (Medical): No     Lack of Transportation (Non-Medical): No   Physical Activity: Insufficiently Active (9/2/2023)    Exercise Vital Sign     Days of Exercise per Week: 1 day     Minutes of Exercise per Session: 40 min   Stress: No Stress Concern Present (9/2/2023)    Citizen of [place] [employer] - Occupational Stress Questionnaire     Feeling of Stress : Only a little   Social Connections: Unknown (9/2/2023)    Social Connection and Isolation Panel [NHANES]     Frequency of Communication with Friends and Family: More than three times a week     Frequency of Social Gatherings with Friends and Family: Twice a week     Active Member of Clubs or Organizations: No     Attends Club or Organization Meetings: Never     Marital Status:    Housing Stability: Unknown (9/2/2023)    Housing Stability Vital Sign     Unable to Pay for Housing in the Last Year: Patient refused     Number of Places Lived in the Last Year: 1     Unstable Housing in the Last Year: No       Review of patient's allergies indicates:  No Known Allergies    Review of Systems:  General ROS: negative for - fever  Psychological ROS: negative for - hostility  Hematological and Lymphatic ROS: negative for - bleeding problems  Endocrine ROS: negative for - unexpected weight changes  Respiratory ROS: no cough, shortness of breath, or wheezing  Cardiovascular ROS: no chest pain or dyspnea on exertion  Gastrointestinal ROS: no abdominal pain, change in bowel habits, or black  "or bloody stools  Musculoskeletal ROS: negative for - muscular weakness  Neurological ROS: negative for - numbness/tingling  Dermatological ROS: negative for rash    Physical Exam:  Vitals:    10/04/23 0930   BP: (!) 140/72   Pulse: 60   Temp: 96.9 °F (36.1 °C)   SpO2: (!) 93%   Weight: 98.6 kg (217 lb 7.7 oz)   Height: 5' 9" (1.753 m)   PainSc:   8   PainLoc: Generalized       Body mass index is 32.12 kg/m².     Gen: NAD  Gait:  Ambulates with cane  Psych:  Mood appropriate for given condition  HEENT: eyes anicteric   GI: Abd soft  CV: RRR  Lungs: breathing unlabored   ROM: limited AROM of the L spine in all planes, full ROM at ankles, knees and hips    Imaging:  Xray lumbar spine 11/18/21  FINDINGS:  There is 4 mm anterolisthesis L2 on L3 which persists with flexion and extension position.  There is levoscoliosis centered at L3.  No pars defects.  No displaced fracture with preserved vertebral body heights.  Bulky partially bridging osteophytes at several lower thoracic levels and several lower lumbar levels most notably about L5.  Degenerative disc disease mild at L2-3 and moderate at L3-4 through L5-S1.  Facet degenerative change lowest 4 lumbar levels.  Cholecystectomy clips.  Atherosclerosis.  Partially imaged transvenous pacing wire.    Xray right knee 11/18/21  FINDINGS:  Marked medial joint space narrowing is noted bilaterally.  There is moderate tricompartmental osteophyte formation.  There is a joint effusion on the right.  I do not see evidence of acute fracture.    X-ray lumbar spine 5/27/2022  FINDINGS:  There is a 12 degree levocurvature of the lumbar spine with associated rotatory component.  There is mild grade 1 retrolisthesis of L3 on L4.  There is loss of normal lordosis.  Alignment is otherwise within normal limits.  There is loss of disc height at L4/5, L5/S1 and to a lesser degree at L3/4.  There is associated anterior spondylosis, posterior spondylosis, sclerotic discogenic endplate changes " and marked facet arthropathy.  These findings are most pronounced at L5/S1.  There is no displaced fracture or dislocation.     Vertebral body heights are preserved.  Arterial calcifications are present.     Impression:     1. Advanced multilevel disc and joint disease.    Labs:  BMP  Lab Results   Component Value Date     (L) 05/09/2023    K 3.6 05/09/2023    CL 98 05/09/2023    CO2 27 05/09/2023    BUN 14 05/09/2023    CREATININE 0.79 05/09/2023    CALCIUM 10.1 05/09/2023    ANIONGAP 10 05/09/2023    ESTGFRAFRICA >60 07/28/2022    EGFRNONAA >60 07/28/2022     Lab Results   Component Value Date    ALT 28 05/09/2023    AST 34 05/09/2023    ALKPHOS 89 05/09/2023    BILITOT 0.8 05/09/2023       Assessment:   Problem List Items Addressed This Visit          Orthopedic    Chronic knee pain    Chronic bilateral low back pain without sciatica     Other Visit Diagnoses       Chronic pain disorder    -  Primary    Dorsalgia        Pain management contract signed        Lumbar spondylosis                    78 y.o. year old male with PMH CAD s/p stents, a-fib, MORRIS, HTN who complains of back and right knee pain.  He's had this pain chronically for many years.  Has been taking hydrocodone as prescribed by his PCP.  Takes care of his disabled daughter at home.  Today his pain is 9/10, constant, sharp, aching, burning.  No numbness or weakness.  Pain can go down the right leg at times.    10/4/2023: Jay Han returns to the office for follow up.  He returns with continued right knee pain and generalized pain throughout his lower back and left shoulder pain.  Overall, he is at his baseline for pain with no significantly worsening symptoms.  He denies any new numbness, weakness or new changes to his bowel or bladder function.  He continues to take hydrocodone 10/325mg twice daily with at relief.  He denies any ill side effects or adverse events with his medication.    - he continues to attend formal physical therapy  intermittently with added relief.  Overall he is at his baseline for pain with no significant worsening pain  - at this time will continue to maximize conservative therapy with rest, formal PT and medications.    - we will continue with hydrocodone 10/325 mg 1 to 2 times a day as needed for severe breakthrough pain.  No signs of abuse, no adverse events noted, patient experiences significant benefit of analgesia, and patient demonstrates increased activity while on these medications.  The patient is meeting the goals of opioid therapy and is dependent on them for functionality and can not perform ADLS without them. Regarding opioids, the risks were discussed including tolerance, addiction, overdose, over-sedation, drug interactions, respiratory depression, opioid-induced hyperalgesia, and even death.   UDS 7/19/23 consistent   - refill for hydrocodone 10/325 mg 60 tablets sent to Dr. Marquis today for approval.  - follow up in 3 months or sooner if needed.      : Reviewed and consistent with medication use as prescribed.    The above note was completed, in part, with the aid of Dragon dictation software/hardware. Translation errors may be present.

## 2023-10-05 NOTE — TELEPHONE ENCOUNTER
----- Message from Alejandra Rice sent at 10/5/2023  3:00 PM CDT -----  Contact: pt  Type:  Needs Medical Advice    Who Called: pt    Would the patient rather a call back or a response via [facility]? Call back  Best Call Back Number: [phone]    Additional Information: pt needs a referral for ortho (for hand) put into the system please  Thanks

## 2023-10-19 DIAGNOSIS — Z02.89 PAIN MANAGEMENT CONTRACT SIGNED: ICD-10-CM

## 2023-10-19 DIAGNOSIS — G89.29 CHRONIC BILATERAL LOW BACK PAIN WITHOUT SCIATICA: ICD-10-CM

## 2023-10-19 DIAGNOSIS — M54.50 CHRONIC BILATERAL LOW BACK PAIN WITHOUT SCIATICA: ICD-10-CM

## 2023-10-19 NOTE — TELEPHONE ENCOUNTER
Requested medication - HYDROcodone-acetaminophen (NORCO)  mg per tablet   shows last refill - 9/21/2023  Last office visit - 10/4/2023  Next office visit - 1/3/2024  Patient DOES have a UDS on file under pain management (7/19/2023) detected HYDROCODONE, NORHYDROCODONE, HYDROMORPHONE  Patient DOES have a pain contract on file (1/11/2022)

## 2023-10-22 ENCOUNTER — PATIENT MESSAGE (OUTPATIENT)
Dept: PAIN MEDICINE | Facility: CLINIC | Age: 78
End: 2023-10-22
Payer: MEDICARE

## 2023-10-23 RX ORDER — HYDROCODONE BITARTRATE AND ACETAMINOPHEN 10; 325 MG/1; MG/1
1 TABLET ORAL EVERY 12 HOURS PRN
Qty: 60 TABLET | Refills: 0 | Status: SHIPPED | OUTPATIENT
Start: 2023-10-23 | End: 2023-10-24 | Stop reason: SDUPTHER

## 2023-10-24 ENCOUNTER — TELEPHONE (OUTPATIENT)
Dept: PAIN MEDICINE | Facility: CLINIC | Age: 78
End: 2023-10-24

## 2023-10-24 DIAGNOSIS — Z02.89 PAIN MANAGEMENT CONTRACT SIGNED: ICD-10-CM

## 2023-10-24 DIAGNOSIS — M54.50 CHRONIC BILATERAL LOW BACK PAIN WITHOUT SCIATICA: ICD-10-CM

## 2023-10-24 DIAGNOSIS — G89.29 CHRONIC BILATERAL LOW BACK PAIN WITHOUT SCIATICA: ICD-10-CM

## 2023-10-24 RX ORDER — HYDROCODONE BITARTRATE AND ACETAMINOPHEN 10; 325 MG/1; MG/1
1 TABLET ORAL EVERY 12 HOURS PRN
Qty: 60 TABLET | Refills: 0 | Status: SHIPPED | OUTPATIENT
Start: 2023-10-24 | End: 2023-11-21 | Stop reason: SDUPTHER

## 2023-11-09 PROBLEM — G89.29 CHRONIC LEFT SHOULDER PAIN: Status: ACTIVE | Noted: 2023-11-09

## 2023-11-09 PROBLEM — M25.512 CHRONIC LEFT SHOULDER PAIN: Status: ACTIVE | Noted: 2023-11-09

## 2023-12-21 DIAGNOSIS — M54.50 CHRONIC BILATERAL LOW BACK PAIN WITHOUT SCIATICA: ICD-10-CM

## 2023-12-21 DIAGNOSIS — Z02.89 PAIN MANAGEMENT CONTRACT SIGNED: ICD-10-CM

## 2023-12-21 DIAGNOSIS — G89.29 CHRONIC BILATERAL LOW BACK PAIN WITHOUT SCIATICA: ICD-10-CM

## 2023-12-21 RX ORDER — HYDROCODONE BITARTRATE AND ACETAMINOPHEN 10; 325 MG/1; MG/1
1 TABLET ORAL EVERY 12 HOURS PRN
Qty: 60 TABLET | Refills: 0 | Status: CANCELLED | OUTPATIENT
Start: 2023-12-21 | End: 2024-01-20

## 2023-12-29 ENCOUNTER — TELEPHONE (OUTPATIENT)
Dept: PAIN MEDICINE | Facility: CLINIC | Age: 78
End: 2023-12-29
Payer: MEDICARE

## 2023-12-29 NOTE — TELEPHONE ENCOUNTER
----- Message from Kannan Wilde sent at 12/29/2023  2:30 PM CST -----  Type: Needs Medical Advice    Who Called:  Pt    [facility] [id number] [address]  [address] [address]  [facility] [address]  Phone: [phone] Fax: [phone]    Best Call Back Number: [phone]      Additional Information: Pt is calling to return phone call Please call back to advise, Thanks!

## 2023-12-29 NOTE — TELEPHONE ENCOUNTER
----- Message from Marisela Berumen sent at 12/27/2023 10:15 AM CST -----  Regarding: Advice  Contact: Patient  Type: Needs Medical Advice  Who Called:  Patient  Symptoms (please be specific):    How long has patient had these symptoms:    Pharmacy name and phone #:    [facility] [address] [address]  [address] [address]  [facility] [address]  Phone: [phone] Fax: [phone]    Best Call Back Number: [phone] (home)     Additional Information: Patient would like a call regarding his HYDROcodone-acetaminophen (NORCO)  mg per tablet   and why it is on review. He has been out of medication for 3-4 days and needs a refill

## 2024-01-03 ENCOUNTER — OFFICE VISIT (OUTPATIENT)
Dept: PAIN MEDICINE | Facility: CLINIC | Age: 79
End: 2024-01-03
Payer: MEDICARE

## 2024-01-03 VITALS
DIASTOLIC BLOOD PRESSURE: 62 MMHG | SYSTOLIC BLOOD PRESSURE: 113 MMHG | WEIGHT: 214.19 LBS | HEIGHT: 69 IN | HEART RATE: 64 BPM | BODY MASS INDEX: 31.72 KG/M2

## 2024-01-03 DIAGNOSIS — G89.4 CHRONIC PAIN DISORDER: ICD-10-CM

## 2024-01-03 DIAGNOSIS — G89.29 CHRONIC BILATERAL LOW BACK PAIN WITHOUT SCIATICA: ICD-10-CM

## 2024-01-03 DIAGNOSIS — Z02.89 PAIN MANAGEMENT CONTRACT SIGNED: ICD-10-CM

## 2024-01-03 DIAGNOSIS — M54.50 CHRONIC BILATERAL LOW BACK PAIN WITHOUT SCIATICA: ICD-10-CM

## 2024-01-03 DIAGNOSIS — M54.9 DORSALGIA: ICD-10-CM

## 2024-01-03 DIAGNOSIS — G89.29 CHRONIC PAIN OF RIGHT KNEE: Primary | ICD-10-CM

## 2024-01-03 DIAGNOSIS — M17.11 OSTEOARTHRITIS OF RIGHT KNEE, UNSPECIFIED OSTEOARTHRITIS TYPE: ICD-10-CM

## 2024-01-03 DIAGNOSIS — M47.816 LUMBAR SPONDYLOSIS: ICD-10-CM

## 2024-01-03 DIAGNOSIS — M25.561 CHRONIC PAIN OF RIGHT KNEE: Primary | ICD-10-CM

## 2024-01-03 PROCEDURE — 3288F FALL RISK ASSESSMENT DOCD: CPT | Mod: CPTII,S$GLB,,

## 2024-01-03 PROCEDURE — 99999 PR PBB SHADOW E&M-EST. PATIENT-LVL IV: CPT | Mod: PBBFAC,,,

## 2024-01-03 PROCEDURE — 1125F AMNT PAIN NOTED PAIN PRSNT: CPT | Mod: CPTII,S$GLB,,

## 2024-01-03 PROCEDURE — 99214 OFFICE O/P EST MOD 30 MIN: CPT | Mod: S$GLB,,,

## 2024-01-03 PROCEDURE — 1159F MED LIST DOCD IN RCRD: CPT | Mod: CPTII,S$GLB,,

## 2024-01-03 PROCEDURE — 1100F PTFALLS ASSESS-DOCD GE2>/YR: CPT | Mod: CPTII,S$GLB,,

## 2024-01-03 PROCEDURE — 3078F DIAST BP <80 MM HG: CPT | Mod: CPTII,S$GLB,,

## 2024-01-03 PROCEDURE — 3074F SYST BP LT 130 MM HG: CPT | Mod: CPTII,S$GLB,,

## 2024-01-03 RX ORDER — HYDROCODONE BITARTRATE AND ACETAMINOPHEN 10; 325 MG/1; MG/1
1 TABLET ORAL EVERY 12 HOURS PRN
Qty: 60 TABLET | Refills: 0 | Status: SHIPPED | OUTPATIENT
Start: 2024-01-25 | End: 2024-02-26 | Stop reason: SDUPTHER

## 2024-01-03 NOTE — PROGRESS NOTES
[facility] Follow Up Evaluation    Referred by: Dr. Lerner  Reason for referral: back and neck pain    CC:   Chief Complaint   Patient presents with    Low-back Pain            1/3/2024     8:54 AM 10/4/2023     9:29 AM 8/30/2023    10:14 AM   Last 3 PDI Scores   Pain Disability Index (PDI) 34 [phone] 58     Interval HPI 1/3/2024: Jay Han returns to the office for follow up.  He returns for follow-up with continued right knee pain, lower back pain and left shoulder pain.  He continues to be at his baseline with no new or significantly worsening pain.  He does report a fall roughly 4 weeks ago onto his right hip.  He had some lingering pain after the fall however that is improved with no significant remaining pain.  He denies any new numbness, weakness or any new changes to his bowel bladder function.  He continues to take hydrocodone 10/325 mg twice daily.  He denies any ill side effects or adverse events with his medication.  He does report some worsening dizziness they feels like contributed to his fall.  He has a substantial intentional 40-60 lb weight loss.       Initial HPI:   Jay Han is a 78 y.o. male who complains of back and right knee pain.  He's had this pain chronically for many years.  Has been taking hydrocodone as prescribed by his PCP.  Takes care of his disabled daughter at home.  Today his pain is 9/10, constant, sharp, aching, burning.  No numbness or weakness.  Pain can go down the right leg at times.    History:    Current Outpatient Medications:     amiodarone (PACERONE) 200 MG Tab, TAKE 1 TABLET EVERY DAY, Disp: 90 tablet, Rfl: 1    amLODIPine (NORVASC) 10 MG tablet, TAKE 1 TABLET EVERY DAY, Disp: 90 tablet, Rfl: 3    ascorbic acid, vitamin C, (VITAMIN C) 1000 MG tablet, Take 1,000 mg by mouth once daily., Disp: , Rfl:     CALCIUM ORAL, Take 1,200 mg by mouth once daily., Disp: , Rfl:     cholecalciferol, vitamin D3, (VITAMIN D3 ORAL), Take 125 mcg by mouth once daily.,  Disp: , Rfl:     ELIQUIS 5 mg Tab, TAKE 1 TABLET TWICE DAILY, Disp: 180 tablet, Rfl: 1    enalapril (VASOTEC) 20 MG tablet, TAKE 2 TABLETS EVERY DAY, Disp: 180 tablet, Rfl: 3    hydroCHLOROthiazide (HYDRODIURIL) 25 MG tablet, TAKE 1 TABLET EVERY DAY, Disp: 90 tablet, Rfl: 3    HYDROcodone-acetaminophen (NORCO)  mg per tablet, Take 1 tablet by mouth every 12 (twelve) hours as needed for Pain., Disp: 60 tablet, Rfl: 0    MAGNESIUM ORAL, Take 500 mg by mouth Daily., Disp: , Rfl:     MEN'S MULTI-VITAMIN ORAL, Take 1 tablet by mouth once daily., Disp: , Rfl:     metoprolol tartrate (LOPRESSOR) 25 MG tablet, TAKE 1/2 TABLET TWICE DAILY, Disp: 90 tablet, Rfl: 3    oseltamivir (TAMIFLU) 75 MG capsule, Take 1 capsule (75 mg total) by mouth 2 (two) times daily. for 5 days, Disp: 10 capsule, Rfl: 0    pravastatin (PRAVACHOL) 40 MG tablet, TAKE 1 TABLET EVERY DAY, Disp: 90 tablet, Rfl: 1    SELENIUM ORAL, Take 200 mg by mouth once daily., Disp: , Rfl:     UNABLE TO FIND, Take by mouth once daily. medication name: D-ASPARTIC ACID 3000mg, Disp: , Rfl:     Past Medical History:   Diagnosis Date    Arthritis     Coronary artery disease     Dyslipidemia 1/14/2016    ED (erectile dysfunction)     Essential hypertension 1/14/2016    Essential hypertension, benign     H/O asbestos exposure     Hearing aid worn     History of chicken pox     History of wheezing     Hx of sarcoidosis     Hyperglycemia     Knee pain, chronic     right    Lipoprotein deficiencies     Lumbago     Lumbar herniated disc     Nonsustained ventricular tachycardia 1/14/2016    Other and unspecified hyperlipidemia     Other malaise and fatigue     Presence of stent in LAD coronary artery 2/11/2016    Presence of stent in left circumflex coronary artery 3/10/2016    Pure hyperglyceridemia     S/P coronary artery stent placement 3/10/2016    LAD, LCX    Shoulder pain     left shoulder    Ventricular premature complexes 1/14/2016       Past Surgical History:  "  Procedure Laterality Date    CARDIAC PACEMAKER PLACEMENT      CHOLECYSTECTOMY      COLONOSCOPY N/A 10/26/2021    Dr. Kaur    CORONARY STENT PLACEMENT  01/2016    INSERTION OF PACEMAKER  2018    LUNG BIOPSY      benign       Family History   Problem Relation Age of Onset    Hypertension Mother     Hypertension Maternal Grandmother     Hypertension Maternal Grandfather     Kidney disease Father     Heart disease Maternal Uncle         heart attack, smoker    Glaucoma Neg Hx        Social History     Socioeconomic History    Marital status: Single   Tobacco Use    Smoking status: Never    Smokeless tobacco: Never   Substance and Sexual Activity    Alcohol use: Yes     Alcohol/week: 1.0 standard drink of alcohol     Types: 1 Cans of beer per week     Comment: "very moderate"     Drug use: No    Sexual activity: Not Currently     Social Determinants of Health     Financial Resource Strain: Medium Risk (11/8/2023)    Overall Financial Resource Strain (CARDIA)     Difficulty of Paying Living Expenses: Somewhat hard   Food Insecurity: Food Insecurity Present (11/8/2023)    Hunger Vital Sign     Worried About Running Out of Food in the Last Year: Sometimes true     Ran Out of Food in the Last Year: Patient declined   Transportation Needs: No Transportation Needs (11/8/2023)    PRAPARE - Transportation     Lack of Transportation (Medical): No     Lack of Transportation (Non-Medical): No   Physical Activity: Inactive (11/8/2023)    Exercise Vital Sign     Days of Exercise per Week: 0 days     Minutes of Exercise per Session: 0 min   Stress: No Stress Concern Present (11/8/2023)    [place] [employer] - Occupational Stress Questionnaire     Feeling of Stress : Only a little   Social Connections: Unknown (11/8/2023)    Social Connection and Isolation Panel [NHANES]     Frequency of Communication with Friends and Family: More than three times a week     Frequency of Social Gatherings with Friends and " "Family: Once a week     Active Member of Clubs or Organizations: No     Attends Club or Organization Meetings: Patient declined     Marital Status:    Housing Stability: Unknown (11/8/2023)    Housing Stability Vital Sign     Unable to Pay for Housing in the Last Year: Patient refused     Number of Places Lived in the Last Year: 1     Unstable Housing in the Last Year: No       Review of patient's allergies indicates:  No Known Allergies    Review of Systems:  General ROS: negative for - fever  Psychological ROS: negative for - hostility  Hematological and Lymphatic ROS: negative for - bleeding problems  Endocrine ROS: negative for - unexpected weight changes  Respiratory ROS: no cough, shortness of breath, or wheezing  Cardiovascular ROS: no chest pain or dyspnea on exertion  Gastrointestinal ROS: no abdominal pain, change in bowel habits, or black or bloody stools  Musculoskeletal ROS: negative for - muscular weakness  Neurological ROS: negative for - numbness/tingling  Dermatological ROS: negative for rash    Physical Exam:  Vitals:    01/03/24 0856   BP: 113/62   Pulse: 64   Weight: 97.1 kg (214 lb 2.8 oz)   Height: 5' 9" (1.753 m)   PainSc:   6   PainLoc: Back       Body mass index is 31.63 kg/m².     Gen: NAD  Gait:  Ambulates with cane  Psych:  Mood appropriate for given condition  HEENT: eyes anicteric   GI: Abd soft  CV: RRR  Lungs: breathing unlabored   ROM: limited AROM of the L spine in all planes, full ROM at ankles, knees and hips             Right Left   L2/3 Iliacus Hip flexion  5  5   L3/4 Qudratus Femoris Knee Extension  5  5   L4/5 Tib Anterior Ankle Dorsiflexion   5  5   L5/S1 Extensor Hallicus Longus Great toe extension  5  5                           S1/S2 Gastroc/Soleus Plantar Flexion  5  5         Imaging:  Xray lumbar spine 11/18/21  FINDINGS:  There is 4 mm anterolisthesis L2 on L3 which persists with flexion and extension position.  There is levoscoliosis centered at L3.  No pars " defects.  No displaced fracture with preserved vertebral body heights.  Bulky partially bridging osteophytes at several lower thoracic levels and several lower lumbar levels most notably about L5.  Degenerative disc disease mild at L2-3 and moderate at L3-4 through L5-S1.  Facet degenerative change lowest 4 lumbar levels.  Cholecystectomy clips.  Atherosclerosis.  Partially imaged transvenous pacing wire.    Xray right knee 11/18/21  FINDINGS:  Marked medial joint space narrowing is noted bilaterally.  There is moderate tricompartmental osteophyte formation.  There is a joint effusion on the right.  I do not see evidence of acute fracture.    X-ray lumbar spine 5/27/2022  FINDINGS:  There is a 12 degree levocurvature of the lumbar spine with associated rotatory component.  There is mild grade 1 retrolisthesis of L3 on L4.  There is loss of normal lordosis.  Alignment is otherwise within normal limits.  There is loss of disc height at L4/5, L5/S1 and to a lesser degree at L3/4.  There is associated anterior spondylosis, posterior spondylosis, sclerotic discogenic endplate changes and marked facet arthropathy.  These findings are most pronounced at L5/S1.  There is no displaced fracture or dislocation.     Vertebral body heights are preserved.  Arterial calcifications are present.     Impression:     1. Advanced multilevel disc and joint disease.    Labs:  BMP  Lab Results   Component Value Date     11/10/2023    K 4.2 11/10/2023     11/10/2023    CO2 25 11/10/2023    BUN 20 11/10/2023    CREATININE 0.75 11/10/2023    CALCIUM 10.0 11/10/2023    ANIONGAP 12 11/10/2023    ESTGFRAFRICA >60 07/28/2022    EGFRNONAA >60 07/28/2022     Lab Results   Component Value Date    ALT 25 11/10/2023    AST 37 11/10/2023    ALKPHOS 96 11/10/2023    BILITOT 0.8 11/10/2023       Assessment:   Problem List Items Addressed This Visit          Orthopedic    Chronic knee pain - Primary    Relevant Orders    Ambulatory  referral/consult to Physical Medicine Rehab    Chronic bilateral low back pain without sciatica     Other Visit Diagnoses       Pain management contract signed        Chronic pain disorder        Lumbar spondylosis        Dorsalgia        Osteoarthritis of right knee, unspecified osteoarthritis type                      78 y.o. year old male with PMH CAD s/p stents, a-fib, MORRIS, HTN who complains of back and right knee pain.  He's had this pain chronically for many years.  Has been taking hydrocodone as prescribed by his PCP.  Takes care of his disabled daughter at home.  Today his pain is 9/10, constant, sharp, aching, burning.  No numbness or weakness.  Pain can go down the right leg at times.    1/3/2024: Jay Han returns to the office for follow up.  He returns for follow-up with continued right knee pain, lower back pain and left shoulder pain.  He continues to be at his baseline with no new or significantly worsening pain.  He does report a fall roughly 4 weeks ago onto his right hip.  He had some lingering pain after the fall however that is improved with no significant remaining pain.  He denies any new numbness, weakness or any new changes to his bowel bladder function.  He continues to take hydrocodone 10/325 mg twice daily.  He denies any ill side effects or adverse events with his medication.  He does report some worsening dizziness they feels like contributed to his fall.  He has a substantial intentional 40-60 lb weight loss.       - on exam he has full strength.  - I believe his dizziness is likely a combination of potentially some low blood sugar as he has incorporated fasting into his diet.  Additionally with his weight loss he may need adjustment with his blood pressure medications.  He is going to follow-up with his primary care for this.  - for his right knee pain we discussed referral to PM&R for iovera.  Referral placed.  - we will continue with hydrocodone 10/325 mg 1 to 2 times a day as  needed for severe pain.  No signs of abuse, no adverse events noted, patient experiences significant benefit of analgesia, and patient demonstrates increased activity while on these medications.  The patient is meeting the goals of opioid therapy and is dependent on them for functionality and can not perform ADLS without them. Regarding opioids, the risks were discussed including tolerance, addiction, overdose, over-sedation, drug interactions, respiratory depression, opioid-induced hyperalgesia, and even death.   UDS 7/19/23 consistent   - refill for hydrocodone 10/325 mg 60 tablets sent to Dr. Marquis today for approval.  - follow up in 3 months or sooner if needed.      : Reviewed and consistent with medication use as prescribed.    The above note was completed, in part, with the aid of Dragon dictation software/hardware. Translation errors may be present.

## 2024-02-26 DIAGNOSIS — G89.29 CHRONIC BILATERAL LOW BACK PAIN WITHOUT SCIATICA: ICD-10-CM

## 2024-02-26 DIAGNOSIS — M54.50 CHRONIC BILATERAL LOW BACK PAIN WITHOUT SCIATICA: ICD-10-CM

## 2024-02-26 DIAGNOSIS — Z02.89 PAIN MANAGEMENT CONTRACT SIGNED: ICD-10-CM

## 2024-02-26 RX ORDER — HYDROCODONE BITARTRATE AND ACETAMINOPHEN 10; 325 MG/1; MG/1
1 TABLET ORAL EVERY 12 HOURS PRN
Qty: 60 TABLET | Refills: 0 | Status: SHIPPED | OUTPATIENT
Start: 2024-02-26 | End: 2024-03-26 | Stop reason: SDUPTHER

## 2024-02-26 NOTE — TELEPHONE ENCOUNTER
----- Message from Raya Lance sent at 2/26/2024  2:53 PM CST -----  Regarding: Refill  Type:  RX Refill Request    Who Called: Pt    Refill or New Rx:Refill    RX Name and Strength:HYDROcodone-acetaminophen (NORCO)  mg per tablet    How is the patient currently taking it? (ex. 1XDay):2xday    Is this a 30 day or 90 day RX:60    Preferred Pharmacy with phone number:  [facility] [address], [place] [address]  [address] [facility], [address] [address]  Phone: [phone] Fax: [phone]        Local or Mail Order:Local    Would the patient rather a call back or a response via [facility]? Call back    Best Call Back Number:[phone]    Additional Information: Refill------------- thank you

## 2024-03-26 ENCOUNTER — PATIENT MESSAGE (OUTPATIENT)
Dept: PAIN MEDICINE | Facility: CLINIC | Age: 79
End: 2024-03-26
Payer: MEDICARE

## 2024-03-26 DIAGNOSIS — Z02.89 PAIN MANAGEMENT CONTRACT SIGNED: ICD-10-CM

## 2024-03-26 DIAGNOSIS — G89.29 CHRONIC BILATERAL LOW BACK PAIN WITHOUT SCIATICA: ICD-10-CM

## 2024-03-26 DIAGNOSIS — M54.50 CHRONIC BILATERAL LOW BACK PAIN WITHOUT SCIATICA: ICD-10-CM

## 2024-03-26 RX ORDER — HYDROCODONE BITARTRATE AND ACETAMINOPHEN 10; 325 MG/1; MG/1
1 TABLET ORAL EVERY 12 HOURS PRN
Qty: 60 TABLET | Refills: 0 | Status: SHIPPED | OUTPATIENT
Start: 2024-03-26 | End: 2024-04-22 | Stop reason: SDUPTHER

## 2024-04-03 ENCOUNTER — OFFICE VISIT (OUTPATIENT)
Dept: PAIN MEDICINE | Facility: CLINIC | Age: 79
End: 2024-04-03
Payer: MEDICARE

## 2024-04-03 VITALS
HEIGHT: 69 IN | WEIGHT: 228.06 LBS | HEART RATE: 60 BPM | BODY MASS INDEX: 33.78 KG/M2 | DIASTOLIC BLOOD PRESSURE: 61 MMHG | SYSTOLIC BLOOD PRESSURE: 125 MMHG

## 2024-04-03 DIAGNOSIS — Z02.89 PAIN MANAGEMENT CONTRACT SIGNED: ICD-10-CM

## 2024-04-03 DIAGNOSIS — M54.50 CHRONIC BILATERAL LOW BACK PAIN WITHOUT SCIATICA: ICD-10-CM

## 2024-04-03 DIAGNOSIS — M25.561 CHRONIC PAIN OF RIGHT KNEE: Primary | ICD-10-CM

## 2024-04-03 DIAGNOSIS — M54.9 DORSALGIA: ICD-10-CM

## 2024-04-03 DIAGNOSIS — G89.4 CHRONIC PAIN DISORDER: ICD-10-CM

## 2024-04-03 DIAGNOSIS — G89.29 CHRONIC PAIN OF RIGHT KNEE: Primary | ICD-10-CM

## 2024-04-03 DIAGNOSIS — G89.29 CHRONIC BILATERAL LOW BACK PAIN WITHOUT SCIATICA: ICD-10-CM

## 2024-04-03 PROCEDURE — 1101F PT FALLS ASSESS-DOCD LE1/YR: CPT | Mod: CPTII,S$GLB,,

## 2024-04-03 PROCEDURE — 3078F DIAST BP <80 MM HG: CPT | Mod: CPTII,S$GLB,,

## 2024-04-03 PROCEDURE — 3074F SYST BP LT 130 MM HG: CPT | Mod: CPTII,S$GLB,,

## 2024-04-03 PROCEDURE — 3288F FALL RISK ASSESSMENT DOCD: CPT | Mod: CPTII,S$GLB,,

## 2024-04-03 PROCEDURE — 1159F MED LIST DOCD IN RCRD: CPT | Mod: CPTII,S$GLB,,

## 2024-04-03 PROCEDURE — 99999 PR PBB SHADOW E&M-EST. PATIENT-LVL III: CPT | Mod: PBBFAC,,,

## 2024-04-03 PROCEDURE — 99213 OFFICE O/P EST LOW 20 MIN: CPT | Mod: S$GLB,,,

## 2024-04-03 PROCEDURE — 1125F AMNT PAIN NOTED PAIN PRSNT: CPT | Mod: CPTII,S$GLB,,

## 2024-04-03 NOTE — PROGRESS NOTES
[facility] Follow Up Evaluation    Referred by: Dr. Lerner  Reason for referral: back and neck pain    CC:   Chief Complaint   Patient presents with    Low-back Pain    Hip Pain     Left side hip            4/3/2024     8:27 AM 1/3/2024     8:54 AM 10/4/2023     9:29 AM   Last 3 PDI Scores   Pain Disability Index (PDI) 32 [phone]       Interval HPI 4/3/2024: Jay Han returns to the office for follow up.  He returns for follow-up with continued knee pain, low back pain, left shoulder pain and some left hip pain.  He reports falling in his yd a few weeks ago and went to the emergency department.  X-rays did not show any fractures.  Short of this recent incident he is doing well.  He is going to follow-up with PM&R for Iovera.  He denies any new numbness, weakness or any new changes to his bowel bladder function.  He continues to take hydrocodone 10/325 mg twice daily.  He denies any ill side effects or adverse events with his medication.       Initial HPI:   Jay Han is a 79 y.o. male who complains of back and right knee pain.  He's had this pain chronically for many years.  Has been taking hydrocodone as prescribed by his PCP.  Takes care of his disabled daughter at home.  Today his pain is 9/10, constant, sharp, aching, burning.  No numbness or weakness.  Pain can go down the right leg at times.    History:    Current Outpatient Medications:     amiodarone (PACERONE) 200 MG Tab, TAKE 1 TABLET EVERY DAY, Disp: 90 tablet, Rfl: 1    amLODIPine (NORVASC) 10 MG tablet, TAKE 1 TABLET EVERY DAY, Disp: 90 tablet, Rfl: 3    ascorbic acid, vitamin C, (VITAMIN C) 1000 MG tablet, Take 1,000 mg by mouth once daily., Disp: , Rfl:     CALCIUM ORAL, Take 1,200 mg by mouth once daily., Disp: , Rfl:     cholecalciferol, vitamin D3, (VITAMIN D3 ORAL), Take 125 mcg by mouth once daily., Disp: , Rfl:     ELIQUIS 5 mg Tab, TAKE 1 TABLET TWICE DAILY, Disp: 180 tablet, Rfl: 1    enalapril (VASOTEC) 20 MG tablet, TAKE 2  TABLETS EVERY DAY, Disp: 180 tablet, Rfl: 3    hydroCHLOROthiazide (HYDRODIURIL) 25 MG tablet, TAKE 1 TABLET EVERY DAY, Disp: 90 tablet, Rfl: 3    HYDROcodone-acetaminophen (NORCO)  mg per tablet, Take 1 tablet by mouth every 12 (twelve) hours as needed for Pain., Disp: 60 tablet, Rfl: 0    LIDOcaine (LIDODERM) 5 %, Place 1 patch onto the skin once daily. Remove & Discard patch within 12 hours or as directed by MD for 15 days, Disp: 15 patch, Rfl: 0    MAGNESIUM ORAL, Take 500 mg by mouth Daily., Disp: , Rfl:     MEN'S MULTI-VITAMIN ORAL, Take 1 tablet by mouth once daily., Disp: , Rfl:     metoprolol tartrate (LOPRESSOR) 25 MG tablet, TAKE 1/2 TABLET TWICE DAILY, Disp: 90 tablet, Rfl: 3    pravastatin (PRAVACHOL) 40 MG tablet, TAKE 1 TABLET EVERY DAY, Disp: 90 tablet, Rfl: 1    SELENIUM ORAL, Take 200 mg by mouth once daily., Disp: , Rfl:     UNABLE TO FIND, Take by mouth once daily. medication name: D-ASPARTIC ACID 3000mg, Disp: , Rfl:     Past Medical History:   Diagnosis Date    Arthritis     Coronary artery disease     Dyslipidemia 1/14/2016    ED (erectile dysfunction)     Essential hypertension 1/14/2016    Essential hypertension, benign     H/O asbestos exposure     Hearing aid worn     History of chicken pox     History of wheezing     Hx of sarcoidosis     Hyperglycemia     Knee pain, chronic     right    Lipoprotein deficiencies     Lumbago     Lumbar herniated disc     Nonsustained ventricular tachycardia 1/14/2016    Other and unspecified hyperlipidemia     Other malaise and fatigue     Presence of stent in LAD coronary artery 2/11/2016    Presence of stent in left circumflex coronary artery 3/10/2016    Pure hyperglyceridemia     S/P coronary artery stent placement 3/10/2016    LAD, LCX    Shoulder pain     left shoulder    Ventricular premature complexes 1/14/2016       Past Surgical History:   Procedure Laterality Date    CARDIAC PACEMAKER PLACEMENT      CHOLECYSTECTOMY      COLONOSCOPY N/A  "10/26/2021    Dr. Kaur    CORONARY STENT PLACEMENT  01/2016    INSERTION OF PACEMAKER  2018    LUNG BIOPSY      benign       Family History   Problem Relation Age of Onset    Hypertension Mother     Hypertension Maternal Grandmother     Hypertension Maternal Grandfather     Kidney disease Father     Heart disease Maternal Uncle         heart attack, smoker    Glaucoma Neg Hx        Social History     Socioeconomic History    Marital status: Single   Tobacco Use    Smoking status: Never    Smokeless tobacco: Never   Substance and Sexual Activity    Alcohol use: Yes     Alcohol/week: 1.0 standard drink of alcohol     Types: 1 Cans of beer per week     Comment: "very moderate"     Drug use: No    Sexual activity: Not Currently     Social Determinants of Health     Financial Resource Strain: Medium Risk (11/8/2023)    Overall Financial Resource Strain (CARDIA)     Difficulty of Paying Living Expenses: Somewhat hard   Food Insecurity: Food Insecurity Present (11/8/2023)    Hunger Vital Sign     Worried About Running Out of Food in the Last Year: Sometimes true     Ran Out of Food in the Last Year: Patient declined   Transportation Needs: No Transportation Needs (11/8/2023)    PRAPARE - Transportation     Lack of Transportation (Medical): No     Lack of Transportation (Non-Medical): No   Physical Activity: Inactive (11/8/2023)    Exercise Vital Sign     Days of Exercise per Week: 0 days     Minutes of Exercise per Session: 0 min   Stress: No Stress Concern Present (11/8/2023)    [employer] [place] [employer] - Occupational Stress Questionnaire     Feeling of Stress : Only a little   Social Connections: Unknown (11/8/2023)    Social Connection and Isolation Panel [NHANES]     Frequency of Communication with Friends and Family: More than three times a week     Frequency of Social Gatherings with Friends and Family: Once a week     Active Member of Clubs or Organizations: No     Attends Club or Organization " "Meetings: Patient declined     Marital Status:    Housing Stability: Unknown (11/8/2023)    Housing Stability Vital Sign     Unable to Pay for Housing in the Last Year: Patient refused     Number of Places Lived in the Last Year: 1     Unstable Housing in the Last Year: No       Review of patient's allergies indicates:  No Known Allergies    Review of Systems:  General ROS: negative for - fever  Psychological ROS: negative for - hostility  Hematological and Lymphatic ROS: negative for - bleeding problems  Endocrine ROS: negative for - unexpected weight changes  Respiratory ROS: no cough, shortness of breath, or wheezing  Cardiovascular ROS: no chest pain or dyspnea on exertion  Gastrointestinal ROS: no abdominal pain, change in bowel habits, or black or bloody stools  Musculoskeletal ROS: negative for - muscular weakness  Neurological ROS: negative for - numbness/tingling  Dermatological ROS: negative for rash    Physical Exam:  Vitals:    04/03/24 0830   BP: 125/61   Pulse: 60   Weight: 103.4 kg (228 lb 1.1 oz)   Height: 5' 9" (1.753 m)   PainSc:   7   PainLoc: Back       Body mass index is 33.68 kg/m².     Gen: NAD  Gait:  Ambulates with cane  Psych:  Mood appropriate for given condition  HEENT: eyes anicteric   GI: Abd soft  CV: RRR  Lungs: breathing unlabored   ROM: limited AROM of the L spine in all planes, full ROM at ankles, knees and hips             Right Left   L2/3 Iliacus Hip flexion  5  5   L3/4 Qudratus Femoris Knee Extension  5  5   L4/5 Tib Anterior Ankle Dorsiflexion   5  5   L5/S1 Extensor Hallicus Longus Great toe extension  5  5                           S1/S2 Gastroc/Soleus Plantar Flexion  5  5         Imaging:  Xray lumbar spine 11/18/21  FINDINGS:  There is 4 mm anterolisthesis L2 on L3 which persists with flexion and extension position.  There is levoscoliosis centered at L3.  No pars defects.  No displaced fracture with preserved vertebral body heights.  Bulky partially bridging " osteophytes at several lower thoracic levels and several lower lumbar levels most notably about L5.  Degenerative disc disease mild at L2-3 and moderate at L3-4 through L5-S1.  Facet degenerative change lowest 4 lumbar levels.  Cholecystectomy clips.  Atherosclerosis.  Partially imaged transvenous pacing wire.    Xray right knee 11/18/21  FINDINGS:  Marked medial joint space narrowing is noted bilaterally.  There is moderate tricompartmental osteophyte formation.  There is a joint effusion on the right.  I do not see evidence of acute fracture.    X-ray lumbar spine 5/27/2022  FINDINGS:  There is a 12 degree levocurvature of the lumbar spine with associated rotatory component.  There is mild grade 1 retrolisthesis of L3 on L4.  There is loss of normal lordosis.  Alignment is otherwise within normal limits.  There is loss of disc height at L4/5, L5/S1 and to a lesser degree at L3/4.  There is associated anterior spondylosis, posterior spondylosis, sclerotic discogenic endplate changes and marked facet arthropathy.  These findings are most pronounced at L5/S1.  There is no displaced fracture or dislocation.     Vertebral body heights are preserved.  Arterial calcifications are present.     Impression:     1. Advanced multilevel disc and joint disease.    Labs:  BMP  Lab Results   Component Value Date     11/10/2023    K 4.2 11/10/2023     11/10/2023    CO2 25 11/10/2023    BUN 20 11/10/2023    CREATININE 0.75 11/10/2023    CALCIUM 10.0 11/10/2023    ANIONGAP 12 11/10/2023    ESTGFRAFRICA >60 07/28/2022    EGFRNONAA >60 07/28/2022     Lab Results   Component Value Date    ALT 25 11/10/2023    AST 37 11/10/2023    ALKPHOS 96 11/10/2023    BILITOT 0.8 11/10/2023       Assessment:   Problem List Items Addressed This Visit          Orthopedic    Chronic knee pain - Primary    Chronic bilateral low back pain without sciatica     Other Visit Diagnoses       Pain management contract signed        Chronic pain  disorder        Dorsalgia                        79 y.o. year old male with PMH CAD s/p stents, a-fib, MORRIS, HTN who complains of back and right knee pain.  He's had this pain chronically for many years.  Has been taking hydrocodone as prescribed by his PCP.  Takes care of his disabled daughter at home.  Today his pain is 9/10, constant, sharp, aching, burning.  No numbness or weakness.  Pain can go down the right leg at times.    4/3/2024: Jay Han returns to the office for follow up.  He returns for follow-up with continued knee pain, low back pain, left shoulder pain and some left hip pain.  He reports falling in his yd a few weeks ago and went to the emergency department.  X-rays did not show any fractures.  Short of this recent incident he is doing well.  He is going to follow-up with PM&R for Iovera.  He denies any new numbness, weakness or any new changes to his bowel bladder function.  He continues to take hydrocodone 10/325 mg twice daily.  He denies any ill side effects or adverse events with his medication.       - overall, doing well.  - he is scheduled to follow-up with PM&R for iovera.  - we will continue with hydrocodone 10/325 mg 1 to 2 times a day as needed for severe pain.  No signs of abuse, no adverse events noted, patient experiences significant benefit of analgesia, and patient demonstrates increased activity while on these medications.  The patient is meeting the goals of opioid therapy and is dependent on them for functionality and can not perform ADLS without them. Regarding opioids, the risks were discussed including tolerance, addiction, overdose, over-sedation, drug interactions, respiratory depression, opioid-induced hyperalgesia, and even death.   UDS 7/19/23 consistent   - no refill requested today for hydrocodone.  Recent refill sent.  - follow up in 3 months or sooner if needed.      : Reviewed and consistent with medication use as prescribed.    The above note was completed,  in part, with the aid of Dragon dictation software/hardware. Translation errors may be present.

## 2024-04-22 DIAGNOSIS — G89.29 CHRONIC BILATERAL LOW BACK PAIN WITHOUT SCIATICA: ICD-10-CM

## 2024-04-22 DIAGNOSIS — M54.50 CHRONIC BILATERAL LOW BACK PAIN WITHOUT SCIATICA: ICD-10-CM

## 2024-04-22 DIAGNOSIS — Z02.89 PAIN MANAGEMENT CONTRACT SIGNED: ICD-10-CM

## 2024-04-22 RX ORDER — HYDROCODONE BITARTRATE AND ACETAMINOPHEN 10; 325 MG/1; MG/1
1 TABLET ORAL EVERY 12 HOURS PRN
Qty: 60 TABLET | Refills: 0 | Status: SHIPPED | OUTPATIENT
Start: 2024-04-22 | End: 2024-05-20 | Stop reason: SDUPTHER

## 2024-05-13 ENCOUNTER — PATIENT MESSAGE (OUTPATIENT)
Dept: PAIN MEDICINE | Facility: CLINIC | Age: 79
End: 2024-05-13
Payer: MEDICARE

## 2024-05-20 ENCOUNTER — PATIENT MESSAGE (OUTPATIENT)
Dept: PAIN MEDICINE | Facility: CLINIC | Age: 79
End: 2024-05-20
Payer: MEDICARE

## 2024-05-20 DIAGNOSIS — Z02.89 PAIN MANAGEMENT CONTRACT SIGNED: ICD-10-CM

## 2024-05-20 DIAGNOSIS — G89.29 CHRONIC BILATERAL LOW BACK PAIN WITHOUT SCIATICA: ICD-10-CM

## 2024-05-20 DIAGNOSIS — M54.50 CHRONIC BILATERAL LOW BACK PAIN WITHOUT SCIATICA: ICD-10-CM

## 2024-05-20 RX ORDER — HYDROCODONE BITARTRATE AND ACETAMINOPHEN 10; 325 MG/1; MG/1
1 TABLET ORAL EVERY 12 HOURS PRN
Qty: 60 TABLET | Refills: 0 | Status: SHIPPED | OUTPATIENT
Start: 2024-05-23 | End: 2024-06-03 | Stop reason: SDUPTHER

## 2024-05-27 ENCOUNTER — TELEPHONE (OUTPATIENT)
Dept: PAIN MEDICINE | Facility: CLINIC | Age: 79
End: 2024-05-27
Payer: MEDICARE

## 2024-05-30 ENCOUNTER — TELEPHONE (OUTPATIENT)
Dept: PHYSICAL MEDICINE AND REHAB | Facility: CLINIC | Age: 79
End: 2024-05-30
Payer: MEDICARE

## 2024-05-30 NOTE — TELEPHONE ENCOUNTER
----- Message from Megan Barber sent at 5/30/2024 10:26 AM CDT -----  Contact: Self  Type: Needs Medical Advice    Who Called:  Patient  What is this regarding?:  He wants to know the name of the procedure that he is coming in for for his knee and if it will be covered by his insurance.  Best Call Back Number:  [phone]  Additional Information:  Please call the patient back at the phone number listed above to advise. Thank you!

## 2024-05-30 NOTE — TELEPHONE ENCOUNTER
PT is not ours and sees Dr. Fischer. He has cancelled multiple times for Isa and is now rescheduled for July. Prior Authorization is approved as of 6 months ago for Iovera of the knee. Pt was left vmail at 2:45pm on 5/30/24.     Trenton Rosario LPN

## 2024-06-03 ENCOUNTER — OFFICE VISIT (OUTPATIENT)
Dept: PAIN MEDICINE | Facility: CLINIC | Age: 79
End: 2024-06-03
Payer: MEDICARE

## 2024-06-03 VITALS
DIASTOLIC BLOOD PRESSURE: 77 MMHG | HEIGHT: 69 IN | BODY MASS INDEX: 34.91 KG/M2 | SYSTOLIC BLOOD PRESSURE: 149 MMHG | HEART RATE: 63 BPM | WEIGHT: 235.69 LBS

## 2024-06-03 DIAGNOSIS — Z02.89 PAIN MANAGEMENT CONTRACT SIGNED: ICD-10-CM

## 2024-06-03 DIAGNOSIS — G89.29 CHRONIC PAIN OF RIGHT KNEE: ICD-10-CM

## 2024-06-03 DIAGNOSIS — M25.512 CHRONIC LEFT SHOULDER PAIN: ICD-10-CM

## 2024-06-03 DIAGNOSIS — G89.29 CHRONIC BILATERAL LOW BACK PAIN WITHOUT SCIATICA: ICD-10-CM

## 2024-06-03 DIAGNOSIS — M54.50 CHRONIC BILATERAL LOW BACK PAIN WITHOUT SCIATICA: ICD-10-CM

## 2024-06-03 DIAGNOSIS — G89.29 CHRONIC KNEE PAIN, UNSPECIFIED LATERALITY: Primary | ICD-10-CM

## 2024-06-03 DIAGNOSIS — M25.561 CHRONIC PAIN OF RIGHT KNEE: ICD-10-CM

## 2024-06-03 DIAGNOSIS — M25.569 CHRONIC KNEE PAIN, UNSPECIFIED LATERALITY: Primary | ICD-10-CM

## 2024-06-03 DIAGNOSIS — G89.29 CHRONIC LEFT SHOULDER PAIN: ICD-10-CM

## 2024-06-03 DIAGNOSIS — M47.816 LUMBAR SPONDYLOSIS: ICD-10-CM

## 2024-06-03 DIAGNOSIS — M51.36 DDD (DEGENERATIVE DISC DISEASE), LUMBAR: Primary | ICD-10-CM

## 2024-06-03 PROCEDURE — 1100F PTFALLS ASSESS-DOCD GE2>/YR: CPT | Mod: CPTII,S$GLB,, | Performed by: PHYSICIAN ASSISTANT

## 2024-06-03 PROCEDURE — 3288F FALL RISK ASSESSMENT DOCD: CPT | Mod: CPTII,S$GLB,, | Performed by: PHYSICIAN ASSISTANT

## 2024-06-03 PROCEDURE — 1160F RVW MEDS BY RX/DR IN RCRD: CPT | Mod: CPTII,S$GLB,, | Performed by: PHYSICIAN ASSISTANT

## 2024-06-03 PROCEDURE — 99999 PR PBB SHADOW E&M-EST. PATIENT-LVL III: CPT | Mod: PBBFAC,,, | Performed by: PHYSICIAN ASSISTANT

## 2024-06-03 PROCEDURE — 99214 OFFICE O/P EST MOD 30 MIN: CPT | Mod: S$GLB,,, | Performed by: PHYSICIAN ASSISTANT

## 2024-06-03 PROCEDURE — 1125F AMNT PAIN NOTED PAIN PRSNT: CPT | Mod: CPTII,S$GLB,, | Performed by: PHYSICIAN ASSISTANT

## 2024-06-03 PROCEDURE — 3077F SYST BP >= 140 MM HG: CPT | Mod: CPTII,S$GLB,, | Performed by: PHYSICIAN ASSISTANT

## 2024-06-03 PROCEDURE — 3078F DIAST BP <80 MM HG: CPT | Mod: CPTII,S$GLB,, | Performed by: PHYSICIAN ASSISTANT

## 2024-06-03 PROCEDURE — 1159F MED LIST DOCD IN RCRD: CPT | Mod: CPTII,S$GLB,, | Performed by: PHYSICIAN ASSISTANT

## 2024-06-03 RX ORDER — HYDROCODONE BITARTRATE AND ACETAMINOPHEN 10; 325 MG/1; MG/1
1 TABLET ORAL EVERY 12 HOURS PRN
Qty: 60 TABLET | Refills: 0 | Status: SHIPPED | OUTPATIENT
Start: 2024-06-22 | End: 2024-07-22

## 2024-06-03 NOTE — PROGRESS NOTES
Mr. Han is very well known to me.  He was sent to me for his right knee pain to consider David.  X-rays from 2021 show marked medial joint space narrowing bilaterally and moderate tricompartmental osteophyte formation. David PA submitted in anticipation of his clinic visit.

## 2024-06-03 NOTE — TELEPHONE ENCOUNTER
Patient seen in clinic.  Please send prescription to his pharmacy. I have reviewed the [place] [employer] website and there are no abberancies.

## 2024-06-03 NOTE — PROGRESS NOTES
[facility] Follow Up Evaluation    Referred by: Dr. Lerner  Reason for referral: back and neck pain    CC:   Chief Complaint   Patient presents with    Knee Pain     Right knee    Back Pain    Arm Pain     LEFT ARM            6/3/2024     7:53 AM 4/3/2024     8:27 AM 1/3/2024     8:54 AM   Last 3 PDI Scores   Pain Disability Index (PDI) 38 32 34       Interval HPI 6/3/2024: Jay Han returns to the office for follow up.  He returns in follow-up today for medication refill.  The patient is new to me.  He complains of chronic left shoulder pain, chronic right knee pain, and low back pain.  He states that surgeries have been recommended for all 3 but he has to care for his daughter and can not take the time to recover following a major surgery.  He continues to take hydrocodone up to 2 times a day and he feels that this gets him through the day and overall his pain is tolerable.  He denies weakness or numbness.  He reports that he had were to lose a total of 65 pounds but has gained about 20 back.  He is going to work on weight loss.    Initial HPI:   Jay Han is a 79 y.o. male who complains of back and right knee pain.  He's had this pain chronically for many years.  Has been taking hydrocodone as prescribed by his PCP.  Takes care of his disabled daughter at home.  Today his pain is 9/10, constant, sharp, aching, burning.  No numbness or weakness.  Pain can go down the right leg at times.    History:    Current Outpatient Medications:     amiodarone (PACERONE) 200 MG Tab, Take 1 tablet (200 mg total) by mouth once daily., Disp: 90 tablet, Rfl: 1    amLODIPine (NORVASC) 10 MG tablet, TAKE 1 TABLET EVERY DAY, Disp: 90 tablet, Rfl: 3    ascorbic acid, vitamin C, (VITAMIN C) 1000 MG tablet, Take 1,000 mg by mouth once daily., Disp: , Rfl:     CALCIUM ORAL, Take 1,200 mg by mouth once daily., Disp: , Rfl:     cholecalciferol, vitamin D3, (VITAMIN D3 ORAL), Take 125 mcg by mouth once daily., Disp: ,  Rfl:     ELIQUIS 5 mg Tab, TAKE 1 TABLET TWICE DAILY, Disp: 180 tablet, Rfl: 1    enalapril (VASOTEC) 20 MG tablet, TAKE 2 TABLETS EVERY DAY, Disp: 180 tablet, Rfl: 3    hydroCHLOROthiazide (HYDRODIURIL) 25 MG tablet, Take 1 tablet (25 mg total) by mouth once daily., Disp: 90 tablet, Rfl: 0    HYDROcodone-acetaminophen (NORCO)  mg per tablet, Take 1 tablet by mouth every 12 (twelve) hours as needed for Pain., Disp: 60 tablet, Rfl: 0    MAGNESIUM ORAL, Take 500 mg by mouth Daily., Disp: , Rfl:     MEN'S MULTI-VITAMIN ORAL, Take 1 tablet by mouth once daily., Disp: , Rfl:     metoprolol tartrate (LOPRESSOR) 25 MG tablet, Take 1/2 tablet (12.5 mg) by mouth twice a day, Disp: 90 tablet, Rfl: 1    pravastatin (PRAVACHOL) 40 MG tablet, Take 1 tablet (40 mg total) by mouth every evening., Disp: 90 tablet, Rfl: 1    SELENIUM ORAL, Take 200 mg by mouth once daily., Disp: , Rfl:     UNABLE TO FIND, Take by mouth once daily. medication name: D-ASPARTIC ACID 3000mg, Disp: , Rfl:     Past Medical History:   Diagnosis Date    Arthritis     Coronary artery disease     Dyslipidemia 1/14/2016    ED (erectile dysfunction)     Essential hypertension 1/14/2016    Essential hypertension, benign     H/O asbestos exposure     Hearing aid worn     History of chicken pox     History of wheezing     Hx of sarcoidosis     Hyperglycemia     Knee pain, chronic     right    Lipoprotein deficiencies     Lumbago     Lumbar herniated disc     Nonsustained ventricular tachycardia 1/14/2016    Other and unspecified hyperlipidemia     Other malaise and fatigue     Presence of stent in LAD coronary artery 2/11/2016    Presence of stent in left circumflex coronary artery 3/10/2016    Pure hyperglyceridemia     S/P coronary artery stent placement 3/10/2016    LAD, LCX    Shoulder pain     left shoulder    Ventricular premature complexes 1/14/2016       Past Surgical History:   Procedure Laterality Date    CARDIAC PACEMAKER PLACEMENT       "CHOLECYSTECTOMY      COLONOSCOPY N/A 10/26/2021    Dr. Kaur    CORONARY STENT PLACEMENT  01/2016    INSERTION OF PACEMAKER  2018    LUNG BIOPSY      benign       Family History   Problem Relation Name Age of Onset    Hypertension Mother      Hypertension Maternal Grandmother      Hypertension Maternal Grandfather      Kidney disease Father      Heart disease Maternal Uncle          heart attack, smoker    Glaucoma Neg Hx         Social History     Socioeconomic History    Marital status: Single   Tobacco Use    Smoking status: Never    Smokeless tobacco: Never   Substance and Sexual Activity    Alcohol use: Yes     Alcohol/week: 1.0 standard drink of alcohol     Types: 1 Cans of beer per week     Comment: "very moderate"     Drug use: No    Sexual activity: Not Currently     Social Determinants of Health     Financial Resource Strain: Medium Risk (11/8/2023)    Overall Financial Resource Strain (CARDIA)     Difficulty of Paying Living Expenses: Somewhat hard   Food Insecurity: Food Insecurity Present (11/8/2023)    Hunger Vital Sign     Worried About Running Out of Food in the Last Year: Sometimes true     Ran Out of Food in the Last Year: Patient declined   Transportation Needs: No Transportation Needs (11/8/2023)    PRAPARE - Transportation     Lack of Transportation (Medical): No     Lack of Transportation (Non-Medical): No   Physical Activity: Inactive (11/8/2023)    Exercise Vital Sign     Days of Exercise per Week: 0 days     Minutes of Exercise per Session: 0 min   Stress: No Stress Concern Present (11/8/2023)    [employer] [place] [employer] - Occupational Stress Questionnaire     Feeling of Stress : Only a little   Housing Stability: Unknown (11/8/2023)    Housing Stability Vital Sign     Unable to Pay for Housing in the Last Year: Patient refused     Number of Places Lived in the Last Year: 1     Unstable Housing in the Last Year: No       Review of patient's allergies indicates:  No Known " "Allergies    Review of Systems:  General ROS: negative for - fever  Psychological ROS: negative for - hostility  Hematological and Lymphatic ROS: negative for - bleeding problems  Endocrine ROS: negative for - unexpected weight changes  Respiratory ROS: no cough, shortness of breath, or wheezing  Cardiovascular ROS: no chest pain or dyspnea on exertion  Gastrointestinal ROS: no abdominal pain, change in bowel habits, or black or bloody stools  Musculoskeletal ROS: negative for - muscular weakness  Neurological ROS: negative for - numbness/tingling  Dermatological ROS: negative for rash    Physical Exam:  Vitals:    06/03/24 0752   BP: (!) 149/77   Pulse: 63   Weight: 106.9 kg (235 lb 10.8 oz)   Height: 5' 9" (1.753 m)   PainSc:   6   PainLoc: Knee       Body mass index is 34.8 kg/m².     Gen: NAD  Gait:  Ambulates with cane  Psych:  Mood appropriate for given condition  HEENT: eyes anicteric   GI: Abd soft  CV: RRR  Lungs: breathing unlabored   ROM: limited AROM of the L spine in all planes, full ROM at ankles, knees and hips             Right Left   L2/3 Iliacus Hip flexion  5  5   L3/4 Qudratus Femoris Knee Extension  5  5   L4/5 Tib Anterior Ankle Dorsiflexion   5  5   L5/S1 Extensor Hallicus Longus Great toe extension  5  5                           S1/S2 Gastroc/Soleus Plantar Flexion  5  5         Imaging:  Xray lumbar spine 11/18/21  FINDINGS:  There is 4 mm anterolisthesis L2 on L3 which persists with flexion and extension position.  There is levoscoliosis centered at L3.  No pars defects.  No displaced fracture with preserved vertebral body heights.  Bulky partially bridging osteophytes at several lower thoracic levels and several lower lumbar levels most notably about L5.  Degenerative disc disease mild at L2-3 and moderate at L3-4 through L5-S1.  Facet degenerative change lowest 4 lumbar levels.  Cholecystectomy clips.  Atherosclerosis.  Partially imaged transvenous pacing wire.    Xray right knee " 11/18/21  FINDINGS:  Marked medial joint space narrowing is noted bilaterally.  There is moderate tricompartmental osteophyte formation.  There is a joint effusion on the right.  I do not see evidence of acute fracture.    X-ray lumbar spine 5/27/2022  FINDINGS:  There is a 12 degree levocurvature of the lumbar spine with associated rotatory component.  There is mild grade 1 retrolisthesis of L3 on L4.  There is loss of normal lordosis.  Alignment is otherwise within normal limits.  There is loss of disc height at L4/5, L5/S1 and to a lesser degree at L3/4.  There is associated anterior spondylosis, posterior spondylosis, sclerotic discogenic endplate changes and marked facet arthropathy.  These findings are most pronounced at L5/S1.  There is no displaced fracture or dislocation.     Vertebral body heights are preserved.  Arterial calcifications are present.     Impression:     1. Advanced multilevel disc and joint disease.    Labs:  BMP  Lab Results   Component Value Date     11/10/2023    K 4.2 11/10/2023     11/10/2023    CO2 25 11/10/2023    BUN 20 11/10/2023    CREATININE 0.75 11/10/2023    CALCIUM 10.0 11/10/2023    ANIONGAP 12 11/10/2023    ESTGFRAFRICA >60 07/28/2022    EGFRNONAA >60 07/28/2022     Lab Results   Component Value Date    ALT 25 11/10/2023    AST 37 11/10/2023    ALKPHOS 96 11/10/2023    BILITOT 0.8 11/10/2023       Assessment:   Problem List Items Addressed This Visit       Chronic knee pain    Chronic left shoulder pain     Other Visit Diagnoses       DDD (degenerative disc disease), lumbar    -  Primary    Lumbar spondylosis        Pain management contract signed                        79 y.o. year old male with PMH CAD s/p stents, a-fib, MORRIS, HTN who complains of back and right knee pain.  He's had this pain chronically for many years.  Has been taking hydrocodone as prescribed by his PCP.  Takes care of his disabled daughter at home.  Today his pain is 9/10, constant, sharp,  aching, burning.  No numbness or weakness.  Pain can go down the right leg at times.    6/3/2024: Jay Han returns to the office for follow up.  He returns in follow-up today for medication refill.  The patient is new to me.  He complains of chronic left shoulder pain, chronic right knee pain, and low back pain.  He states that surgeries have been recommended for all 3 but he has to care for his daughter and can not take the time to recover following a major surgery.  He continues to take hydrocodone up to 2 times a day and he feels that this gets him through the day and overall his pain is tolerable.  He denies weakness or numbness.  He reports that he had were to lose a total of 65 pounds but has gained about 20 back.  He is going to work on weight loss.    1.  provided a prescription for hydrocodone-acetaminophen 10/325 mg up to 2 times daily as needed for pain. No signs of abuse, no adverse events noted, patient experiences significant benefit of analgesia, and patient demonstrates increased activity while on these medications.  The patient is meeting the goals of opioid therapy and is dependent on them for functionality and can not perform ADLS without them. Regarding opioids, the risks were discussed including tolerance, addiction, overdose, over-sedation, drug interactions, respiratory depression, opioid-induced hyperalgesia, and even death.   UDS 7/19/23 consistent   2. Follow-up in 3 months or sooner as needed.      : Reviewed and consistent with medication use as prescribed.    The above note was completed, in part, with the aid of Dragon dictation software/hardware. Translation errors may be present.

## 2024-07-19 ENCOUNTER — PATIENT MESSAGE (OUTPATIENT)
Dept: PAIN MEDICINE | Facility: CLINIC | Age: 79
End: 2024-07-19
Payer: MEDICARE

## 2024-07-19 DIAGNOSIS — M54.50 CHRONIC BILATERAL LOW BACK PAIN WITHOUT SCIATICA: ICD-10-CM

## 2024-07-19 DIAGNOSIS — Z02.89 PAIN MANAGEMENT CONTRACT SIGNED: ICD-10-CM

## 2024-07-19 DIAGNOSIS — G89.29 CHRONIC BILATERAL LOW BACK PAIN WITHOUT SCIATICA: ICD-10-CM

## 2024-07-22 RX ORDER — HYDROCODONE BITARTRATE AND ACETAMINOPHEN 10; 325 MG/1; MG/1
1 TABLET ORAL EVERY 12 HOURS PRN
Qty: 60 TABLET | Refills: 0 | Status: SHIPPED | OUTPATIENT
Start: 2024-07-22 | End: 2024-08-21

## 2024-08-09 PROBLEM — E66.01 MORBID (SEVERE) OBESITY DUE TO EXCESS CALORIES: Status: RESOLVED | Noted: 2023-05-04 | Resolved: 2024-08-09

## 2024-08-20 ENCOUNTER — PATIENT MESSAGE (OUTPATIENT)
Dept: PAIN MEDICINE | Facility: CLINIC | Age: 79
End: 2024-08-20
Payer: MEDICARE

## 2024-08-20 DIAGNOSIS — Z02.89 PAIN MANAGEMENT CONTRACT SIGNED: ICD-10-CM

## 2024-08-20 DIAGNOSIS — M54.50 CHRONIC BILATERAL LOW BACK PAIN WITHOUT SCIATICA: ICD-10-CM

## 2024-08-20 DIAGNOSIS — G89.29 CHRONIC BILATERAL LOW BACK PAIN WITHOUT SCIATICA: ICD-10-CM

## 2024-08-21 ENCOUNTER — TELEPHONE (OUTPATIENT)
Dept: PAIN MEDICINE | Facility: CLINIC | Age: 79
End: 2024-08-21
Payer: MEDICARE

## 2024-08-21 RX ORDER — HYDROCODONE BITARTRATE AND ACETAMINOPHEN 10; 325 MG/1; MG/1
1 TABLET ORAL EVERY 12 HOURS PRN
Qty: 60 TABLET | Refills: 0 | Status: SHIPPED | OUTPATIENT
Start: 2024-08-21 | End: 2024-09-20

## 2024-08-29 ENCOUNTER — TELEPHONE (OUTPATIENT)
Facility: CLINIC | Age: 79
End: 2024-08-29
Payer: MEDICARE

## 2024-08-29 PROBLEM — Z77.090 HISTORY OF EXPOSURE TO ASBESTOS, PRESENTING HAZARDS TO HEALTH: Status: ACTIVE | Noted: 2024-08-29

## 2024-08-29 NOTE — NURSING
Received a pulmonary referral from [facility] for multiple lung nodules.   LVM for pt to call back and schedule.    Oncology Navigation   Intake  Cancer Type: LDCT/Incidental Lung Finding  Type of Referral: Internal  Date of Referral: 08/28/24  Initial Nurse Navigator Contact: 08/29/24  Referral to Initial Contact Timeline (days): 1     Treatment  Current Status: Staging work-up             Procedures: CT  CT Schedule Date: 08/28/24                 Acuity      Follow Up  No follow-ups on file.

## 2024-09-03 ENCOUNTER — DOCUMENTATION ONLY (OUTPATIENT)
Facility: CLINIC | Age: 79
End: 2024-09-03

## 2024-09-03 ENCOUNTER — TUMOR BOARD CONFERENCE (OUTPATIENT)
Dept: HEMATOLOGY/ONCOLOGY | Facility: CLINIC | Age: 79
End: 2024-09-03
Payer: MEDICARE

## 2024-09-03 ENCOUNTER — OFFICE VISIT (OUTPATIENT)
Facility: CLINIC | Age: 79
End: 2024-09-03
Payer: MEDICARE

## 2024-09-03 VITALS
BODY MASS INDEX: 34.11 KG/M2 | SYSTOLIC BLOOD PRESSURE: 118 MMHG | TEMPERATURE: 97 F | RESPIRATION RATE: 16 BRPM | HEART RATE: 62 BPM | DIASTOLIC BLOOD PRESSURE: 66 MMHG | HEIGHT: 68 IN | WEIGHT: 225.06 LBS | OXYGEN SATURATION: 96 %

## 2024-09-03 DIAGNOSIS — Z79.899 ON AMIODARONE THERAPY: ICD-10-CM

## 2024-09-03 DIAGNOSIS — R06.02 SOB (SHORTNESS OF BREATH): ICD-10-CM

## 2024-09-03 DIAGNOSIS — Z77.090 PERSONAL HISTORY OF EXPOSURE TO ASBESTOS, PRESENTING HAZARDS TO HEALTH: ICD-10-CM

## 2024-09-03 DIAGNOSIS — R93.89 ABNORMAL CHEST CT: Primary | ICD-10-CM

## 2024-09-03 PROCEDURE — 3074F SYST BP LT 130 MM HG: CPT | Mod: CPTII,S$GLB,, | Performed by: INTERNAL MEDICINE

## 2024-09-03 PROCEDURE — 1101F PT FALLS ASSESS-DOCD LE1/YR: CPT | Mod: CPTII,S$GLB,, | Performed by: INTERNAL MEDICINE

## 2024-09-03 PROCEDURE — 1125F AMNT PAIN NOTED PAIN PRSNT: CPT | Mod: CPTII,S$GLB,, | Performed by: INTERNAL MEDICINE

## 2024-09-03 PROCEDURE — 99204 OFFICE O/P NEW MOD 45 MIN: CPT | Mod: S$GLB,,, | Performed by: INTERNAL MEDICINE

## 2024-09-03 PROCEDURE — 3288F FALL RISK ASSESSMENT DOCD: CPT | Mod: CPTII,S$GLB,, | Performed by: INTERNAL MEDICINE

## 2024-09-03 PROCEDURE — 99999 PR PBB SHADOW E&M-EST. PATIENT-LVL V: CPT | Mod: PBBFAC,,, | Performed by: INTERNAL MEDICINE

## 2024-09-03 PROCEDURE — 1159F MED LIST DOCD IN RCRD: CPT | Mod: CPTII,S$GLB,, | Performed by: INTERNAL MEDICINE

## 2024-09-03 PROCEDURE — 3078F DIAST BP <80 MM HG: CPT | Mod: CPTII,S$GLB,, | Performed by: INTERNAL MEDICINE

## 2024-09-03 NOTE — PROGRESS NOTES
[facility]   Initial Office Visit  Chief Complaint   Patient presents with    Multiple Lung Nodules     New Patient       SUBJECTIVE:     History of Present Illness:  Patient is a 79 y.o. male presents for evaluation of abnormal CT scan. He started having dyspnea on exertion which began ca 2019 and which he attributed to weight. He was then around 280 lbs and feels it improved somewhat as he lost weight. He does not feel it has gotten particularly worse in the past year. He got a CXR on 8/9 which showed bilateral opacities, and a subsequent CT showed diffuse patchy ground glass opacity with more dense consolidations in the bases. He was treated with a course of doxycycline, as well as started on 20 mg daily of furosemide and given a brief course of steroids. All of this was done during the past month and he feels he has improved somewhat.    Smoking Hx: Never.    Exposures: Patient worked as a  and had exposure to asbestos via car brakes.    Past Medical History:   Diagnosis Date    Arthritis     Coronary artery disease     Dyslipidemia 01/14/2016    ED (erectile dysfunction)     Essential hypertension 01/14/2016    Essential hypertension, benign     H/O asbestos exposure     Hearing aid worn     History of chicken pox     History of wheezing     Hx of sarcoidosis     Hyperglycemia     Knee pain, chronic     right    Lipoprotein deficiencies     Lumbago     Lumbar herniated disc     Nonsustained ventricular tachycardia 01/14/2016    Other and unspecified hyperlipidemia     Other malaise and fatigue     Personal history of contact with and (suspected) exposure to asbestos     Presence of stent in LAD coronary artery 02/11/2016    Presence of stent in left circumflex coronary artery 03/10/2016    Pure hyperglyceridemia     S/P coronary artery stent placement 03/10/2016    LAD, LCX    Shoulder pain     left shoulder    Ventricular premature complexes 01/14/2016     Past Surgical  "History:   Procedure Laterality Date    CARDIAC PACEMAKER PLACEMENT      CHOLECYSTECTOMY      COLONOSCOPY N/A 10/26/2021    Dr. Kaur    CORONARY STENT PLACEMENT  01/2016    INSERTION OF PACEMAKER  2018    LUNG BIOPSY      benign     Family History   Problem Relation Name Age of Onset    Hypertension Mother      Hypertension Maternal Grandmother      Hypertension Maternal Grandfather      Kidney disease Father      Heart disease Maternal Uncle          heart attack, smoker    Glaucoma Neg Hx       Social History     Tobacco Use    Smoking status: Never     Passive exposure: Past    Smokeless tobacco: Never   Substance Use Topics    Alcohol use: Yes     Alcohol/week: 1.0 standard drink of alcohol     Types: 1 Cans of beer per week     Comment: "very moderate"     Drug use: No        Review of patient's allergies indicates:  No Known Allergies    Current Outpatient Medications on File Prior to Visit   Medication Sig Dispense Refill    amLODIPine (NORVASC) 10 MG tablet TAKE 1 TABLET EVERY DAY 90 tablet 3    ascorbic acid, vitamin C, (VITAMIN C) 1000 MG tablet Take 1,000 mg by mouth once daily.      CALCIUM ORAL Take 1,200 mg by mouth once daily.      cholecalciferol, vitamin D3, (VITAMIN D3 ORAL) Take 125 mcg by mouth once daily.      ELIQUIS 5 mg Tab TAKE 1 TABLET TWICE DAILY 180 tablet 1    enalapril (VASOTEC) 20 MG tablet TAKE 2 TABLETS EVERY  tablet 3    furosemide (LASIX) 20 MG tablet 1 tablet daily, take with potassium 90 tablet 1    HYDROcodone-acetaminophen (NORCO)  mg per tablet Take 1 tablet by mouth every 12 (twelve) hours as needed for Pain. 60 tablet 0    metoprolol tartrate (LOPRESSOR) 25 MG tablet TAKE ONE-HALF TABLET BY MOUTH TWICE DAILY 90 tablet 2    montelukast (SINGULAIR) 10 mg tablet Take 1 tablet (10 mg total) by mouth every evening. 90 tablet 3    potassium chloride SA (K-DUR,KLOR-CON M) 10 MEQ tablet Take 1 tablet (10 mEq total) by mouth once daily. 90 tablet 1    pravastatin " "(PRAVACHOL) 40 MG tablet TAKE ONE TABLET BY MOUTH ONCE DAILY 90 tablet 0    SELENIUM ORAL Take 200 mg by mouth once daily.      sulfamethoxazole-trimethoprim 800-160mg (BACTRIM DS) 800-160 mg Tab Take 1 tablet by mouth 2 (two) times daily. for 10 days 20 tablet 0    [DISCONTINUED] amiodarone (PACERONE) 200 MG Tab TAKE ONE TABLET BY MOUTH ONCE DAILY 90 tablet 0    [DISCONTINUED] MEN'S MULTI-VITAMIN ORAL Take 1 tablet by mouth once daily.      [DISCONTINUED] permethrin (ELIMITE) 5 % cream Apply topically once.      [DISCONTINUED] UNABLE TO FIND Take by mouth once daily. medication name: D-ASPARTIC ACID 3000mg       No current facility-administered medications on file prior to visit.         Review of Systems   See HPI for pertinent.    OBJECTIVE:     Vital Signs (Most Recent)  Visit Vitals  /66 (BP Location: Left arm, Patient Position: Sitting, BP Method: Medium (Manual))   Pulse 62   Temp 97.4 °F (36.3 °C) (Temporal)   Resp 16   Ht 5' 8" (1.727 m)   Wt 102.1 kg (225 lb 1.4 oz)   SpO2 96%   BMI 34.22 kg/m²     5' 8" (1.727 m)  102.1 kg (225 lb 1.4 oz)     Physical Exam:    General: no distress   Eye: Normal conjunctiva.  No scleral icterus  HENT: Normocephalic, atraumatic  Neck: Supple, No jugular venous distention.  Respiratory: normal rate and effort, no wheezing or crackles.   Cardiovascular: RRR   Extremities: no c/c/e  Integumentary: Warm, Dry.   Neurologic: Alert, Oriented, Normal motor function, No focal defects.       Diagnostic Results:    Echocardiogram  Results for orders placed during the hospital encounter of 05/12/20    Transesophageal echo (ROBERT) with possible cardioversion    Interpretation Summary  · Atrial fibrillation observed.  · Mildly decreased left ventricular systolic function. The estimated ejection fraction is 45%.  · No thrombus is present in the left atrial appendage.  · Normal right ventricular systolic function.  · Mild-to-moderate mitral regurgitation.  · Mild aortic " regurgitation.  · A 120 J synchronized cardioversion was unsuccessful without restoration of normal sinus rhythm.  · A 150 J synchronized cardioversion was successfully performed with restoration of AV paced rhythm.    Most Recent Nuclear Stress Test Results  No results found for this or any previous visit.    Most Recent Cardiac PET Stress Test Results  No results found for this or any previous visit.    Most Recent Cardiovascular Angiogram  No results found for this or any previous visit.    SpO2: 96 %    Significant Imaging:  CT: 8/28/24, personally reviewed and independently interpreted. There are diffuse ground glass opacities in the bases predominantly with septal thickening. Amid the areas of infiltrate there appear to be several soft-tissue nodules with the largest being 9 mm in size.     Test(s) Reviewed  I have personally reviewed the following in detail:  [] Chest Xray Images   [x] CT Images   [] Echocardiogram   [] Labs   [] Other:       Assessment:         ICD-10-CM ICD-9-CM   1. Abnormal chest CT  R93.89 793.2   2. SOB (shortness of breath)  R06.02 786.05   3. Personal history of exposure to asbestos, presenting hazards to health  Z77.090 V15.84   4. On amiodarone therapy  Z79.899 V58.69        Plan:   Abnormal chest CT  -     Ambulatory referral/consult to Pulmonology  -     RHEUMATOID FACTOR; Future; Expected date: 09/03/2024  -     CYCLIC CITRUL PEPTIDE ANTIBODY, IGG; Future; Expected date: 09/03/2024  -     CT Chest Without Contrast; Future; Expected date: 12/03/2024  -     Complete PFT w/ bronchodilator; Future; Expected date: 12/03/2024  -     Complete PFT w/ bronchodilator; Future  -     Misc Sendout Test, Blood [id number] reflex; Future; Expected date: 09/03/2024    SOB (shortness of breath)  -     Ambulatory referral/consult to Pulmonology  -     Echo; Future  -     B-TYPE NATRIURETIC PEPTIDE; Future; Expected date: 09/03/2024  -     Complete PFT w/ bronchodilator; Future; Expected date:  12/03/2024  -     Complete PFT w/ bronchodilator; Future  -     Misc Sendout Test, Blood [id number] YOVANY reflex; Future; Expected date: 09/03/2024    Personal history of exposure to asbestos, presenting hazards to health  -     Ambulatory referral/consult to Pulmonology  -     Misc Sendout Test, Blood [id number] YOVANY reflex; Future; Expected date: 09/03/2024    On amiodarone therapy  -     Complete PFT w/ bronchodilator; Future; Expected date: 12/03/2024  -     Complete PFT w/ bronchodilator; Future  -     Misc Sendout Test, Blood [id number] [id number] reflex; Future; Expected date: 09/03/2024    Findings are concerning for ILD but he is relatively asymptomatic and on room air despite the imaging findings. There are some underlying nodules, age indeterminate. A CT from 2020 seems to show smaller nodules in similar locations to some of the nodules noted on the recent scan but it is difficult to separate the underlying nodules from the infiltrate in placed.    Stop amiodarone as this is a potential cause of ILD.  Get PFT now and again in 3 months.   Repeat CT in 3 months. Call if any new or worsening symptoms.    F/u in 3 months.    See labs and med orders.    Medications have been reviewed and reconciled.      Portions of this note may have been created with voice recognition software.  Grammatical, syntax and spelling errors may be inevitable.

## 2024-09-03 NOTE — PROGRESS NOTES
[facility]      THORACIC MULTIDISCIPLINARY TUMOR BOARD  PATIENT REVIEW FORM  ___________________________________________________________________    CLINIC #: [id number]  DATE: 09/03/2024    TUMOR SITE:   Left Lung nodule    Presenting Hospital / Clinic: [facility]     Virtual Tumor Board Conference: In person       PRESENTER:   Presenter: Dr. Leyva     Specialties Present: Medical Oncology; Hematology; Radiation Oncology; Surgical Oncology; Pathology; Navigation; Radiology; Pulmonology       PATIENT SUMMARY:   Patient is a 79 y.o. male presents for evaluation of abnormal CT scan. He started having dyspnea on exertion which began ca 2019 and which he attributed to weight. He was then around 280 lbs and feels it improved somewhat as he lost weight. He does not feel it has gotten particularly worse in the past year. He got a CXR on 8/9 which showed bilateral opacities, and a subsequent CT showed diffuse patchy ground glass opacity with more dense consolidations in the bases. He was treated with a course of doxycycline, as well as started on 20 mg daily of furosemide and given a brief course of steroids. All of this was done during the past month and he feels he has improved somewhat.     Smoking Hx: Never.     Exposures: Patient worked as a  and had exposure to asbestos via car brakes.    Background Information  Patient Status: a new patient  Reason for Consultation: Initial Presentation         BOARD RECOMMENDATIONS:   --Plan to f/u with pulmonary in 3months with CT. Dr. Lee to workup for ILD      Cancer Staging   No matching staging information was found for the patient.             [x] [facility] Treatment Guidelines reviewed and care planned is consistent with guidelines.         (i.e., NCCN, NCI, PD, ACO, AUA, etc.)    PRESENTATION AT CANCER CONFERENCE:   No data recorded     CLINICAL TRIAL ELIGIBILITY:   No  data recorded     Radha Lofton

## 2024-09-03 NOTE — PROGRESS NOTES
Met with patient in pulmonary clinic today.  Explained my role as navigator.  Reviewed plan of care and answered questions.    Plan is for PFT's, echo and labs now. 3m f/u with CT and PFT's at [facility].  My contact information was provided and pt was encouraged to call with any questions or concerns.  Pt verbalized understanding.

## 2024-09-06 ENCOUNTER — OFFICE VISIT (OUTPATIENT)
Dept: PAIN MEDICINE | Facility: CLINIC | Age: 79
End: 2024-09-06
Payer: MEDICARE

## 2024-09-06 VITALS
HEART RATE: 62 BPM | BODY MASS INDEX: 33.95 KG/M2 | WEIGHT: 224 LBS | SYSTOLIC BLOOD PRESSURE: 106 MMHG | HEIGHT: 68 IN | DIASTOLIC BLOOD PRESSURE: 63 MMHG

## 2024-09-06 DIAGNOSIS — G89.29 CHRONIC BILATERAL LOW BACK PAIN WITHOUT SCIATICA: ICD-10-CM

## 2024-09-06 DIAGNOSIS — M51.36 DDD (DEGENERATIVE DISC DISEASE), LUMBAR: ICD-10-CM

## 2024-09-06 DIAGNOSIS — M25.561 CHRONIC PAIN OF RIGHT KNEE: ICD-10-CM

## 2024-09-06 DIAGNOSIS — M47.816 LUMBAR SPONDYLOSIS: ICD-10-CM

## 2024-09-06 DIAGNOSIS — M54.50 CHRONIC BILATERAL LOW BACK PAIN WITHOUT SCIATICA: ICD-10-CM

## 2024-09-06 DIAGNOSIS — G89.4 CHRONIC PAIN DISORDER: ICD-10-CM

## 2024-09-06 DIAGNOSIS — Z02.89 PAIN MANAGEMENT CONTRACT SIGNED: ICD-10-CM

## 2024-09-06 DIAGNOSIS — M17.11 OSTEOARTHRITIS OF RIGHT KNEE, UNSPECIFIED OSTEOARTHRITIS TYPE: ICD-10-CM

## 2024-09-06 DIAGNOSIS — G89.29 CHRONIC PAIN OF RIGHT KNEE: ICD-10-CM

## 2024-09-06 DIAGNOSIS — Z02.89 PAIN MANAGEMENT CONTRACT SIGNED: Primary | ICD-10-CM

## 2024-09-06 PROCEDURE — 99999 PR PBB SHADOW E&M-EST. PATIENT-LVL II: CPT | Mod: PBBFAC,,,

## 2024-09-06 NOTE — PROGRESS NOTES
[facility] Follow Up Evaluation    Referred by: Dr. Lerner  Reason for referral: back and neck pain    CC:   Chief Complaint   Patient presents with    Follow-up      PAIN - 3 month f/u              9/6/2024     2:21 PM 6/3/2024     7:53 AM 4/3/2024     8:27 AM   Last 3 PDI Scores   Pain Disability Index (PDI) 17 38 32     Interval HPI 9/6/2024: Jay Han returns to the office for follow up.  He returns for follow-up with continued low back pain and bilateral knee pain, ultimately at baseline for pain with no new or significantly worsening symptoms.  Denies any new numbness, weakness or any new changes to his bowel or bladder function.  He continues to take hydrocodone 10/325 mg up to 2 times a day with added relief and no ill side effects or adverse events.      Initial HPI:   Jay Han is a 79 y.o. male who complains of back and right knee pain.  He's had this pain chronically for many years.  Has been taking hydrocodone as prescribed by his PCP.  Takes care of his disabled daughter at home.  Today his pain is 9/10, constant, sharp, aching, burning.  No numbness or weakness.  Pain can go down the right leg at times.    History:    Current Outpatient Medications:     amLODIPine (NORVASC) 10 MG tablet, TAKE 1 TABLET EVERY DAY, Disp: 90 tablet, Rfl: 3    ascorbic acid, vitamin C, (VITAMIN C) 1000 MG tablet, Take 1,000 mg by mouth once daily., Disp: , Rfl:     CALCIUM ORAL, Take 1,200 mg by mouth once daily., Disp: , Rfl:     cholecalciferol, vitamin D3, (VITAMIN D3 ORAL), Take 125 mcg by mouth once daily., Disp: , Rfl:     ELIQUIS 5 mg Tab, TAKE 1 TABLET TWICE DAILY, Disp: 180 tablet, Rfl: 1    enalapril (VASOTEC) 20 MG tablet, TAKE 2 TABLETS EVERY DAY, Disp: 180 tablet, Rfl: 3    furosemide (LASIX) 20 MG tablet, 1 tablet daily, take with potassium, Disp: 90 tablet, Rfl: 1    HYDROcodone-acetaminophen (NORCO)  mg per tablet, Take 1 tablet by mouth every 12 (twelve) hours as needed for  Pain., Disp: 60 tablet, Rfl: 0    metoprolol tartrate (LOPRESSOR) 25 MG tablet, TAKE ONE-HALF TABLET BY MOUTH TWICE DAILY, Disp: 90 tablet, Rfl: 2    montelukast (SINGULAIR) 10 mg tablet, Take 1 tablet (10 mg total) by mouth every evening., Disp: 90 tablet, Rfl: 3    potassium chloride SA (K-DUR,KLOR-CON M) 10 MEQ tablet, Take 1 tablet (10 mEq total) by mouth once daily., Disp: 90 tablet, Rfl: 1    pravastatin (PRAVACHOL) 40 MG tablet, TAKE ONE TABLET BY MOUTH ONCE DAILY, Disp: 90 tablet, Rfl: 0    SELENIUM ORAL, Take 200 mg by mouth once daily., Disp: , Rfl:     sulfamethoxazole-trimethoprim 800-160mg (BACTRIM DS) 800-160 mg Tab, Take 1 tablet by mouth 2 (two) times daily. for 10 days, Disp: 20 tablet, Rfl: 0    Past Medical History:   Diagnosis Date    Arthritis     Coronary artery disease     Dyslipidemia 01/14/2016    ED (erectile dysfunction)     Essential hypertension 01/14/2016    Essential hypertension, benign     H/O asbestos exposure     Hearing aid worn     History of chicken pox     History of wheezing     Hx of sarcoidosis     Hyperglycemia     Knee pain, chronic     right    Lipoprotein deficiencies     Lumbago     Lumbar herniated disc     Nonsustained ventricular tachycardia 01/14/2016    Other and unspecified hyperlipidemia     Other malaise and fatigue     Personal history of contact with and (suspected) exposure to asbestos     Presence of stent in LAD coronary artery 02/11/2016    Presence of stent in left circumflex coronary artery 03/10/2016    Pure hyperglyceridemia     S/P coronary artery stent placement 03/10/2016    LAD, LCX    Shoulder pain     left shoulder    Ventricular premature complexes 01/14/2016       Past Surgical History:   Procedure Laterality Date    CARDIAC PACEMAKER PLACEMENT      CHOLECYSTECTOMY      COLONOSCOPY N/A 10/26/2021    Dr. Kaur    CORONARY STENT PLACEMENT  01/2016    INSERTION OF PACEMAKER  2018    LUNG BIOPSY      benign       Family History   Problem Relation  "Name Age of Onset    Hypertension Mother      Hypertension Maternal Grandmother      Hypertension Maternal Grandfather      Kidney disease Father      Heart disease Maternal Uncle          heart attack, smoker    Glaucoma Neg Hx         Social History     Socioeconomic History    Marital status: Single   Tobacco Use    Smoking status: Never     Passive exposure: Past    Smokeless tobacco: Never   Substance and Sexual Activity    Alcohol use: Yes     Alcohol/week: 1.0 standard drink of alcohol     Types: 1 Cans of beer per week     Comment: "very moderate"     Drug use: No    Sexual activity: Not Currently     Social Determinants of Health     Financial Resource Strain: Medium Risk (8/31/2024)    Overall Financial Resource Strain (CARDIA)     Difficulty of Paying Living Expenses: Somewhat hard   Food Insecurity: Patient Declined (8/31/2024)    Hunger Vital Sign     Worried About Running Out of Food in the Last Year: Patient declined     Ran Out of Food in the Last Year: Patient declined   Transportation Needs: No Transportation Needs (11/8/2023)    PRAPARE - Transportation     Lack of Transportation (Medical): No     Lack of Transportation (Non-Medical): No   Physical Activity: Inactive (8/31/2024)    Exercise Vital Sign     Days of Exercise per Week: 0 days     Minutes of Exercise per Session: 10 min   Stress: No Stress Concern Present (8/31/2024)    [employer] [place] [employer] - Occupational Stress Questionnaire     Feeling of Stress : Only a little   Housing Stability: Unknown (8/31/2024)    Housing Stability Vital Sign     Unable to Pay for Housing in the Last Year: No       Review of patient's allergies indicates:  No Known Allergies    Review of Systems:  General ROS: negative for - fever  Psychological ROS: negative for - hostility  Hematological and Lymphatic ROS: negative for - bleeding problems  Endocrine ROS: negative for - unexpected weight changes  Respiratory ROS: no cough, shortness of " "breath, or wheezing  Cardiovascular ROS: no chest pain or dyspnea on exertion  Gastrointestinal ROS: no abdominal pain, change in bowel habits, or black or bloody stools  Musculoskeletal ROS: negative for - muscular weakness  Neurological ROS: negative for - numbness/tingling  Dermatological ROS: negative for rash    Physical Exam:  Vitals:    09/06/24 1423   BP: 106/63   Pulse: 62   Weight: 101.6 kg (223 lb 15.8 oz)   Height: 5' 8" (1.727 m)   PainSc:   5   PainLoc: Back       Body mass index is 34.06 kg/m².     Gen: NAD  Gait:  Ambulates with cane  Psych:  Mood appropriate for given condition  HEENT: eyes anicteric   GI: Abd soft  CV: RRR  Lungs: breathing unlabored   ROM: limited AROM of the L spine in all planes, full ROM at ankles, knees and hips        Imaging:  Xray lumbar spine 11/18/21  FINDINGS:  There is 4 mm anterolisthesis L2 on L3 which persists with flexion and extension position.  There is levoscoliosis centered at L3.  No pars defects.  No displaced fracture with preserved vertebral body heights.  Bulky partially bridging osteophytes at several lower thoracic levels and several lower lumbar levels most notably about L5.  Degenerative disc disease mild at L2-3 and moderate at L3-4 through L5-S1.  Facet degenerative change lowest 4 lumbar levels.  Cholecystectomy clips.  Atherosclerosis.  Partially imaged transvenous pacing wire.    Xray right knee 11/18/21  FINDINGS:  Marked medial joint space narrowing is noted bilaterally.  There is moderate tricompartmental osteophyte formation.  There is a joint effusion on the right.  I do not see evidence of acute fracture.    X-ray lumbar spine 5/27/2022  FINDINGS:  There is a 12 degree levocurvature of the lumbar spine with associated rotatory component.  There is mild grade 1 retrolisthesis of L3 on L4.  There is loss of normal lordosis.  Alignment is otherwise within normal limits.  There is loss of disc height at L4/5, L5/S1 and to a lesser degree at L3/4. "  There is associated anterior spondylosis, posterior spondylosis, sclerotic discogenic endplate changes and marked facet arthropathy.  These findings are most pronounced at L5/S1.  There is no displaced fracture or dislocation.     Vertebral body heights are preserved.  Arterial calcifications are present.     Impression:     1. Advanced multilevel disc and joint disease.    Labs:  BMP  Lab Results   Component Value Date     08/09/2024    K 3.8 08/09/2024     08/09/2024    CO2 23 08/09/2024    BUN 22 (H) 08/09/2024    CREATININE 0.97 08/09/2024    CALCIUM 9.8 08/09/2024    ANIONGAP 11 08/09/2024    ESTGFRAFRICA >60 07/28/2022    EGFRNONAA >60 07/28/2022     Lab Results   Component Value Date    ALT 24 08/09/2024    AST 33 08/09/2024    ALKPHOS 117 08/09/2024    BILITOT 0.5 08/09/2024       Assessment:   Problem List Items Addressed This Visit          Orthopedic    Chronic knee pain    Chronic bilateral low back pain without sciatica     Other Visit Diagnoses       Pain management contract signed    -  Primary    DDD (degenerative disc disease), lumbar        Lumbar spondylosis        Chronic pain disorder        Osteoarthritis of right knee, unspecified osteoarthritis type                          79 y.o. year old male with PMH CAD s/p stents, a-fib, MORRIS, HTN who complains of back and right knee pain.  He's had this pain chronically for many years.  Has been taking hydrocodone as prescribed by his PCP.  Takes care of his disabled daughter at home.  Today his pain is 9/10, constant, sharp, aching, burning.  No numbness or weakness.  Pain can go down the right leg at times.    9/6/2024: Jay Han returns to the office for follow up.  He returns for follow-up with continued low back pain and bilateral knee pain, ultimately at baseline for pain with no new or significantly worsening symptoms.  Denies any new numbness, weakness or any new changes to his bowel or bladder function.  He continues to take  hydrocodone 10/325 mg up to 2 times a day with added relief and no ill side effects or adverse events.    - at baseline for pain with no new or worsening symptoms.  - at this time we will continue to maximize conservative therapy with rest and medications.  In the future if back pain worsens can consider diagnostic medial branch blocks.  Can also consider Iovera for knees.  He was previously scheduled for this but canceled.  - refill for hydrocodone-acetaminophen 10/325 mg for up to 2 times daily sent to Dr. Casey today for approval.  -  No signs of abuse, no adverse events noted, patient experiences significant benefit of analgesia, and patient demonstrates increased activity while on these medications.  The patient is meeting the goals of opioid therapy and is dependent on them for functionality and can not perform ADLS without them. Regarding opioids, the risks were discussed including tolerance, addiction, overdose, over-sedation, drug interactions, respiratory depression, opioid-induced hyperalgesia, and even death.   UDS 7/19/23 consistent   - follow up in 3 months or sooner if needed      : Reviewed and consistent with medication use as prescribed.    The above note was completed, in part, with the aid of Dragon dictation software/hardware. Translation errors may be present.

## 2024-09-09 RX ORDER — HYDROCODONE BITARTRATE AND ACETAMINOPHEN 10; 325 MG/1; MG/1
1 TABLET ORAL EVERY 12 HOURS PRN
Qty: 60 TABLET | Refills: 0 | Status: SHIPPED | OUTPATIENT
Start: 2024-09-20 | End: 2024-10-20

## 2024-09-16 ENCOUNTER — HOSPITAL ENCOUNTER (OUTPATIENT)
Dept: CARDIOLOGY | Facility: HOSPITAL | Age: 79
Discharge: HOME | End: 2024-09-16
Attending: INTERNAL MEDICINE
Payer: MEDICARE

## 2024-09-16 VITALS — WEIGHT: 220 LBS | BODY MASS INDEX: 33.34 KG/M2 | HEIGHT: 68 IN

## 2024-09-16 DIAGNOSIS — R06.02 SOB (SHORTNESS OF BREATH): ICD-10-CM

## 2024-09-16 LAB
ASCENDING AORTA: 4.01 CM
AV INDEX (PROSTH): 0.76
AV MEAN GRADIENT: 6 MMHG
AV PEAK GRADIENT: 12 MMHG
AV REGURGITATION PRESSURE HALF TIME: 849.77 MS
AV VALVE AREA BY VELOCITY RATIO: 2.91 CM²
AV VALVE AREA: 3.15 CM²
AV VELOCITY RATIO: 0.71
BSA FOR ECHO PROCEDURE: 2.19 M2
CV ECHO LV RWT: 0.4 CM
DOP CALC AO PEAK VEL: 1.71 M/S
DOP CALC AO VTI: 33.6 CM
DOP CALC LVOT AREA: 4.1 CM2
DOP CALC LVOT DIAMETER: 2.29 CM
DOP CALC LVOT PEAK VEL: 1.21 M/S
DOP CALC LVOT STROKE VOLUME: 105.8 CM3
DOP CALCLVOT PEAK VEL VTI: 25.7 CM
E WAVE DECELERATION TIME: 180.69 MSEC
E/A RATIO: 0.74
E/E' RATIO: 13.85 M/S
ECHO LV POSTERIOR WALL: 1.2 CM (ref 0.6–1.1)
EJECTION FRACTION: 60 %
FRACTIONAL SHORTENING: 29 % (ref 28–44)
INTERVENTRICULAR SEPTUM: 1.43 CM (ref 0.6–1.1)
IVRT: 111.32 MSEC
LEFT ATRIUM AREA SYSTOLIC (APICAL 2 CHAMBER): 15.07 CM2
LEFT ATRIUM AREA SYSTOLIC (APICAL 4 CHAMBER): 17.83 CM2
LEFT ATRIUM SIZE: 4.51 CM
LEFT ATRIUM VOLUME INDEX MOD: 19 ML/M2
LEFT ATRIUM VOLUME MOD: 40.51 CM3
LEFT INTERNAL DIMENSION IN SYSTOLE: 4.29 CM (ref 2.1–4)
LEFT VENTRICLE DIASTOLIC VOLUME INDEX: 85.14 ML/M2
LEFT VENTRICLE DIASTOLIC VOLUME: 181.35 ML
LEFT VENTRICLE END SYSTOLIC VOLUME APICAL 2 CHAMBER: 35.39 ML
LEFT VENTRICLE END SYSTOLIC VOLUME APICAL 4 CHAMBER: 36.88 ML
LEFT VENTRICLE MASS INDEX: 168 G/M2
LEFT VENTRICLE SYSTOLIC VOLUME INDEX: 38.9 ML/M2
LEFT VENTRICLE SYSTOLIC VOLUME: 82.77 ML
LEFT VENTRICULAR INTERNAL DIMENSION IN DIASTOLE: 6.02 CM (ref 3.5–6)
LEFT VENTRICULAR MASS: 357.57 G
LV LATERAL E/E' RATIO: 11.25 M/S
LV SEPTAL E/E' RATIO: 18 M/S
LVED V (TEICH): 181.35 ML
LVES V (TEICH): 82.77 ML
LVOT MG: 2.92 MMHG
LVOT MV: 0.79 CM/S
MV PEAK A VEL: 1.21 M/S
MV PEAK E VEL: 0.9 M/S
MV STENOSIS PRESSURE HALF TIME: 52.4 MS
MV VALVE AREA P 1/2 METHOD: 4.2 CM2
OHS CV RV/LV RATIO: 0.68 CM
PISA AR MAX VEL: 3.66 M/S
PISA TR MAX VEL: 2.75 M/S
PULM VEIN S/D RATIO: 0.79
PV PEAK D VEL: 0.34 M/S
PV PEAK S VEL: 0.27 M/S
RA PRESSURE ESTIMATED: 3 MMHG
RIGHT VENTRICLE DIASTOLIC LENGTH: 6.5 CM
RIGHT VENTRICLE DIASTOLIC MID DIMENSION: 2.8 CM
RIGHT VENTRICULAR END-DIASTOLIC DIMENSION: 4.1 CM
RIGHT VENTRICULAR LENGTH IN DIASTOLE (APICAL 4-CHAMBER VIEW): 6.48 CM
RV MID DIAMA: 2.76 CM
RV TB RVSP: 6 MMHG
RV TISSUE DOPPLER FREE WALL SYSTOLIC VELOCITY 1 (APICAL 4 CHAMBER VIEW): 16.21 CM/S
SINUS: 3.74 CM
STJ: 3.03 CM
TDI LATERAL: 0.08 M/S
TDI SEPTAL: 0.05 M/S
TDI: 0.07 M/S
TR MAX PG: 30 MMHG
TRICUSPID ANNULAR PLANE SYSTOLIC EXCURSION: 2.45 CM
TV REST PULMONARY ARTERY PRESSURE: 33 MMHG
Z-SCORE OF LEFT VENTRICULAR DIMENSION IN END DIASTOLE: -1.2
Z-SCORE OF LEFT VENTRICULAR DIMENSION IN END SYSTOLE: 0.28

## 2024-09-16 PROCEDURE — 93306 TTE W/DOPPLER COMPLETE: CPT | Mod: PO

## 2024-09-16 PROCEDURE — 93306 TTE W/DOPPLER COMPLETE: CPT | Mod: 26,,, | Performed by: INTERNAL MEDICINE

## 2024-10-16 ENCOUNTER — PATIENT MESSAGE (OUTPATIENT)
Dept: PAIN MEDICINE | Facility: CLINIC | Age: 79
End: 2024-10-16
Payer: MEDICARE

## 2024-10-16 DIAGNOSIS — Z02.89 PAIN MANAGEMENT CONTRACT SIGNED: ICD-10-CM

## 2024-10-16 DIAGNOSIS — G89.29 CHRONIC BILATERAL LOW BACK PAIN WITHOUT SCIATICA: ICD-10-CM

## 2024-10-16 DIAGNOSIS — M54.50 CHRONIC BILATERAL LOW BACK PAIN WITHOUT SCIATICA: ICD-10-CM

## 2024-10-16 RX ORDER — HYDROCODONE BITARTRATE AND ACETAMINOPHEN 10; 325 MG/1; MG/1
1 TABLET ORAL EVERY 12 HOURS PRN
Qty: 60 TABLET | Refills: 0 | Status: SHIPPED | OUTPATIENT
Start: 2024-10-20 | End: 2024-11-19

## 2024-11-18 ENCOUNTER — PATIENT MESSAGE (OUTPATIENT)
Dept: PAIN MEDICINE | Facility: CLINIC | Age: 79
End: 2024-11-18
Payer: MEDICARE

## 2024-11-18 ENCOUNTER — TELEPHONE (OUTPATIENT)
Dept: PAIN MEDICINE | Facility: CLINIC | Age: 79
End: 2024-11-18
Payer: MEDICARE

## 2024-11-19 ENCOUNTER — PATIENT MESSAGE (OUTPATIENT)
Dept: PAIN MEDICINE | Facility: CLINIC | Age: 79
End: 2024-11-19
Payer: MEDICARE

## 2024-11-19 DIAGNOSIS — M54.50 CHRONIC BILATERAL LOW BACK PAIN WITHOUT SCIATICA: ICD-10-CM

## 2024-11-19 DIAGNOSIS — Z02.89 PAIN MANAGEMENT CONTRACT SIGNED: ICD-10-CM

## 2024-11-19 DIAGNOSIS — G89.29 CHRONIC BILATERAL LOW BACK PAIN WITHOUT SCIATICA: ICD-10-CM

## 2024-11-19 RX ORDER — HYDROCODONE BITARTRATE AND ACETAMINOPHEN 10; 325 MG/1; MG/1
1 TABLET ORAL EVERY 12 HOURS PRN
Qty: 60 TABLET | Refills: 0 | Status: SHIPPED | OUTPATIENT
Start: 2024-11-19 | End: 2024-12-19

## 2024-11-21 PROBLEM — E66.9 OBESITY (BMI 35.0-39.9 WITHOUT COMORBIDITY): Status: ACTIVE | Noted: 2022-07-25

## 2024-12-05 ENCOUNTER — HOSPITAL ENCOUNTER (OUTPATIENT)
Dept: RADIOLOGY | Facility: HOSPITAL | Age: 79
Discharge: HOME | End: 2024-12-05
Attending: INTERNAL MEDICINE
Payer: MEDICARE

## 2024-12-05 DIAGNOSIS — R93.89 ABNORMAL CHEST CT: ICD-10-CM

## 2024-12-05 PROCEDURE — 71250 CT THORAX DX C-: CPT | Mod: 26,,, | Performed by: RADIOLOGY

## 2024-12-05 PROCEDURE — 71250 CT THORAX DX C-: CPT | Mod: TC,PO

## 2024-12-06 ENCOUNTER — OFFICE VISIT (OUTPATIENT)
Dept: PAIN MEDICINE | Facility: CLINIC | Age: 79
End: 2024-12-06
Payer: MEDICARE

## 2024-12-06 VITALS
WEIGHT: 223.69 LBS | DIASTOLIC BLOOD PRESSURE: 75 MMHG | HEART RATE: 69 BPM | SYSTOLIC BLOOD PRESSURE: 131 MMHG | BODY MASS INDEX: 34.01 KG/M2

## 2024-12-06 DIAGNOSIS — G89.29 CHRONIC PAIN OF RIGHT KNEE: ICD-10-CM

## 2024-12-06 DIAGNOSIS — M54.9 DORSALGIA: ICD-10-CM

## 2024-12-06 DIAGNOSIS — M25.561 CHRONIC PAIN OF RIGHT KNEE: ICD-10-CM

## 2024-12-06 DIAGNOSIS — M47.816 LUMBAR SPONDYLOSIS: ICD-10-CM

## 2024-12-06 DIAGNOSIS — M51.360 DEGENERATION OF INTERVERTEBRAL DISC OF LUMBAR REGION WITH DISCOGENIC BACK PAIN: ICD-10-CM

## 2024-12-06 DIAGNOSIS — M17.11 OSTEOARTHRITIS OF RIGHT KNEE, UNSPECIFIED OSTEOARTHRITIS TYPE: ICD-10-CM

## 2024-12-06 DIAGNOSIS — Z02.89 PAIN MANAGEMENT CONTRACT SIGNED: ICD-10-CM

## 2024-12-06 DIAGNOSIS — Z02.89 PAIN MANAGEMENT CONTRACT SIGNED: Primary | ICD-10-CM

## 2024-12-06 DIAGNOSIS — G89.4 CHRONIC PAIN DISORDER: ICD-10-CM

## 2024-12-06 DIAGNOSIS — G89.29 CHRONIC BILATERAL LOW BACK PAIN WITHOUT SCIATICA: ICD-10-CM

## 2024-12-06 DIAGNOSIS — M54.50 CHRONIC BILATERAL LOW BACK PAIN WITHOUT SCIATICA: ICD-10-CM

## 2024-12-06 PROCEDURE — 99999 PR PBB SHADOW E&M-EST. PATIENT-LVL II: CPT | Mod: PBBFAC,,,

## 2024-12-06 PROCEDURE — 80307 DRUG TEST PRSMV CHEM ANLYZR: CPT

## 2024-12-06 RX ORDER — HYDROCODONE BITARTRATE AND ACETAMINOPHEN 10; 325 MG/1; MG/1
1 TABLET ORAL EVERY 12 HOURS PRN
Qty: 60 TABLET | Refills: 0 | Status: SHIPPED | OUTPATIENT
Start: 2024-12-19 | End: 2025-01-18

## 2024-12-06 NOTE — PROGRESS NOTES
[facility] Follow Up Evaluation    Referred by: Dr. Lerner  Reason for referral: back and neck pain    CC:   Chief Complaint   Patient presents with    Knee Pain    Shoulder Pain            12/6/2024     9:15 AM 9/6/2024     2:21 PM 6/3/2024     7:53 AM   Last 3 PDI Scores   Pain Disability Index (PDI) 36 17 38       Interval HPI 12/6/2024: Jay Han returns to the office for follow up.  He returns for follow-up with continued lower back pain and bilateral knee pain.  He feels like his knee pain continues to steadily worsened.  He was scheduled for Iovera but postponed.  He is going to re-evaluate after the new year.  He denies any new numbness, weakness or any new changes to his bowel or bladder function.  He continues to take hydrocodone 10/325 mg up to 2 times a day with added relief and no ill side effects or adverse events.      Initial HPI:   Jay Han is a 79 y.o. male who complains of back and right knee pain.  He's had this pain chronically for many years.  Has been taking hydrocodone as prescribed by his PCP.  Takes care of his disabled daughter at home.  Today his pain is 9/10, constant, sharp, aching, burning.  No numbness or weakness.  Pain can go down the right leg at times.    History:    Current Outpatient Medications:     amLODIPine (NORVASC) 10 MG tablet, TAKE 1 TABLET EVERY DAY, Disp: 90 tablet, Rfl: 3    ascorbic acid, vitamin C, (VITAMIN C) 1000 MG tablet, Take 1,000 mg by mouth once daily., Disp: , Rfl:     CALCIUM ORAL, Take 1,200 mg by mouth once daily., Disp: , Rfl:     cholecalciferol, vitamin D3, (VITAMIN D3 ORAL), Take 125 mcg by mouth once daily., Disp: , Rfl:     ELIQUIS 5 mg Tab, TAKE 1 TABLET TWICE DAILY, Disp: 180 tablet, Rfl: 1    enalapril (VASOTEC) 20 MG tablet, TAKE 2 TABLETS EVERY DAY, Disp: 180 tablet, Rfl: 3    furosemide (LASIX) 20 MG tablet, 1 tablet daily, take with potassium, Disp: 90 tablet, Rfl: 1    HYDROcodone-acetaminophen (NORCO)  mg per  tablet, Take 1 tablet by mouth every 12 (twelve) hours as needed for Pain., Disp: 60 tablet, Rfl: 0    metoprolol tartrate (LOPRESSOR) 25 MG tablet, TAKE ONE-HALF TABLET BY MOUTH TWICE DAILY, Disp: 90 tablet, Rfl: 0    montelukast (SINGULAIR) 10 mg tablet, Take 1 tablet (10 mg total) by mouth every evening., Disp: 90 tablet, Rfl: 3    potassium chloride SA (K-DUR,KLOR-CON M) 10 MEQ tablet, Take 1 tablet (10 mEq total) by mouth once daily., Disp: 90 tablet, Rfl: 1    pravastatin (PRAVACHOL) 40 MG tablet, TAKE ONE TABLET BY MOUTH ONCE DAILY, Disp: 90 tablet, Rfl: 0    Past Medical History:   Diagnosis Date    Arthritis     Coronary artery disease     Dyslipidemia 01/14/2016    ED (erectile dysfunction)     Essential hypertension 01/14/2016    Essential hypertension, benign     H/O asbestos exposure     Hearing aid worn     History of chicken pox     History of wheezing     Hx of sarcoidosis     Hyperglycemia     Knee pain, chronic     right    Lipoprotein deficiencies     Lumbago     Lumbar herniated disc     Nonsustained ventricular tachycardia 01/14/2016    Other and unspecified hyperlipidemia     Other malaise and fatigue     Personal history of contact with and (suspected) exposure to asbestos     Presence of stent in LAD coronary artery 02/11/2016    Presence of stent in left circumflex coronary artery 03/10/2016    Pure hyperglyceridemia     S/P coronary artery stent placement 03/10/2016    LAD, LCX    Shoulder pain     left shoulder    Ventricular premature complexes 01/14/2016       Past Surgical History:   Procedure Laterality Date    CARDIAC PACEMAKER PLACEMENT      CHOLECYSTECTOMY      COLONOSCOPY N/A 10/26/2021    Dr. Kaur    CORONARY STENT PLACEMENT  01/2016    INSERTION OF PACEMAKER  2018    LUNG BIOPSY      benign       Family History   Problem Relation Name Age of Onset    Hypertension Mother      Hypertension Maternal Grandmother      Hypertension Maternal Grandfather      Kidney disease Father       "Heart disease Maternal Uncle          heart attack, smoker    Glaucoma Neg Hx         Social History     Socioeconomic History    Marital status: Single   Tobacco Use    Smoking status: Never     Passive exposure: Past    Smokeless tobacco: Never   Substance and Sexual Activity    Alcohol use: Yes     Alcohol/week: 1.0 standard drink of alcohol     Types: 1 Cans of beer per week     Comment: "very moderate"     Drug use: No    Sexual activity: Not Currently     Social Drivers of Health     Financial Resource Strain: Medium Risk (8/31/2024)    Overall Financial Resource Strain (CARDIA)     Difficulty of Paying Living Expenses: Somewhat hard   Food Insecurity: Patient Declined (8/31/2024)    Hunger Vital Sign     Worried About Running Out of Food in the Last Year: Patient declined     Ran Out of Food in the Last Year: Patient declined   Transportation Needs: No Transportation Needs (11/8/2023)    PRAPARE - Transportation     Lack of Transportation (Medical): No     Lack of Transportation (Non-Medical): No   Physical Activity: Inactive (8/31/2024)    Exercise Vital Sign     Days of Exercise per Week: 0 days     Minutes of Exercise per Session: 10 min   Stress: No Stress Concern Present (8/31/2024)    [employer] [place] [employer] - Occupational Stress Questionnaire     Feeling of Stress : Only a little   Housing Stability: Unknown (8/31/2024)    Housing Stability Vital Sign     Unable to Pay for Housing in the Last Year: No       Review of patient's allergies indicates:   Allergen Reactions    Amiodarone analogues      CT Chest / Lungs        Review of Systems:  General ROS: negative for - fever  Psychological ROS: negative for - hostility  Hematological and Lymphatic ROS: negative for - bleeding problems  Endocrine ROS: negative for - unexpected weight changes  Respiratory ROS: no cough, shortness of breath, or wheezing  Cardiovascular ROS: no chest pain or dyspnea on exertion  Gastrointestinal ROS: no " abdominal pain, change in bowel habits, or black or bloody stools  Musculoskeletal ROS: negative for - muscular weakness  Neurological ROS: negative for - numbness/tingling  Dermatological ROS: negative for rash    Physical Exam:  Vitals:    12/06/24 0916   BP: 131/75   Pulse: 69   Weight: 101.5 kg (223 lb 10.5 oz)   PainSc:   6   PainLoc: Back       Body mass index is 34.01 kg/m².     Gen: NAD  Gait:  Ambulates with cane  Psych:  Mood appropriate for given condition  HEENT: eyes anicteric   GI: Abd soft  CV: RRR  Lungs: breathing unlabored   ROM: limited AROM of the L spine in all planes, full ROM at ankles, knees and hips        Imaging:  Xray lumbar spine 11/18/21  FINDINGS:  There is 4 mm anterolisthesis L2 on L3 which persists with flexion and extension position.  There is levoscoliosis centered at L3.  No pars defects.  No displaced fracture with preserved vertebral body heights.  Bulky partially bridging osteophytes at several lower thoracic levels and several lower lumbar levels most notably about L5.  Degenerative disc disease mild at L2-3 and moderate at L3-4 through L5-S1.  Facet degenerative change lowest 4 lumbar levels.  Cholecystectomy clips.  Atherosclerosis.  Partially imaged transvenous pacing wire.    Xray right knee 11/18/21  FINDINGS:  Marked medial joint space narrowing is noted bilaterally.  There is moderate tricompartmental osteophyte formation.  There is a joint effusion on the right.  I do not see evidence of acute fracture.    X-ray lumbar spine 5/27/2022  FINDINGS:  There is a 12 degree levocurvature of the lumbar spine with associated rotatory component.  There is mild grade 1 retrolisthesis of L3 on L4.  There is loss of normal lordosis.  Alignment is otherwise within normal limits.  There is loss of disc height at L4/5, L5/S1 and to a lesser degree at L3/4.  There is associated anterior spondylosis, posterior spondylosis, sclerotic discogenic endplate changes and marked facet  arthropathy.  These findings are most pronounced at L5/S1.  There is no displaced fracture or dislocation.     Vertebral body heights are preserved.  Arterial calcifications are present.     Impression:     1. Advanced multilevel disc and joint disease.    Labs:  BMP  Lab Results   Component Value Date     12/05/2024    K 3.9 12/05/2024     12/05/2024    CO2 29 12/05/2024    BUN 13 12/05/2024    CREATININE 0.76 12/05/2024    CALCIUM 10.1 12/05/2024    ANIONGAP 7 (L) 12/05/2024    ESTGFRAFRICA >60 07/28/2022    EGFRNONAA >60 07/28/2022     Lab Results   Component Value Date    ALT 11 12/05/2024    AST 18 12/05/2024    ALKPHOS 105 12/05/2024    BILITOT 0.7 12/05/2024       Assessment:   Problem List Items Addressed This Visit       Chronic knee pain     Other Visit Diagnoses       Pain management contract signed    -  Primary    Chronic pain disorder        Lumbar spondylosis        Relevant Orders    Pain Clinic Drug Screen    Osteoarthritis of right knee, unspecified osteoarthritis type        Dorsalgia        Degeneration of intervertebral disc of lumbar region with discogenic back pain                  79 y.o. year old male with PMH CAD s/p stents, a-fib, MORRIS, HTN who complains of back and right knee pain.  He's had this pain chronically for many years.  Has been taking hydrocodone as prescribed by his PCP.  Takes care of his disabled daughter at home.  Today his pain is 9/10, constant, sharp, aching, burning.  No numbness or weakness.  Pain can go down the right leg at times.    12/6/2024: Jay Han returns to the office for follow up.  He returns for follow-up with continued lower back pain and bilateral knee pain.  He feels like his knee pain continues to steadily worsened.  He was scheduled for Iovera but postponed.  He is going to re-evaluate after the new year.  He denies any new numbness, weakness or any new changes to his bowel or bladder function.  He continues to take hydrocodone 10/325  mg up to 2 times a day with added relief and no ill side effects or adverse events.      - at baseline for pain with no new or worsening symptoms.  - at this time we will continue to maximize conservative therapy with rest and medications.   - he will follow up with PM&R after the new year for Dimasvera.   - refill for hydrocodone-acetaminophen 10/325 mg for up to 2 times daily sent to Dr. Marquis today for approval.  -  No signs of abuse, no adverse events noted, patient experiences significant benefit of analgesia, and patient demonstrates increased activity while on these medications.  The patient is meeting the goals of opioid therapy and is dependent on them for functionality and can not perform ADLS without them. Regarding opioids, the risks were discussed including tolerance, addiction, overdose, over-sedation, drug interactions, respiratory depression, opioid-induced hyperalgesia, and even death.   UDS 7/19/23 consistent   - UDS collected today  - follow up in 3 months or sooner if needed      : Reviewed and consistent with medication use as prescribed.    The above note was completed, in part, with the aid of Dragon dictation software/hardware. Translation errors may be present.

## 2024-12-12 ENCOUNTER — TELEPHONE (OUTPATIENT)
Dept: PAIN MEDICINE | Facility: CLINIC | Age: 79
End: 2024-12-12
Payer: MEDICARE

## 2024-12-17 ENCOUNTER — PATIENT MESSAGE (OUTPATIENT)
Dept: PAIN MEDICINE | Facility: CLINIC | Age: 79
End: 2024-12-17
Payer: MEDICARE

## 2025-01-14 ENCOUNTER — HOSPITAL ENCOUNTER (OUTPATIENT)
Dept: RADIOLOGY | Facility: HOSPITAL | Age: 80
Discharge: HOME | End: 2025-01-14
Attending: INTERNAL MEDICINE
Payer: MEDICARE

## 2025-01-14 DIAGNOSIS — R91.1 INCIDENTAL LUNG NODULE, GREATER THAN OR EQUAL TO 8MM: ICD-10-CM

## 2025-01-14 LAB — GLUCOSE SERPL-MCNC: 106 MG/DL (ref 70–110)

## 2025-01-14 PROCEDURE — A9552 F18 FDG: HCPCS | Mod: PN | Performed by: INTERNAL MEDICINE

## 2025-01-14 PROCEDURE — 78815 PET IMAGE W/CT SKULL-THIGH: CPT | Mod: TC,PN

## 2025-01-14 PROCEDURE — 78815 PET IMAGE W/CT SKULL-THIGH: CPT | Mod: 26,PI,, | Performed by: RADIOLOGY

## 2025-01-14 RX ORDER — FLUDEOXYGLUCOSE F18 500 MCI/ML
12 INJECTION INTRAVENOUS
Status: COMPLETED | OUTPATIENT
Start: 2025-01-14 | End: 2025-01-14

## 2025-01-14 RX ADMIN — FLUDEOXYGLUCOSE F-18 12.9 MILLICURIE: 500 INJECTION INTRAVENOUS at 10:01

## 2025-01-14 NOTE — PROGRESS NOTES
PET Imaging Questionnaire    Are you a Diabetic? Recent Blood Sugar level? No    Are you anemic? Bone Marrow Stimulation Meds? No    Have you had a CT Scan, if so when & where was your last one? Yes -     Have you had a PET Scan, if so when & where was your last one? Yes -     Chemotherapy or currently on Chemotherapy? No    Radiation therapy? No    Surgical History:   Past Surgical History:   Procedure Laterality Date    CARDIAC PACEMAKER PLACEMENT      CHOLECYSTECTOMY      COLONOSCOPY N/A 10/26/2021    Dr. Kaur    CORONARY STENT PLACEMENT  01/2016    INSERTION OF PACEMAKER  2018    LUNG BIOPSY      benign        Have you been fasting for at least 6 hours? Yes    Is there any chance you may be pregnant or breastfeeding? No    Assay: 14.4 MCi@:1006   Injection Site:rt arm     Residual: 1.53 mCi@: 1008   Technologist: Evelyn Steward Injected:12.9mCi

## 2025-01-17 DIAGNOSIS — M54.50 CHRONIC BILATERAL LOW BACK PAIN WITHOUT SCIATICA: ICD-10-CM

## 2025-01-17 DIAGNOSIS — G89.29 CHRONIC BILATERAL LOW BACK PAIN WITHOUT SCIATICA: ICD-10-CM

## 2025-01-17 DIAGNOSIS — Z02.89 PAIN MANAGEMENT CONTRACT SIGNED: ICD-10-CM

## 2025-01-17 RX ORDER — HYDROCODONE BITARTRATE AND ACETAMINOPHEN 10; 325 MG/1; MG/1
1 TABLET ORAL EVERY 12 HOURS PRN
Qty: 60 TABLET | Refills: 0 | Status: SHIPPED | OUTPATIENT
Start: 2025-01-17 | End: 2025-02-16

## 2025-02-17 ENCOUNTER — PATIENT MESSAGE (OUTPATIENT)
Dept: PAIN MEDICINE | Facility: CLINIC | Age: 80
End: 2025-02-17
Payer: MEDICARE

## 2025-02-17 DIAGNOSIS — M54.50 CHRONIC BILATERAL LOW BACK PAIN WITHOUT SCIATICA: ICD-10-CM

## 2025-02-17 DIAGNOSIS — G89.29 CHRONIC BILATERAL LOW BACK PAIN WITHOUT SCIATICA: ICD-10-CM

## 2025-02-17 DIAGNOSIS — Z02.89 PAIN MANAGEMENT CONTRACT SIGNED: ICD-10-CM

## 2025-02-17 RX ORDER — HYDROCODONE BITARTRATE AND ACETAMINOPHEN 10; 325 MG/1; MG/1
1 TABLET ORAL EVERY 12 HOURS PRN
Qty: 60 TABLET | Refills: 0 | Status: SHIPPED | OUTPATIENT
Start: 2025-02-17 | End: 2025-03-19

## 2025-03-06 ENCOUNTER — OFFICE VISIT (OUTPATIENT)
Dept: PAIN MEDICINE | Facility: CLINIC | Age: 80
End: 2025-03-06
Payer: MEDICARE

## 2025-03-06 VITALS
HEART RATE: 62 BPM | DIASTOLIC BLOOD PRESSURE: 64 MMHG | BODY MASS INDEX: 37.22 KG/M2 | SYSTOLIC BLOOD PRESSURE: 135 MMHG | WEIGHT: 244.81 LBS

## 2025-03-06 DIAGNOSIS — M25.561 CHRONIC PAIN OF RIGHT KNEE: Primary | ICD-10-CM

## 2025-03-06 DIAGNOSIS — M54.50 CHRONIC BILATERAL LOW BACK PAIN WITHOUT SCIATICA: ICD-10-CM

## 2025-03-06 DIAGNOSIS — G89.29 CHRONIC LEFT SHOULDER PAIN: ICD-10-CM

## 2025-03-06 DIAGNOSIS — M25.512 CHRONIC LEFT SHOULDER PAIN: ICD-10-CM

## 2025-03-06 DIAGNOSIS — Z02.89 PAIN MANAGEMENT CONTRACT SIGNED: ICD-10-CM

## 2025-03-06 DIAGNOSIS — G89.29 CHRONIC BILATERAL LOW BACK PAIN WITHOUT SCIATICA: ICD-10-CM

## 2025-03-06 DIAGNOSIS — G89.29 CHRONIC PAIN OF RIGHT KNEE: Primary | ICD-10-CM

## 2025-03-06 DIAGNOSIS — M47.816 LUMBAR SPONDYLOSIS: ICD-10-CM

## 2025-03-06 PROCEDURE — 1125F AMNT PAIN NOTED PAIN PRSNT: CPT | Mod: CPTII,S$GLB,, | Performed by: PHYSICIAN ASSISTANT

## 2025-03-06 PROCEDURE — 3288F FALL RISK ASSESSMENT DOCD: CPT | Mod: CPTII,S$GLB,, | Performed by: PHYSICIAN ASSISTANT

## 2025-03-06 PROCEDURE — 99999 PR PBB SHADOW E&M-EST. PATIENT-LVL III: CPT | Mod: PBBFAC,,, | Performed by: PHYSICIAN ASSISTANT

## 2025-03-06 PROCEDURE — 3078F DIAST BP <80 MM HG: CPT | Mod: CPTII,S$GLB,, | Performed by: PHYSICIAN ASSISTANT

## 2025-03-06 PROCEDURE — 1159F MED LIST DOCD IN RCRD: CPT | Mod: CPTII,S$GLB,, | Performed by: PHYSICIAN ASSISTANT

## 2025-03-06 PROCEDURE — 1160F RVW MEDS BY RX/DR IN RCRD: CPT | Mod: CPTII,S$GLB,, | Performed by: PHYSICIAN ASSISTANT

## 2025-03-06 PROCEDURE — 99214 OFFICE O/P EST MOD 30 MIN: CPT | Mod: S$GLB,,, | Performed by: PHYSICIAN ASSISTANT

## 2025-03-06 PROCEDURE — 3075F SYST BP GE 130 - 139MM HG: CPT | Mod: CPTII,S$GLB,, | Performed by: PHYSICIAN ASSISTANT

## 2025-03-06 PROCEDURE — 1101F PT FALLS ASSESS-DOCD LE1/YR: CPT | Mod: CPTII,S$GLB,, | Performed by: PHYSICIAN ASSISTANT

## 2025-03-06 RX ORDER — HYDROCODONE BITARTRATE AND ACETAMINOPHEN 10; 325 MG/1; MG/1
1 TABLET ORAL EVERY 12 HOURS PRN
Qty: 60 TABLET | Refills: 0 | Status: SHIPPED | OUTPATIENT
Start: 2025-03-19 | End: 2025-04-18

## 2025-03-06 NOTE — PROGRESS NOTES
[facility] Follow Up Evaluation    Referred by: Dr. Lerner  Reason for referral: back and neck pain    CC:   Chief Complaint   Patient presents with    Knee Pain            3/6/2025     8:17 AM 12/6/2024     9:15 AM 9/6/2024     2:21 PM   Last 3 PDI Scores   Pain Disability Index (PDI) 32 [phone] 17       Interval HPI 3/6/2025: Jay Han returns to the office for follow up.  He is no longer having pain in the left buttock and thigh.  His main complaints are left shoulder pain and right greater than left knee pain.  He continues to take pain medication with adequate relief of his symptoms.  He denies any new weakness or numbness and has no new concerns today.    Initial HPI:   Jay Han is a 79 y.o. male who complains of back and right knee pain.  He's had this pain chronically for many years.  Has been taking hydrocodone as prescribed by his PCP.  Takes care of his disabled daughter at home.  Today his pain is 9/10, constant, sharp, aching, burning.  No numbness or weakness.  Pain can go down the right leg at times.    History:    Current Outpatient Medications:     ALPRAZolam (XANAX) 0.5 MG tablet, Take 1 tablet (0.5 mg total) by mouth 1 (one) time if needed for Anxiety., Disp: 2 tablet, Rfl: 0    amLODIPine (NORVASC) 10 MG tablet, TAKE 1 TABLET EVERY DAY, Disp: 90 tablet, Rfl: 3    ascorbic acid, vitamin C, (VITAMIN C) 1000 MG tablet, Take 1,000 mg by mouth once daily., Disp: , Rfl:     CALCIUM ORAL, Take 1,200 mg by mouth once daily., Disp: , Rfl:     cholecalciferol, vitamin D3, (VITAMIN D3 ORAL), Take 125 mcg by mouth once daily., Disp: , Rfl:     ELIQUIS 5 mg Tab, TAKE 1 TABLET TWICE DAILY, Disp: 180 tablet, Rfl: 1    enalapril (VASOTEC) 20 MG tablet, TAKE 2 TABLETS EVERY DAY, Disp: 180 tablet, Rfl: 3    furosemide (LASIX) 20 MG tablet, 1 tablet daily, take with potassium, Disp: 90 tablet, Rfl: 1    montelukast (SINGULAIR) 10 mg tablet, Take 1 tablet (10 mg total) by mouth every evening.,  Disp: 90 tablet, Rfl: 3    pravastatin (PRAVACHOL) 40 MG tablet, TAKE 1 TABLET EVERY EVENING, Disp: 90 tablet, Rfl: 3    [START ON 3/19/2025] HYDROcodone-acetaminophen (NORCO)  mg per tablet, Take 1 tablet by mouth every 12 (twelve) hours as needed for Pain., Disp: 60 tablet, Rfl: 0    metoprolol tartrate (LOPRESSOR) 25 MG tablet, TAKE 1/2 TABLET TWICE DAILY, Disp: 90 tablet, Rfl: 3    Past Medical History:   Diagnosis Date    Arthritis     Coronary artery disease     Dyslipidemia 01/14/2016    ED (erectile dysfunction)     Essential hypertension 01/14/2016    Essential hypertension, benign     H/O asbestos exposure     Hearing aid worn     History of chicken pox     History of wheezing     Hx of sarcoidosis     Hyperglycemia     Knee pain, chronic     right    Lipoprotein deficiencies     Lumbago     Lumbar herniated disc     Nonsustained ventricular tachycardia 01/14/2016    Other and unspecified hyperlipidemia     Other malaise and fatigue     Personal history of contact with and (suspected) exposure to asbestos     Presence of stent in LAD coronary artery 02/11/2016    Presence of stent in left circumflex coronary artery 03/10/2016    Pure hyperglyceridemia     S/P coronary artery stent placement 03/10/2016    LAD, LCX    Shoulder pain     left shoulder    Ventricular premature complexes 01/14/2016       Past Surgical History:   Procedure Laterality Date    CARDIAC PACEMAKER PLACEMENT      CHOLECYSTECTOMY      COLONOSCOPY N/A 10/26/2021    Dr. Kaur    CORONARY STENT PLACEMENT  01/2016    INSERTION OF PACEMAKER  2018    LUNG BIOPSY      benign       Family History   Problem Relation Name Age of Onset    Hypertension Mother      Hypertension Maternal Grandmother      Hypertension Maternal Grandfather      Kidney disease Father      Heart disease Maternal Uncle          heart attack, smoker    Glaucoma Neg Hx         Social History     Socioeconomic History    Marital status: Single   Tobacco Use    Smoking  "status: Never     Passive exposure: Past    Smokeless tobacco: Never   Substance and Sexual Activity    Alcohol use: Yes     Alcohol/week: 1.0 standard drink of alcohol     Types: 1 Cans of beer per week     Comment: "very moderate"     Drug use: No    Sexual activity: Not Currently     Social Drivers of Health     Financial Resource Strain: Medium Risk (8/31/2024)    Overall Financial Resource Strain (CARDIA)     Difficulty of Paying Living Expenses: Somewhat hard   Food Insecurity: Patient Declined (8/31/2024)    Hunger Vital Sign     Worried About Running Out of Food in the Last Year: Patient declined     Ran Out of Food in the Last Year: Patient declined   Transportation Needs: No Transportation Needs (11/8/2023)    PRAPARE - Transportation     Lack of Transportation (Medical): No     Lack of Transportation (Non-Medical): No   Physical Activity: Inactive (8/31/2024)    Exercise Vital Sign     Days of Exercise per Week: 0 days     Minutes of Exercise per Session: 10 min   Stress: No Stress Concern Present (8/31/2024)    [employer] - Occupational Stress Questionnaire     Feeling of Stress : Only a little   Housing Stability: Unknown (8/31/2024)    Housing Stability Vital Sign     Unable to Pay for Housing in the Last Year: No       Review of patient's allergies indicates:   Allergen Reactions    Amiodarone analogues      CT Chest / Lungs        Review of Systems:  General ROS: negative for - fever  Psychological ROS: negative for - hostility  Hematological and Lymphatic ROS: negative for - bleeding problems  Endocrine ROS: negative for - unexpected weight changes  Respiratory ROS: no cough, shortness of breath, or wheezing  Cardiovascular ROS: no chest pain or dyspnea on exertion  Gastrointestinal ROS: no abdominal pain, change in bowel habits, or black or bloody stools  Musculoskeletal ROS: negative for - muscular weakness  Neurological ROS: negative for - " numbness/tingling  Dermatological ROS: negative for rash    Physical Exam:  Vitals:    03/06/25 0818   BP: 135/64   Pulse: 62   Weight: 111 kg (244 lb 13.1 oz)   PainSc:   6   PainLoc: Knee       Body mass index is 37.22 kg/m².     Gen: NAD  Gait:  Ambulates with cane  Psych:  Mood appropriate for given condition  HEENT: eyes anicteric   GI: Abd soft  CV: RRR  Lungs: breathing unlabored   ROM: limited AROM of the L spine in all planes, full ROM at ankles, knees and hips        Imaging:  Xray lumbar spine 11/18/21  FINDINGS:  There is 4 mm anterolisthesis L2 on L3 which persists with flexion and extension position.  There is levoscoliosis centered at L3.  No pars defects.  No displaced fracture with preserved vertebral body heights.  Bulky partially bridging osteophytes at several lower thoracic levels and several lower lumbar levels most notably about L5.  Degenerative disc disease mild at L2-3 and moderate at L3-4 through L5-S1.  Facet degenerative change lowest 4 lumbar levels.  Cholecystectomy clips.  Atherosclerosis.  Partially imaged transvenous pacing wire.    Xray right knee 11/18/21  FINDINGS:  Marked medial joint space narrowing is noted bilaterally.  There is moderate tricompartmental osteophyte formation.  There is a joint effusion on the right.  I do not see evidence of acute fracture.    X-ray lumbar spine 5/27/2022  FINDINGS:  There is a 12 degree levocurvature of the lumbar spine with associated rotatory component.  There is mild grade 1 retrolisthesis of L3 on L4.  There is loss of normal lordosis.  Alignment is otherwise within normal limits.  There is loss of disc height at L4/5, L5/S1 and to a lesser degree at L3/4.  There is associated anterior spondylosis, posterior spondylosis, sclerotic discogenic endplate changes and marked facet arthropathy.  These findings are most pronounced at L5/S1.  There is no displaced fracture or dislocation.     Vertebral body heights are preserved.  Arterial  calcifications are present.     Impression:     1. Advanced multilevel disc and joint disease.    Labs:  BMP  Lab Results   Component Value Date     12/05/2024    K 3.9 12/05/2024     12/05/2024    CO2 29 12/05/2024    BUN 13 12/05/2024    CREATININE 0.76 12/05/2024    CALCIUM 10.1 12/05/2024    ANIONGAP 7 (L) 12/05/2024    ESTGFRAFRICA >60 07/28/2022    EGFRNONAA >60 07/28/2022     Lab Results   Component Value Date    ALT 11 12/05/2024    AST 18 12/05/2024    ALKPHOS 105 12/05/2024    BILITOT 0.7 12/05/2024       Assessment:   Problem List Items Addressed This Visit       Chronic knee pain - Primary    Chronic left shoulder pain     Other Visit Diagnoses         Lumbar spondylosis          Pain management contract signed                  79 y.o. year old male with PMH CAD s/p stents, a-fib, MORRIS, HTN who complains of back and right knee pain.  He's had this pain chronically for many years.  Has been taking hydrocodone as prescribed by his PCP.  Takes care of his disabled daughter at home.  Today his pain is 9/10, constant, sharp, aching, burning.  No numbness or weakness.  Pain can go down the right leg at times.    3/6/2025: Jay Han returns to the office for follow up.  He is no longer having pain in the left buttock and thigh.  His main complaints are left shoulder pain and right greater than left knee pain.  He continues to take pain medication with adequate relief of his symptoms.  He denies any new weakness or numbness and has no new concerns today.      - at baseline for pain with no new or worsening symptoms.  - at this time we will continue to maximize conservative therapy with rest and medications.   - we discussed follow-up with orthopedics to discuss knee surgery.  He is considering this but is concerned about the recovery because he has to care for his daughter.  - refill for hydrocodone-acetaminophen 10/325 mg for up to 2 times daily sent to Dr. Marquis today for approval.  -  No signs  of abuse, no adverse events noted, patient experiences significant benefit of analgesia, and patient demonstrates increased activity while on these medications.  The patient is meeting the goals of opioid therapy and is dependent on them for functionality and can not perform ADLS without them. Regarding opioids, the risks were discussed including tolerance, addiction, overdose, over-sedation, drug interactions, respiratory depression, opioid-induced hyperalgesia, and even death.     - follow up in 3 months or sooner if needed      : Reviewed and consistent with medication use as prescribed.    The above note was completed, in part, with the aid of Dragon dictation software/hardware. Translation errors may be present.

## 2025-04-15 ENCOUNTER — PATIENT MESSAGE (OUTPATIENT)
Dept: PAIN MEDICINE | Facility: CLINIC | Age: 80
End: 2025-04-15
Payer: MEDICARE

## 2025-04-15 DIAGNOSIS — Z02.89 PAIN MANAGEMENT CONTRACT SIGNED: ICD-10-CM

## 2025-04-15 DIAGNOSIS — M54.50 CHRONIC BILATERAL LOW BACK PAIN WITHOUT SCIATICA: ICD-10-CM

## 2025-04-15 DIAGNOSIS — G89.29 CHRONIC BILATERAL LOW BACK PAIN WITHOUT SCIATICA: ICD-10-CM

## 2025-04-16 RX ORDER — HYDROCODONE BITARTRATE AND ACETAMINOPHEN 10; 325 MG/1; MG/1
1 TABLET ORAL EVERY 12 HOURS PRN
Qty: 60 TABLET | Refills: 0 | Status: SHIPPED | OUTPATIENT
Start: 2025-04-18 | End: 2025-05-18

## 2025-04-22 PROBLEM — E11.9 CONTROLLED TYPE 2 DIABETES MELLITUS: Status: ACTIVE | Noted: 2025-04-22

## 2025-04-22 PROBLEM — I21.4 NSTEMI (NON-ST ELEVATED MYOCARDIAL INFARCTION): Status: ACTIVE | Noted: 2025-04-22

## 2025-05-19 ENCOUNTER — PATIENT MESSAGE (OUTPATIENT)
Dept: PAIN MEDICINE | Facility: CLINIC | Age: 80
End: 2025-05-19
Payer: MEDICARE

## 2025-05-19 DIAGNOSIS — M54.50 CHRONIC BILATERAL LOW BACK PAIN WITHOUT SCIATICA: ICD-10-CM

## 2025-05-19 DIAGNOSIS — G89.29 CHRONIC BILATERAL LOW BACK PAIN WITHOUT SCIATICA: ICD-10-CM

## 2025-05-19 DIAGNOSIS — Z02.89 PAIN MANAGEMENT CONTRACT SIGNED: ICD-10-CM

## 2025-05-19 RX ORDER — HYDROCODONE BITARTRATE AND ACETAMINOPHEN 10; 325 MG/1; MG/1
1 TABLET ORAL EVERY 12 HOURS PRN
Qty: 60 TABLET | Refills: 0 | Status: SHIPPED | OUTPATIENT
Start: 2025-05-19 | End: 2025-06-18

## 2025-06-06 ENCOUNTER — OFFICE VISIT (OUTPATIENT)
Dept: PAIN MEDICINE | Facility: CLINIC | Age: 80
End: 2025-06-06
Payer: MEDICARE

## 2025-06-06 VITALS
BODY MASS INDEX: 32.67 KG/M2 | WEIGHT: 221.25 LBS | DIASTOLIC BLOOD PRESSURE: 61 MMHG | SYSTOLIC BLOOD PRESSURE: 111 MMHG | HEART RATE: 60 BPM

## 2025-06-06 DIAGNOSIS — M25.561 CHRONIC PAIN OF RIGHT KNEE: Primary | ICD-10-CM

## 2025-06-06 DIAGNOSIS — G89.29 CHRONIC PAIN OF RIGHT KNEE: Primary | ICD-10-CM

## 2025-06-06 DIAGNOSIS — M54.50 CHRONIC BILATERAL LOW BACK PAIN WITHOUT SCIATICA: ICD-10-CM

## 2025-06-06 DIAGNOSIS — Z02.89 PAIN MANAGEMENT CONTRACT SIGNED: ICD-10-CM

## 2025-06-06 DIAGNOSIS — G89.29 CHRONIC BILATERAL LOW BACK PAIN WITHOUT SCIATICA: ICD-10-CM

## 2025-06-06 PROCEDURE — 99999 PR PBB SHADOW E&M-EST. PATIENT-LVL III: CPT | Mod: PBBFAC,,, | Performed by: PHYSICIAN ASSISTANT

## 2025-06-06 RX ORDER — HYDROCODONE BITARTRATE AND ACETAMINOPHEN 10; 325 MG/1; MG/1
1 TABLET ORAL EVERY 12 HOURS PRN
Qty: 60 TABLET | Refills: 0 | Status: SHIPPED | OUTPATIENT
Start: 2025-06-18 | End: 2025-07-18

## 2025-06-15 ENCOUNTER — PATIENT MESSAGE (OUTPATIENT)
Dept: PAIN MEDICINE | Facility: CLINIC | Age: 80
End: 2025-06-15
Payer: MEDICARE

## 2025-07-15 ENCOUNTER — PATIENT MESSAGE (OUTPATIENT)
Dept: PAIN MEDICINE | Facility: CLINIC | Age: 80
End: 2025-07-15
Payer: MEDICARE

## 2025-07-15 DIAGNOSIS — M54.50 CHRONIC BILATERAL LOW BACK PAIN WITHOUT SCIATICA: ICD-10-CM

## 2025-07-15 DIAGNOSIS — Z02.89 PAIN MANAGEMENT CONTRACT SIGNED: ICD-10-CM

## 2025-07-15 DIAGNOSIS — G89.29 CHRONIC BILATERAL LOW BACK PAIN WITHOUT SCIATICA: ICD-10-CM

## 2025-07-15 RX ORDER — HYDROCODONE BITARTRATE AND ACETAMINOPHEN 10; 325 MG/1; MG/1
1 TABLET ORAL EVERY 12 HOURS PRN
Qty: 60 TABLET | Refills: 0 | Status: SHIPPED | OUTPATIENT
Start: 2025-07-18 | End: 2025-08-17

## 2025-07-15 NOTE — TELEPHONE ENCOUNTER
Please send Rx. Dr Marquis patient but he is out. I have reviewed the [place] [employer] website and there are no abberancies.

## 2025-08-02 ENCOUNTER — TELEPHONE (OUTPATIENT)
Dept: PHARMACY | Facility: CLINIC | Age: 80
End: 2025-08-02
Payer: MEDICARE

## 2025-08-02 NOTE — TELEPHONE ENCOUNTER
[facility]/Population Health Chart Review & Patient Outreach Details For Medication Adherence Project    Reason for Outreach Encounter: 3rd Party payor non-compliance report ([employer], [employer], [employer], etc)  2.  Patient Outreach Method: Reviewed patient chart   3.   Medication in question:    Diabetes Medications              semaglutide (OZEMPIC) 0.25 mg or 0.5 mg (2 mg/3 mL) pen injector Inject 0.5 mg into the skin every 7 days.                 ozempic  last filled  7/7 for 28 day supply      4.  Reviewed and or Updates Made To: Patient Chart  5. Outreach Outcomes and/or actions taken: Patient filled medication and is on track to be adherent  Additional Notes:

## 2025-08-15 ENCOUNTER — PATIENT MESSAGE (OUTPATIENT)
Dept: PAIN MEDICINE | Facility: CLINIC | Age: 80
End: 2025-08-15
Payer: MEDICARE

## 2025-08-15 DIAGNOSIS — Z02.89 PAIN MANAGEMENT CONTRACT SIGNED: ICD-10-CM

## 2025-08-15 DIAGNOSIS — M54.50 CHRONIC BILATERAL LOW BACK PAIN WITHOUT SCIATICA: ICD-10-CM

## 2025-08-15 DIAGNOSIS — G89.29 CHRONIC BILATERAL LOW BACK PAIN WITHOUT SCIATICA: ICD-10-CM

## 2025-08-15 RX ORDER — HYDROCODONE BITARTRATE AND ACETAMINOPHEN 10; 325 MG/1; MG/1
1 TABLET ORAL EVERY 12 HOURS PRN
Qty: 60 TABLET | Refills: 0 | Status: CANCELLED | OUTPATIENT
Start: 2025-08-15 | End: 2025-09-14

## 2025-08-16 ENCOUNTER — PATIENT MESSAGE (OUTPATIENT)
Dept: PAIN MEDICINE | Facility: CLINIC | Age: 80
End: 2025-08-16
Payer: MEDICARE